# Patient Record
Sex: FEMALE | Race: WHITE | NOT HISPANIC OR LATINO | Employment: OTHER | URBAN - METROPOLITAN AREA
[De-identification: names, ages, dates, MRNs, and addresses within clinical notes are randomized per-mention and may not be internally consistent; named-entity substitution may affect disease eponyms.]

---

## 2017-01-09 NOTE — TELEPHONE ENCOUNTER
----- Message from Maeve Franks sent at 1/9/2017  9:22 AM CST -----  Patient is requesting a medication refill.     RX name: rosuvastatin (CRESTOR) 10 MG tablet  Strength:   Quantity: 30 day supply with refills  Directions:Take 1 tablet (10 mg total) by mouth once daily. - Oral     RX name: citalopram (CELEXA) 10 MG tablet  Strength:   Quantity: 30 day supply with refills  Directions: Take 1 tablet (10 mg total) by mouth every evening. - Oral       Pharmacy name: [facility]

## 2017-01-10 RX ORDER — CITALOPRAM 10 MG/1
10 TABLET ORAL NIGHTLY
Qty: 90 TABLET | Refills: 1 | Status: SHIPPED | OUTPATIENT
Start: 2017-01-10 | End: 2017-08-02 | Stop reason: SDUPTHER

## 2017-01-10 RX ORDER — ROSUVASTATIN CALCIUM 10 MG/1
10 TABLET, COATED ORAL DAILY
Qty: 90 TABLET | Refills: 1 | Status: SHIPPED | OUTPATIENT
Start: 2017-01-10 | End: 2017-08-02 | Stop reason: SDUPTHER

## 2017-01-23 ENCOUNTER — HOSPITAL ENCOUNTER (EMERGENCY)
Facility: HOSPITAL | Age: 47
Discharge: HOME | End: 2017-01-23
Attending: EMERGENCY MEDICINE
Payer: COMMERCIAL

## 2017-01-23 VITALS
HEIGHT: 62 IN | HEART RATE: 91 BPM | OXYGEN SATURATION: 100 % | WEIGHT: 160 LBS | DIASTOLIC BLOOD PRESSURE: 84 MMHG | SYSTOLIC BLOOD PRESSURE: 132 MMHG | TEMPERATURE: 98 F | BODY MASS INDEX: 29.44 KG/M2 | RESPIRATION RATE: 18 BRPM

## 2017-01-23 DIAGNOSIS — K76.0 HEPATIC STEATOSIS: ICD-10-CM

## 2017-01-23 DIAGNOSIS — R10.11 RUQ PAIN: Primary | ICD-10-CM

## 2017-01-23 DIAGNOSIS — R16.0 HEPATOMEGALY: ICD-10-CM

## 2017-01-23 LAB
ALBUMIN SERPL BCP-MCNC: 4 G/DL
ALP SERPL-CCNC: 114 U/L
ALT SERPL W/O P-5'-P-CCNC: 29 U/L
ANION GAP SERPL CALC-SCNC: 10 MMOL/L
AST SERPL-CCNC: 32 U/L
B-HCG UR QL: NEGATIVE
BASOPHILS # BLD AUTO: 0.05 K/UL
BASOPHILS NFR BLD: 0.4 %
BILIRUB SERPL-MCNC: 0.3 MG/DL
BILIRUB UR QL STRIP: NEGATIVE
BUN SERPL-MCNC: 17 MG/DL
CALCIUM SERPL-MCNC: 9.3 MG/DL
CHLORIDE SERPL-SCNC: 101 MMOL/L
CLARITY UR REFRACT.AUTO: CLEAR
CO2 SERPL-SCNC: 26 MMOL/L
COLOR UR AUTO: YELLOW
CREAT SERPL-MCNC: 0.8 MG/DL
CTP QC/QA: YES
DIFFERENTIAL METHOD: ABNORMAL
EOSINOPHIL # BLD AUTO: 0.2 K/UL
EOSINOPHIL NFR BLD: 1.6 %
ERYTHROCYTE [DISTWIDTH] IN BLOOD BY AUTOMATED COUNT: 15.2 %
EST. GFR  (AFRICAN AMERICAN): >60 ML/MIN/1.73 M^2
EST. GFR  (NON AFRICAN AMERICAN): >60 ML/MIN/1.73 M^2
GLUCOSE SERPL-MCNC: 107 MG/DL
GLUCOSE UR QL STRIP: NEGATIVE
HCT VFR BLD AUTO: 35.6 %
HGB BLD-MCNC: 11.5 G/DL
HGB UR QL STRIP: NEGATIVE
KETONES UR QL STRIP: NEGATIVE
LEUKOCYTE ESTERASE UR QL STRIP: NEGATIVE
LIPASE SERPL-CCNC: 23 U/L
LYMPHOCYTES # BLD AUTO: 3.1 K/UL
LYMPHOCYTES NFR BLD: 27.2 %
MCH RBC QN AUTO: 25.8 PG
MCHC RBC AUTO-ENTMCNC: 32.3 %
MCV RBC AUTO: 80 FL
MONOCYTES # BLD AUTO: 0.7 K/UL
MONOCYTES NFR BLD: 5.9 %
NEUTROPHILS # BLD AUTO: 7.4 K/UL
NEUTROPHILS NFR BLD: 64.6 %
NITRITE UR QL STRIP: NEGATIVE
PH UR STRIP: 6 [PH] (ref 5–8)
PLATELET # BLD AUTO: 397 K/UL
PMV BLD AUTO: 10.1 FL
POTASSIUM SERPL-SCNC: 4.5 MMOL/L
PROT SERPL-MCNC: 8.1 G/DL
PROT UR QL STRIP: NEGATIVE
RBC # BLD AUTO: 4.45 M/UL
SODIUM SERPL-SCNC: 137 MMOL/L
SP GR UR STRIP: 1.01 (ref 1–1.03)
URN SPEC COLLECT METH UR: NORMAL
UROBILINOGEN UR STRIP-ACNC: NEGATIVE EU/DL
WBC # BLD AUTO: 11.38 K/UL

## 2017-01-23 PROCEDURE — 81003 URINALYSIS AUTO W/O SCOPE: CPT

## 2017-01-23 PROCEDURE — 25000003 PHARM REV CODE 250: Performed by: PHYSICIAN ASSISTANT

## 2017-01-23 PROCEDURE — 81025 URINE PREGNANCY TEST: CPT | Performed by: PHYSICIAN ASSISTANT

## 2017-01-23 PROCEDURE — 80053 COMPREHEN METABOLIC PANEL: CPT

## 2017-01-23 PROCEDURE — 99285 EMERGENCY DEPT VISIT HI MDM: CPT | Mod: ,,, | Performed by: PHYSICIAN ASSISTANT

## 2017-01-23 PROCEDURE — 63600175 PHARM REV CODE 636 W HCPCS: Performed by: PHYSICIAN ASSISTANT

## 2017-01-23 PROCEDURE — 85025 COMPLETE CBC W/AUTO DIFF WBC: CPT

## 2017-01-23 PROCEDURE — 96361 HYDRATE IV INFUSION ADD-ON: CPT

## 2017-01-23 PROCEDURE — 96374 THER/PROPH/DIAG INJ IV PUSH: CPT

## 2017-01-23 PROCEDURE — 83690 ASSAY OF LIPASE: CPT

## 2017-01-23 PROCEDURE — 99284 EMERGENCY DEPT VISIT MOD MDM: CPT | Mod: 25

## 2017-01-23 RX ORDER — HYOSCYAMINE SULFATE 0.12 MG/1
0.12 TABLET SUBLINGUAL
Status: COMPLETED | OUTPATIENT
Start: 2017-01-23 | End: 2017-01-23

## 2017-01-23 RX ORDER — ONDANSETRON 2 MG/ML
4 INJECTION INTRAMUSCULAR; INTRAVENOUS
Status: COMPLETED | OUTPATIENT
Start: 2017-01-23 | End: 2017-01-23

## 2017-01-23 RX ADMIN — HYOSCYAMINE SULFATE 0.12 MG: 0.12 TABLET ORAL at 10:01

## 2017-01-23 RX ADMIN — SODIUM CHLORIDE 1000 ML: 0.9 INJECTION, SOLUTION INTRAVENOUS at 10:01

## 2017-01-23 RX ADMIN — ONDANSETRON 4 MG: 2 INJECTION INTRAMUSCULAR; INTRAVENOUS at 10:01

## 2017-01-23 RX ADMIN — ALUMINUM HYDROXIDE, MAGNESIUM HYDROXIDE, AND SIMETHICONE 50 ML: 200; 200; 20 SUSPENSION ORAL at 12:01

## 2017-01-23 NOTE — ED AVS SNAPSHOT
[facility]  [address] [facility] [address]               Isela Grant   2017  9:02 AM   ED    Description:  Female : 1970   Department:  [facility]           Your Care was Coordinated By:     Provider Role From To    Darby Mcgee MD Attending Provider 17 0926 --    Alcira Gallagher PA-C Physician Assistant 17 0907 --      Reason for Visit     Abdominal Pain           Diagnoses this Visit        Comments    RUQ pain    -  Primary     Hepatomegaly         Hepatic steatosis           ED Disposition     None           To Do List           Follow-up Information     Follow up with Carlos Santiago MD In 1 week.    Specialty:  Family Medicine    Contact information:    [address] [facility] [address]  [phone]          Follow up with [facility] - Gastroenterology In 3 days.    Specialty:  Gastroenterology    Contact information:    [address] [facility] [id number] [phone]    Additional information:    [facility] - [address]th Floor        [facility]    Specialty:  Emergency Medicine    Why:  If symptoms worsen    Contact information:    [address] [facility] [id number] [phone]      [facility]     [facility] -  Assistance  Registered nurses in the [facility] provide clinical advisement, health education, appointment booking, and other advisory services.  Call for this free service at [phone].             Medications           Message regarding Medications     Verify the changes and/or additions to your medication regime listed below are the same as discussed with your clinician today.  If any of these changes or additions are incorrect, please notify your healthcare provider.        These medications were administered today        Dose Freq    sodium chloride 0.9% bolus 1,000 mL 1,000 mL ED 1 Time     Sig: Inject 1,000 mLs into the vein ED 1 Time.    Class: Normal    Route: Intravenous    Cosign for Ordering: Accepted by Darby Mcgee MD on 1/23/2017  9:27 AM    ondansetron injection 4 mg 4 mg ED 1 Time    Sig: Inject 4 mg into the vein ED 1 Time.    Class: Normal    Route: Intravenous    Cosign for Ordering: Accepted by Darby Mcgee MD on 1/23/2017  9:27 AM    hyoscyamine SL tablet 0.125 mg 0.125 mg ED 1 Time    Sig: Take 1 tablet (0.125 mg total) by mouth ED 1 Time.    Class: Normal    Route: Oral    Cosign for Ordering: Accepted by Darby Mcgee MD on 1/23/2017  9:27 AM    GI cocktail (mylanta 30 mL, lidocaine 2 % viscous 10 mL, dicyclomine 10 mL) 50 mL  ED 1 Time    Sig: Take by mouth ED 1 Time.    Class: Normal    Route: Oral    Cosign for Ordering: Accepted by Darby Mcgee MD on 1/23/2017 12:27 PM           Verify that the below list of medications is an accurate representation of the medications you are currently taking.  If none reported, the list may be blank. If incorrect, please contact your healthcare provider. Carry this list with you in case of emergency.           Current Medications     aspirin (ECOTRIN) 81 MG EC tablet Take 81 mg by mouth once daily.    citalopram (CELEXA) 10 MG tablet Take 1 tablet (10 mg total) by mouth every evening.    esomeprazole (NEXIUM) 20 MG capsule Take 1 capsule (20 mg total) by mouth before breakfast.    fexofenadine (ALLEGRA) 180 MG tablet Take 180 mg by mouth once daily.    fish oil-omega-3 fatty acids 300-1,000 mg capsule Take 2 g by mouth after lunch.     fluticasone (FLONASE) 50 mcg/actuation nasal spray 2 sprays by Each Nare route daily as needed.    gabapentin (NEURONTIN) 300 MG capsule Take 1 capsule (300 mg total) by mouth 2 (two) times daily. For pain    hydrocodone-acetaminophen 5-325mg (NORCO) 5-325 mg per tablet Take 1 tablet by mouth every 6 (six) hours as needed for Pain.    MULTIVIT-IRON-MIN-FOLIC ACID 3,500-18-0.4  "UNIT-MG-MG ORAL CHEW Take 1 tablet by mouth after lunch.     psyllium (METAMUCIL) packet Take 1 packet by mouth once daily.    rosuvastatin (CRESTOR) 10 MG tablet Take 1 tablet (10 mg total) by mouth once daily.    alprazolam (XANAX) 0.5 MG tablet Take 1 tablet (0.5 mg total) by mouth 2 (two) times daily as needed for Anxiety.    FERROUS FUMARATE (IRON ORAL) Take 1 tablet by mouth after lunch.     GI cocktail (mylanta 30 mL, lidocaine 2 % viscous 10 mL, dicyclomine 10 mL) 50 mL Take by mouth ED 1 Time.    ibuprofen (ADVIL,MOTRIN) 100 MG tablet Take 100 mg by mouth every 6 (six) hours as needed for Pain or Temperature greater than (takes 2-3).           Clinical Reference Information           Your Vitals Were     BP Pulse Temp Resp Height Weight    132/84 91 97.9 °F (36.6 °C) (Oral) 18 5' 2" (1.575 m) 72.6 kg (160 lb)    SpO2 BMI             100% 29.26 kg/m2         Allergies as of 1/23/2017        Reactions    Sulfa (Sulfonamide Antibiotics) Other (See Comments)    Stomach pain      Immunizations Administered on Date of Encounter - 1/23/2017     None      ED Micro, Lab, POCT     Start Ordered       Status Ordering Provider    01/23/17 0925 01/23/17 0924  CBC auto differential  STAT      Final result     01/23/17 0925 01/23/17 0924  Comprehensive metabolic panel  STAT      Final result     01/23/17 0925 01/23/17 0924  POCT urine pregnancy  Once      Final result     01/23/17 0924 01/23/17 0924  Lipase  STAT      Final result     01/23/17 0924 01/23/17 0924  Urinalysis  STAT      Final result       ED Imaging Orders     Start Ordered       Status Ordering Provider    01/23/17 0926 01/23/17 0925  US Abdomen Limited (Gallbladder)  1 time imaging      Final result         Discharge Instructions       Closely follow up with gastroenterology. Consider scope and other procedures for further evaluation and management. Take OTC Prolisec daily 1 hour before your first meal for the next 4 weeks. Follow-up with primary care " physician as scheduled or earlier if needed.  Please consider alcohol cessation.  Return to ED for any worsening symptoms.    Future Appointments  Date Time Provider Department Center   2/10/2017 2:30 PM Milagros Loza MD [facility] CARDIO [facility]   3/2/2017 8:00 AM Carlos Santiago MD [facility] FAM MED [facility]       Tips to Control Acid Reflux  To control acid reflux, youll need to make some basic diet and lifestyle changes. The simple steps outlined below may be all youll need to ease discomfort.  Watch what you eat    · Avoid fatty foods and spicy foods.  · Eat fewer acidic foods, such as citrus and tomato-based foods. These can increase symptoms.  · Limit drinking alcohol, caffeine, and fizzy beverages. All increase acid reflux.  · Try limiting chocolate, peppermint, and spearmint. These can worsen acid reflux in some people.  Watch when you eat  · Avoid lying down for 3 hours after eating.  · Do not snack before going to bed.  Raise your head    Raising your head and upper body by 4 to 6 inches helps limit reflux when youre lying down. Put blocks under the head of your bed frame to raise it.  Other changes  · Lose weight, if you need to  · Dont exercise near bedtime  · Avoid tight-fitting clothes  · Limit aspirin and ibuprofen  · Stop smoking   © 5788-9380 [employer]. [address] [place] [address], [place] [address]. All rights reserved. This information is not intended as a substitute for professional medical care. Always follow your healthcare professional's instructions.      Medications for Acid Reflux  Your health care provider has told you that you have acid reflux. This is a condition that causes stomach acid to wash up into your throat. For most people, acid reflux is troubling but not dangerous. However, left untreated, acid reflux sometimes damages the esophagus. Medications can help control acid reflux and limit your risk of future problems.  Medications for Acid Reflux  Your health care  provider may prescribe medication to help treat your acid reflux. Medication will be based on your symptoms and the results of any tests. Your health care provider will explain how to take your medication. You will also be told about possible side effects.  Reducing Stomach Acid  Your doctor may suggest antacids that you can buy over the counter. Antacids can give fast relief. Or you may be told to take a type of medication called H2 blockers. These are available over the counter and by prescription (for higher doses).  Blocking Stomach Acid  In more severe cases, your doctor may suggest stronger medications such as proton pump inhibitors (PPIs). These keep the stomach from making acid. They are often prescribed for long-term use.  Other Medications  If medications to reduce or block stomach acid dont work, you may be switched to another type of medication that helps the stomach empty better.  © 5154-3981 [employer]. [address] [place] [address]. All rights reserved. This information is not intended as a substitute for professional medical care. Always follow your healthcare professional's instructions.      Tests for Liver Disease     A simple blood test can show how your liver is working.   This sheet describes tests that may be done for liver problems. Your healthcare provider will tell you which tests you need.  Blood tests to monitor the liver  A small amount of blood may be taken and tested for one or more of the following:  · AFP (alpha fetoprotein). This is a protein made by the liver. A high level in the blood can be a sign of liver cancer or liver injury and regeneration in adults.  · Albumin. This is a liver function test. It measures a protein made by the liver. When a person has liver disease, the level of albumin in the blood (serum albumin) is often low.  · Alk phos (alkaline phosphatase). This is an enzyme that is mostly made in the liver and bones. Its measured with a  blood test. A high level suggests a problem with the bile ducts in the liver.  · ALT (alanine aminotransferase, formerly called SGPT). ALT is an enzyme made by the liver. When the liver is damaged, ALT leaks into the blood. If a blood test finds a high level of ALT, this can be a sign of liver problems such as inflammation, scarring, or a tumor.  · Ammonia. This is a liver function test that shows when a harmful substance is left behind in the blood after digestion. Normally the liver removes ammonia from the blood and turns it into urea. This leaves the body with urine. If a blood test shows that the ammonia level is too high, this process isnt happening as it should. This test is very inaccurate for liver function and should rarely be used.  · AST (aspartate aminotransferase, formerly called SGOT). AST is another enzyme made by the liver. It too is measured with a blood test. High levels of AST may be a sign of liver injury, especially if the ALT level is also high.  · Bilirubin. This is a liver function test. It measures the yellow substance made when the body breaks down red blood cells. Bilirubin is collected by the liver to be sent out of the body with stool. When something is wrong with the liver or bile ducts, bilirubin may build up in the body. This causes yellowing of the skin and the whites of the eyes (jaundice). Two measurements may be taken from this test: total bilirubin and direct bilirubin. A high bilirubin level may be the result of liver disease or a blockage in the bile ducts. A high indirect bilirubin can mean a condition called Gilbert syndrome. Only a small portion of people have Gilbert syndrome. Gilbert syndrome is not a sign of disease. A high indirect bilirubin can also be a sign of rapid red blood cell breakdown. This is why a bilirubin test is not a good way to test liver function.  · CBC (complete blood count). This is a test that measures all the parts of the blood. These are red  blood cells, white blood cells, and platelets. Problems with these counts can mean infection or illness. They can also be a sign of a problem with the spleen. The spleen is an organ close to the liver that can be affected by liver disease. A low platelet count is common with advanced fibrosis of the liver. It also happens when the spleen becomes enlarged and begins to absorb platelets.  · GGT (gamma-glutamyl transpeptidase). This is a liver enzyme thats often measured along with other enzymes to gauge liver problems. GGT is measured with a blood test. If alk phos and GGT are both higher than normal, it may be a sign that the bile ducts in the liver may be diseased or blocked. It also can be a sign of fatty liver or alcohol damage.  · Glucose. This is a sugar in the blood and the body's most important source of energy. A healthy liver helps the body maintain a normal glucose level. If a blood test shows that glucose is low, this may mean the liver is not working properly.  · Infectious hepatitis. This is a disease can be found with antibody and antigen tests for Hepatitis A, B, C, and E.  · INR (international normalized ratio). Prothrombin time (PT) tests the ability of the blood to form clots. The liver makes a protein that helps with clotting. Problems with clotting can be a sign of liver disease and show low levels of vitamin K.  · 5'-Nucleotidase (5NT). This is an enzyme that is made is several organs, but only released into the blood by the liver. A high or low level may be a sign of liver disease.  · Serum bile acid (SBA). This test measures the amount of bile acid in the blood. A high level may mean that bile ducts are blocked or that the liver is unable to excrete bile acid. This test is rarely done.  · Vitamins A, D, E, and K. These vitamins are stored in the liver and fat and released over time (fat-soluble). They are absorbed by the liver, with help from bile. If a blood test shows that levels of these  vitamins are low, this could mean the liver is not absorbing them properly.  · Zinc. This is a nutrient that is absorbed by the liver. If a blood test shows a low zinc level, this could mean the liver isnt absorbing zinc properly. This can worsen conditions brought on by high levels of ammonia.  Several other lab tests may be done to check for specific liver problems once liver damage is found. These include autoimmune antibodies, ceruloplasmin (Geovanni disease), an iron panel (hemochromatosis), and alpha-1-antitrypsin (alpha-1-antitrypsin deficiency).  Procedures to monitor the liver  Below are procedures that may be done to monitor the condition or function of the liver or related organs, such as the gallbladder or bile ducts.  · Liver biopsy. This is a test to look for damage in liver tissue. A needle is used to take a small amount of tissue from the liver. The tissue is sent to a lab, where it is checked for signs of inflammation, scarring, or other problems.  · CT scan. A CT scan is a series of X-rays that make a 3-D picture of the liver and gallbladder. This can show gallstones, abscesses, abnormal blood vessels, or tumors.  · ERCP (endoscopic retrograde cholangiopnacreatography). This is a test that can show if the bile ducts are blocked or narrowed. It can also take pictures of the gallbladder. During this test, a small flexible tube (endoscope) is put into the mouth. The tube is moved down the esophagus and stomach to the top of the small intestine. This is where the bile ducts are. Dye is released through the scope to make the bile ducts show up on an X-ray. The doctor may also use small tools to take tiny samples of tissue or fluid. These are sent to a lab to be studied.  · HIDA scan. This test checks gallbladder and liver function. A small amount of radioactive fluid is put into the body. This fluid will be seen on a scan as it travels through the liver to the gallbladder and into the intestine. It can  show if bile ducts are missing or blocked. It can show if the gallbladder is working properly. It can also show other problems in the bile ducts.  · MRI. This test uses magnets, radio waves, and a computer to create an image of the organs and tissues in your body. MRCP (magnetic resonance cholangiopnacreatography) is a more detailed test. It can show abnormal or narrow bile ducts, tumors, gallstones, or all three.  · Sonogram (ultrasound). This test uses sound waves and a computer to create a picture of the liver, gallbladder, and bile ducts. It can show gallstones, tumors, or fat in the liver. It is also used to check the condition of the blood vessels and look for bile collections where bile may leak out of the liver.   © 5089-6381 [employer]. [address] [place] [address]. All rights reserved. This information is not intended as a substitute for professional medical care. Always follow your healthcare professional's instructions.    Your Scheduled Appointments     Feb 10, 2017  2:30 PM CST   Established Patient Visit with Milagros Loaz MD   [facility] - Cardiology ([facility] )    [address] [place] [id number]   [phone]            Mar 02, 2017  8:00 AM CST   Established Patient Visit with Carlos Santiago MD   [place] - Family Medicine ([facility])    [address] [place] [id number] [phone]               [facility] complies with applicable Federal civil rights laws and does not discriminate on the basis of race, color, national origin, age, disability, or sex.        Language Assistance Services     ATTENTION: Language assistance services are available, free of charge. Please call [phone].      ATENCIÓN: Si habla español, tiene a yancey disposición servicios gratuitos de asistencia lingüística. Llame al [phone].     CHÚ Ý: N?u b?n nói Ti?ng Vi?t, có các d?ch v? h? tr? ngôn ng? mi?n phí dành cho b?n. G?i s?  [phone].

## 2017-01-23 NOTE — ED NOTES
Pt identifiers Isela Grant was checked and is correct  LOC: The patient is awake, alert, aware of environment with an appropriate affect. Oriented x3, speaking appropriately  APPEARANCE: Pt complaints of abd pain in right upper quadrant that has been persistent for several weeks, worse today with nausea.  Denies vomiting.  Pt is in no acute distress, pt is clean and well groomed, clothing properly fastened  SKIN: Skin warm, dry and intact, normal skin turgor, moist mucus membranes  RESPIRATORY: Airway is open and patent, respirations are spontaneous, even and unlabored, normal effort and rate  CARDIAC: Normal rate and rhythm, no peripheral edema noted,   ABDOMEN: Soft, tenderness in right upper quadrant on palpation, nondistended.   NEUROLOGIC: , patient moving all extremities spontaneously, Follows all commands appropriately  MUSCULOSKELETAL: No obvious deformities.

## 2017-01-23 NOTE — DISCHARGE INSTRUCTIONS
Closely follow up with gastroenterology. Consider scope and other procedures for further evaluation and management. Take OTC Prolisec daily 1 hour before your first meal for the next 4 weeks. Follow-up with primary care physician as scheduled or earlier if needed.  Please consider alcohol cessation.  Return to ED for any worsening symptoms.    Future Appointments  Date Time Provider Department Center   2/10/2017 2:30 PM Milagros Loza MD [facility] CARDIO [place] [facility]   3/2/2017 8:00 AM Carlos Santiago MD [facility] FAM MED [facility]       Tips to Control Acid Reflux  To control acid reflux, youll need to make some basic diet and lifestyle changes. The simple steps outlined below may be all youll need to ease discomfort.  Watch what you eat    · Avoid fatty foods and spicy foods.  · Eat fewer acidic foods, such as citrus and tomato-based foods. These can increase symptoms.  · Limit drinking alcohol, caffeine, and fizzy beverages. All increase acid reflux.  · Try limiting chocolate, peppermint, and spearmint. These can worsen acid reflux in some people.  Watch when you eat  · Avoid lying down for 3 hours after eating.  · Do not snack before going to bed.  Raise your head    Raising your head and upper body by 4 to 6 inches helps limit reflux when youre lying down. Put blocks under the head of your bed frame to raise it.  Other changes  · Lose weight, if you need to  · Dont exercise near bedtime  · Avoid tight-fitting clothes  · Limit aspirin and ibuprofen  · Stop smoking   © 2891-2966 [employer] [address] [place] [address], [place] [address]. All rights reserved. This information is not intended as a substitute for professional medical care. Always follow your healthcare professional's instructions.      Medications for Acid Reflux  Your health care provider has told you that you have acid reflux. This is a condition that causes stomach acid to wash up into your throat. For most people, acid reflux is troubling  but not dangerous. However, left untreated, acid reflux sometimes damages the esophagus. Medications can help control acid reflux and limit your risk of future problems.  Medications for Acid Reflux  Your health care provider may prescribe medication to help treat your acid reflux. Medication will be based on your symptoms and the results of any tests. Your health care provider will explain how to take your medication. You will also be told about possible side effects.  Reducing Stomach Acid  Your doctor may suggest antacids that you can buy over the counter. Antacids can give fast relief. Or you may be told to take a type of medication called H2 blockers. These are available over the counter and by prescription (for higher doses).  Blocking Stomach Acid  In more severe cases, your doctor may suggest stronger medications such as proton pump inhibitors (PPIs). These keep the stomach from making acid. They are often prescribed for long-term use.  Other Medications  If medications to reduce or block stomach acid dont work, you may be switched to another type of medication that helps the stomach empty better.  © 2589-1577 [employer]. [address] [place] [address]. All rights reserved. This information is not intended as a substitute for professional medical care. Always follow your healthcare professional's instructions.      Tests for Liver Disease     A simple blood test can show how your liver is working.   This sheet describes tests that may be done for liver problems. Your healthcare provider will tell you which tests you need.  Blood tests to monitor the liver  A small amount of blood may be taken and tested for one or more of the following:  · AFP (alpha fetoprotein). This is a protein made by the liver. A high level in the blood can be a sign of liver cancer or liver injury and regeneration in adults.  · Albumin. This is a liver function test. It measures a protein made by the  liver. When a person has liver disease, the level of albumin in the blood (serum albumin) is often low.  · Alk phos (alkaline phosphatase). This is an enzyme that is mostly made in the liver and bones. Its measured with a blood test. A high level suggests a problem with the bile ducts in the liver.  · ALT (alanine aminotransferase, formerly called SGPT). ALT is an enzyme made by the liver. When the liver is damaged, ALT leaks into the blood. If a blood test finds a high level of ALT, this can be a sign of liver problems such as inflammation, scarring, or a tumor.  · Ammonia. This is a liver function test that shows when a harmful substance is left behind in the blood after digestion. Normally the liver removes ammonia from the blood and turns it into urea. This leaves the body with urine. If a blood test shows that the ammonia level is too high, this process isnt happening as it should. This test is very inaccurate for liver function and should rarely be used.  · AST (aspartate aminotransferase, formerly called SGOT). AST is another enzyme made by the liver. It too is measured with a blood test. High levels of AST may be a sign of liver injury, especially if the ALT level is also high.  · Bilirubin. This is a liver function test. It measures the yellow substance made when the body breaks down red blood cells. Bilirubin is collected by the liver to be sent out of the body with stool. When something is wrong with the liver or bile ducts, bilirubin may build up in the body. This causes yellowing of the skin and the whites of the eyes (jaundice). Two measurements may be taken from this test: total bilirubin and direct bilirubin. A high bilirubin level may be the result of liver disease or a blockage in the bile ducts. A high indirect bilirubin can mean a condition called Gilbert syndrome. Only a small portion of people have Gilbert syndrome. Gilbert syndrome is not a sign of disease. A high indirect bilirubin can  also be a sign of rapid red blood cell breakdown. This is why a bilirubin test is not a good way to test liver function.  · CBC (complete blood count). This is a test that measures all the parts of the blood. These are red blood cells, white blood cells, and platelets. Problems with these counts can mean infection or illness. They can also be a sign of a problem with the spleen. The spleen is an organ close to the liver that can be affected by liver disease. A low platelet count is common with advanced fibrosis of the liver. It also happens when the spleen becomes enlarged and begins to absorb platelets.  · GGT (gamma-glutamyl transpeptidase). This is a liver enzyme thats often measured along with other enzymes to gauge liver problems. GGT is measured with a blood test. If alk phos and GGT are both higher than normal, it may be a sign that the bile ducts in the liver may be diseased or blocked. It also can be a sign of fatty liver or alcohol damage.  · Glucose. This is a sugar in the blood and the body's most important source of energy. A healthy liver helps the body maintain a normal glucose level. If a blood test shows that glucose is low, this may mean the liver is not working properly.  · Infectious hepatitis. This is a disease can be found with antibody and antigen tests for Hepatitis A, B, C, and E.  · INR (international normalized ratio). Prothrombin time (PT) tests the ability of the blood to form clots. The liver makes a protein that helps with clotting. Problems with clotting can be a sign of liver disease and show low levels of vitamin K.  · 5'-Nucleotidase (5NT). This is an enzyme that is made is several organs, but only released into the blood by the liver. A high or low level may be a sign of liver disease.  · Serum bile acid (SBA). This test measures the amount of bile acid in the blood. A high level may mean that bile ducts are blocked or that the liver is unable to excrete bile acid. This test is  rarely done.  · Vitamins A, D, E, and K. These vitamins are stored in the liver and fat and released over time (fat-soluble). They are absorbed by the liver, with help from bile. If a blood test shows that levels of these vitamins are low, this could mean the liver is not absorbing them properly.  · Zinc. This is a nutrient that is absorbed by the liver. If a blood test shows a low zinc level, this could mean the liver isnt absorbing zinc properly. This can worsen conditions brought on by high levels of ammonia.  Several other lab tests may be done to check for specific liver problems once liver damage is found. These include autoimmune antibodies, ceruloplasmin (Geovanni disease), an iron panel (hemochromatosis), and alpha-1-antitrypsin (alpha-1-antitrypsin deficiency).  Procedures to monitor the liver  Below are procedures that may be done to monitor the condition or function of the liver or related organs, such as the gallbladder or bile ducts.  · Liver biopsy. This is a test to look for damage in liver tissue. A needle is used to take a small amount of tissue from the liver. The tissue is sent to a lab, where it is checked for signs of inflammation, scarring, or other problems.  · CT scan. A CT scan is a series of X-rays that make a 3-D picture of the liver and gallbladder. This can show gallstones, abscesses, abnormal blood vessels, or tumors.  · ERCP (endoscopic retrograde cholangiopnacreatography). This is a test that can show if the bile ducts are blocked or narrowed. It can also take pictures of the gallbladder. During this test, a small flexible tube (endoscope) is put into the mouth. The tube is moved down the esophagus and stomach to the top of the small intestine. This is where the bile ducts are. Dye is released through the scope to make the bile ducts show up on an X-ray. The doctor may also use small tools to take tiny samples of tissue or fluid. These are sent to a lab to be studied.  · HIDA  scan. This test checks gallbladder and liver function. A small amount of radioactive fluid is put into the body. This fluid will be seen on a scan as it travels through the liver to the gallbladder and into the intestine. It can show if bile ducts are missing or blocked. It can show if the gallbladder is working properly. It can also show other problems in the bile ducts.  · MRI. This test uses magnets, radio waves, and a computer to create an image of the organs and tissues in your body. MRCP (magnetic resonance cholangiopnacreatography) is a more detailed test. It can show abnormal or narrow bile ducts, tumors, gallstones, or all three.  · Sonogram (ultrasound). This test uses sound waves and a computer to create a picture of the liver, gallbladder, and bile ducts. It can show gallstones, tumors, or fat in the liver. It is also used to check the condition of the blood vessels and look for bile collections where bile may leak out of the liver.   © 8991-3148 [employer] "[employer] [address] [place] [address], [place] [address]. All rights reserved. This information is not intended as a substitute for professional medical care. Always follow your healthcare professional's instructions.

## 2017-01-23 NOTE — ED TRIAGE NOTES
"Pt states pain in upper right upper quadrant x several weeks.  Today pain 8/10 with nausea.  Denies vomiting.  Pt states she has had several test done for "gallbladder" and all have been negative so far.  Here for eval of abd pain.   "

## 2017-02-02 ENCOUNTER — OFFICE VISIT (OUTPATIENT)
Dept: GASTROENTEROLOGY | Facility: CLINIC | Age: 47
End: 2017-02-02
Payer: COMMERCIAL

## 2017-02-02 ENCOUNTER — TELEPHONE (OUTPATIENT)
Dept: GASTROENTEROLOGY | Facility: CLINIC | Age: 47
End: 2017-02-02

## 2017-02-02 VITALS
WEIGHT: 180 LBS | DIASTOLIC BLOOD PRESSURE: 80 MMHG | HEIGHT: 62 IN | HEART RATE: 78 BPM | SYSTOLIC BLOOD PRESSURE: 130 MMHG | BODY MASS INDEX: 33.13 KG/M2

## 2017-02-02 DIAGNOSIS — R10.11 RUQ PAIN: Primary | ICD-10-CM

## 2017-02-02 DIAGNOSIS — N28.9 KIDNEY LESION: ICD-10-CM

## 2017-02-02 DIAGNOSIS — C43.4 MELANOMA OF SCALP: ICD-10-CM

## 2017-02-02 DIAGNOSIS — R12 HEARTBURN SYMPTOM: ICD-10-CM

## 2017-02-02 PROCEDURE — 99999 PR PBB SHADOW E&M-EST. PATIENT-LVL III: CPT | Mod: PBBFAC,,, | Performed by: INTERNAL MEDICINE

## 2017-02-02 PROCEDURE — 99204 OFFICE O/P NEW MOD 45 MIN: CPT | Mod: S$GLB,,, | Performed by: INTERNAL MEDICINE

## 2017-02-02 NOTE — MR AVS SNAPSHOT
[facility]  [facility] [place] [address]  Phone: [phone]  Fax: [phone]                  Isela Grant   2017 1:40 PM   Office Visit    Description:  Female : 1970   Provider:  Chuck Rubio Jr., MD   Department:  [facility]           Reason for Visit     Gastroesophageal Reflux     Alcohol Problem     Bloated           Diagnoses this Visit        Comments    RUQ pain    -  Primary     Heartburn symptom         Melanoma of scalp         Kidney lesion                To Do List           Future Appointments        Provider Department Dept Phone    2/10/2017 2:30 PM Milagros Loza MD [facility] - Cardiology [phone]    3/2/2017 8:00 AM Carlos Santiago MD [facility] Family Medicine [phone]      Goals (5 Years of Data)     None      [facility]     [facility] -  Assistance  Registered nurses in the [facility] provide clinical advisement, health education, appointment booking, and other advisory services.  Call for this free service at [phone].             Medications           Message regarding Medications     Verify the changes and/or additions to your medication regime listed below are the same as discussed with your clinician today.  If any of these changes or additions are incorrect, please notify your healthcare provider.             Verify that the below list of medications is an accurate representation of the medications you are currently taking.  If none reported, the list may be blank. If incorrect, please contact your healthcare provider. Carry this list with you in case of emergency.           Current Medications     aspirin (ECOTRIN) 81 MG EC tablet Take 81 mg by mouth once daily.    citalopram (CELEXA) 10 MG tablet Take 1 tablet (10 mg total) by mouth every evening.    esomeprazole (NEXIUM) 20 MG capsule Take 1 capsule (20 mg total) by mouth before breakfast.     "FERROUS FUMARATE (IRON ORAL) Take 1 tablet by mouth after lunch.     fexofenadine (ALLEGRA) 180 MG tablet Take 180 mg by mouth once daily.    fish oil-omega-3 fatty acids 300-1,000 mg capsule Take 2 g by mouth after lunch.     fluticasone (FLONASE) 50 mcg/actuation nasal spray 2 sprays by Each Nare route daily as needed.    gabapentin (NEURONTIN) 300 MG capsule Take 1 capsule (300 mg total) by mouth 2 (two) times daily. For pain    hydrocodone-acetaminophen 5-325mg (NORCO) 5-325 mg per tablet Take 1 tablet by mouth every 6 (six) hours as needed for Pain.    ibuprofen (ADVIL,MOTRIN) 100 MG tablet Take 100 mg by mouth every 6 (six) hours as needed for Pain or Temperature greater than (takes 2-3).    MULTIVIT-IRON-MIN-FOLIC ACID 3,500-18-0.4 UNIT-MG-MG ORAL CHEW Take 1 tablet by mouth after lunch.     psyllium (METAMUCIL) packet Take 1 packet by mouth once daily.    rosuvastatin (CRESTOR) 10 MG tablet Take 1 tablet (10 mg total) by mouth once daily.    alprazolam (XANAX) 0.5 MG tablet Take 1 tablet (0.5 mg total) by mouth 2 (two) times daily as needed for Anxiety.           Clinical Reference Information           Your Vitals Were     BP Pulse Height Weight BMI    130/80 (BP Location: Right arm, Patient Position: Sitting) 78 5' 2" (1.575 m) 81.6 kg (180 lb) 32.92 kg/m2      Blood Pressure          Most Recent Value    BP  130/80      Allergies as of 2/2/2017     Sulfa (Sulfonamide Antibiotics)      Immunizations Administered on Date of Encounter - 2/2/2017     None      Orders Placed During Today's Visit     Future Labs/Procedures Expected by Expires    CT Abdomen Pelvis W Wo Contrast  2/2/2017 2/2/2018      Instructions      Computed Tomography (CT)  Computed tomography (CT) is a test that combines X-rays and computer scans. The result is a detailed picture that can show problems with soft tissues, such as the lining of your sinuses, organs, such as your kidneys or lungs, blood vessels, and bones.     During the " "test, relax and remain as still as you can.   Before your test  · Be sure to tell your health care provider if you have ever had a reaction to contrast material ("X-ray dye"). If you have had a reaction, you may need to take medicine 12 hours before your scan, so be sure to tell your doctor ahead of time.   · Be sure to mention the medicines you take. Ask if it's OK to take them before the test.   · Follow any directions youre given for not eating or drinking before the procedure. Your provider will give you instructions if required. You may be required to drink contrast by mouth before arriving for the study depending on the type of exam you are having. Your provider or the imaging site will provide this for you.  · The length of the procedure may vary, depending on your condition and your provider's practices.  · Arrive on time to check in.  · When you arrive, you may be asked to change into a hospital gown. Remove all metal near the part of your body that will be scanned, including jewelry, eyeglasses, and dentures. Women may need to remove any bra that has metal underwire.   Tell the technologist   Be sure to tell the technologist if:  · You have allergies or kidney problems  · You take diabetes medicine  · You are pregnant or think you may be  · You ate or drank anything before the test   During your test  · You may be given contrast through an intravenous (IV) line or by mouth.  · You will lie on a table. The table slides into the CT scanner.  · The technologist will ask you to hold your breath for a few seconds during your scan.  After your test  · You can go back to your normal diet and activities right away. Any contrast will pass naturally through your body within a day.  · Before leaving, you may need to wait briefly while your images are being reviewed. Your doctor will discuss the test results with you during a follow-up appointment or over the phone.  · Your next appointment is:__________________  © " 3927-3634 [employer]. [address] [place] [address], [place] [address]. All rights reserved. This information is not intended as a substitute for professional medical care. Always follow your healthcare professional's instructions.             Language Assistance Services     ATTENTION: Language assistance services are available, free of charge. Please call [phone].      ATENCIÓN: Si habla español, tiene a yancey disposición servicios gratuitos de asistencia lingüística. Llame al [phone].     CHÚ Ý: N?u b?n nói Ti?ng Vi?t, có các d?ch v? h? tr? ngôn ng? mi?n phí dành cho b?n. G?i s? [phone].         [facility] complies with applicable Federal civil rights laws and does not discriminate on the basis of race, color, national origin, age, disability, or sex.

## 2017-02-02 NOTE — TELEPHONE ENCOUNTER
Received patients records from [facility] on 02/02/2017. Records was scan in under media.

## 2017-02-02 NOTE — PATIENT INSTRUCTIONS
"  Computed Tomography (CT)  Computed tomography (CT) is a test that combines X-rays and computer scans. The result is a detailed picture that can show problems with soft tissues, such as the lining of your sinuses, organs, such as your kidneys or lungs, blood vessels, and bones.     During the test, relax and remain as still as you can.   Before your test  · Be sure to tell your health care provider if you have ever had a reaction to contrast material ("X-ray dye"). If you have had a reaction, you may need to take medicine 12 hours before your scan, so be sure to tell your doctor ahead of time.   · Be sure to mention the medicines you take. Ask if it's OK to take them before the test.   · Follow any directions youre given for not eating or drinking before the procedure. Your provider will give you instructions if required. You may be required to drink contrast by mouth before arriving for the study depending on the type of exam you are having. Your provider or the imaging site will provide this for you.  · The length of the procedure may vary, depending on your condition and your provider's practices.  · Arrive on time to check in.  · When you arrive, you may be asked to change into a hospital gown. Remove all metal near the part of your body that will be scanned, including jewelry, eyeglasses, and dentures. Women may need to remove any bra that has metal underwire.   Tell the technologist   Be sure to tell the technologist if:  · You have allergies or kidney problems  · You take diabetes medicine  · You are pregnant or think you may be  · You ate or drank anything before the test   During your test  · You may be given contrast through an intravenous (IV) line or by mouth.  · You will lie on a table. The table slides into the CT scanner.  · The technologist will ask you to hold your breath for a few seconds during your scan.  After your test  · You can go back to your normal diet and activities right away. Any " contrast will pass naturally through your body within a day.  · Before leaving, you may need to wait briefly while your images are being reviewed. Your doctor will discuss the test results with you during a follow-up appointment or over the phone.  · Your next appointment is:__________________  © 7911-5828 [employer]. [address] [place] [address]. All rights reserved. This information is not intended as a substitute for professional medical care. Always follow your healthcare professional's instructions.

## 2017-02-02 NOTE — PROGRESS NOTES
Subjective:      Patient ID: sIela Grant is a 46 y.o. female.    Chief Complaint: Gastroesophageal Reflux; Alcohol Problem; and Bloated    HPI:   Patient 46-year-old female presenting for GI follow-up.  She gives a history of chronic sporadic right upper quadrant pain for several years.  Prior evaluations by her history included upper endoscopy, colonoscopy, HIDA scan, ultrasound.  Ultrasound demonstrated fatty liver and hepatomegaly.  Prior ultrasound demonstrated a 7 mm renal lesion.  Other studies are not available at present.  We'll request the studies from [facility].  Patient is been seen by cardiology and cleared with noninvasive studies.  Patient indicates the pain is more persistent now and has periodic exacerbations severe enough to prompt an emergency room visit January 23.  GI systems review includes some heartburn and dyspepsia for which she takes a PPI.  Some abdominal bloating.  Past medical history includes melanoma of her scalp in 2015.  Prior appendectomy.  She has been treated with iron for mild anemia.  Ferritin level in the past was normal.  Denies NSAID use.  Family history negative for GI neoplasm.  Review of patient's allergies indicates:   Allergen Reactions    Sulfa (sulfonamide antibiotics) Other (See Comments)     Stomach pain     Past Medical History   Diagnosis Date    Allergy     Anxiety     Depression     Hyperlipidemia     Melanoma 1/26/2015     scalp excised by Dr Aldridge    Melanoma of scalp 1/12/2015    Open wound of scalp, complicated 2/6/2015     Past Surgical History   Procedure Laterality Date    Skin surgery       removal of cancer    Appendectomy      Tonsillectomy      Eye surgery      Wle scalp melanoma  1/26/2015    Scalp reconstruction  2/9/2015     yin-hull flaps     Family History   Problem Relation Age of Onset    Hyperlipidemia Mother     Heart attack Mother     Hyperlipidemia Father     Cancer Father     Heart disease Father     Cancer  "Maternal Grandmother      BCC    Breast cancer Paternal Grandmother 50    Heart attack Paternal Grandmother     Heart disease Paternal Grandmother     Melanoma Neg Hx     Colon cancer Neg Hx     Ovarian cancer Neg Hx      Social History     Social History    Marital status:      Spouse name: N/A    Number of children: N/A    Years of education: N/A     Occupational History    Not on file.     Social History Main Topics    Smoking status: Never Smoker    Smokeless tobacco: Not on file    Alcohol use Yes      Comment: occasionally    Drug use: No    Sexual activity: Yes     Partners: Male     Birth control/ protection: None     Other Topics Concern    Are You Pregnant Or Think You May Be? No     Social History Narrative    Works at a day care       Review of Systems:  Constitutional: Negative for appetite change.   HENT: Negative for trouble swallowing.   Eyes: Negative for photophobia.   Respiratory: Negative for cough and shortness of breath.   Cardiovascular: Negative for palpitations.   Gastrointestinal: See HPI for details.  Genitourinary: Negative for frequency and hematuria.   Skin: Negative for rash.   Neurological: Negative for weakness and headaches.   Hematological: Negative.   Psychiatric/Behavioral: Negative for suicidal ideas and behavioral problems.  Some anxiety    Objective:     Visit Vitals    /80 (BP Location: Right arm, Patient Position: Sitting)    Pulse 78    Ht 5' 2" (1.575 m)    Wt 81.6 kg (180 lb)    BMI 32.92 kg/m2       Physical Exam:  Eyes: Pupils are equal, round, and reactive to light.   Neck: Supple. No mass  Cardiovascular: Regular rhythm . No murmur   Pulmonary/Chest: Lungs clear   Abdominal: Soft. No mass palpated. Nontender, no guarding. Positive bowel sounds   Musculoskeletal: No deformity. No edema.   Psychiatric: Alert and oriented    Assessment:     1. RUQ pain    2. Heartburn symptom    3. [id number] melanoma of scalp    4. Kidney lesion  "     Plan:     Isela was seen today for gastroesophageal reflux, alcohol problem and bloated.    Diagnoses and all orders for this visit:    RUQ pain  -     CT Abdomen Pelvis W Wo Contrast; Future    Heartburn symptom  -     CT Abdomen Pelvis W Wo Contrast; Future    [id number] melanoma of scalp  -     CT Abdomen Pelvis W Wo Contrast; Future    Kidney lesion  -     CT Abdomen Pelvis W Wo Contrast; Future      Plan:  CT abdomen and pelvis with and without IV contrast  Request records from [facility] including endoscopies, HIDA scan, ultrasound, any  abdominal imaging

## 2017-02-08 ENCOUNTER — HOSPITAL ENCOUNTER (OUTPATIENT)
Dept: RADIOLOGY | Facility: HOSPITAL | Age: 47
Discharge: HOME | End: 2017-02-08
Attending: INTERNAL MEDICINE
Payer: COMMERCIAL

## 2017-02-08 DIAGNOSIS — C43.4 MELANOMA OF SCALP: ICD-10-CM

## 2017-02-08 DIAGNOSIS — R10.11 RUQ PAIN: ICD-10-CM

## 2017-02-08 DIAGNOSIS — R12 HEARTBURN SYMPTOM: ICD-10-CM

## 2017-02-08 DIAGNOSIS — N28.9 KIDNEY LESION: ICD-10-CM

## 2017-02-08 PROCEDURE — 25500020 PHARM REV CODE 255: Mod: PO | Performed by: INTERNAL MEDICINE

## 2017-02-08 PROCEDURE — 74178 CT ABD&PLV WO CNTR FLWD CNTR: CPT | Mod: TC,PO

## 2017-02-08 RX ADMIN — IOHEXOL 30 ML: 300 INJECTION, SOLUTION INTRAVENOUS at 08:02

## 2017-02-08 RX ADMIN — IOHEXOL 90 ML: 350 INJECTION, SOLUTION INTRAVENOUS at 09:02

## 2017-02-10 ENCOUNTER — OFFICE VISIT (OUTPATIENT)
Dept: CARDIOLOGY | Facility: CLINIC | Age: 47
End: 2017-02-10
Payer: COMMERCIAL

## 2017-02-10 VITALS
BODY MASS INDEX: 33.07 KG/M2 | SYSTOLIC BLOOD PRESSURE: 116 MMHG | DIASTOLIC BLOOD PRESSURE: 67 MMHG | HEIGHT: 62 IN | WEIGHT: 179.69 LBS | HEART RATE: 85 BPM

## 2017-02-10 DIAGNOSIS — R10.11 RUQ PAIN: ICD-10-CM

## 2017-02-10 DIAGNOSIS — Z82.79 FAMILY HISTORY OF CONGENITAL HEART DISEASE: Primary | ICD-10-CM

## 2017-02-10 DIAGNOSIS — F41.9 ANXIETY: ICD-10-CM

## 2017-02-10 DIAGNOSIS — R07.9 CHEST PAIN, UNSPECIFIED TYPE: ICD-10-CM

## 2017-02-10 PROCEDURE — 99999 PR PBB SHADOW E&M-EST. PATIENT-LVL III: CPT | Mod: PBBFAC,,, | Performed by: INTERNAL MEDICINE

## 2017-02-10 PROCEDURE — 99215 OFFICE O/P EST HI 40 MIN: CPT | Mod: S$GLB,,, | Performed by: INTERNAL MEDICINE

## 2017-02-10 NOTE — MR AVS SNAPSHOT
[facility] - [facility]  [address]  [facility] [address]  Phone: [phone]                  Isela Grant   2/10/2017 2:30 PM   Office Visit    Description:  Female : 1970   Provider:  Milagros Loza MD   Department:  [facility]           Reason for Visit     Family history of congenital heart disease                To Do List           Future Appointments        Provider Department Dept Phone    3/2/2017 8:00 AM Carlos Santiago MD [facility] [phone]      Goals (5 Years of Data)     None      [facility] On [facility]     [facility] On Call Nurse Care Line -  Assistance  Registered nurses in the [facility] On Call Center provide clinical advisement, health education, appointment booking, and other advisory services.  Call for this free service at [phone].             Medications           Message regarding Medications     Verify the changes and/or additions to your medication regime listed below are the same as discussed with your clinician today.  If any of these changes or additions are incorrect, please notify your healthcare provider.        STOP taking these medications     psyllium (METAMUCIL) packet Take 1 packet by mouth once daily.           Verify that the below list of medications is an accurate representation of the medications you are currently taking.  If none reported, the list may be blank. If incorrect, please contact your healthcare provider. Carry this list with you in case of emergency.           Current Medications     alprazolam (XANAX) 0.5 MG tablet Take 1 tablet (0.5 mg total) by mouth 2 (two) times daily as needed for Anxiety.    aspirin (ECOTRIN) 81 MG EC tablet Take 81 mg by mouth once daily.    citalopram (CELEXA) 10 MG tablet Take 1 tablet (10 mg total) by mouth every evening.    esomeprazole (NEXIUM) 20 MG capsule Take 1 capsule (20 mg total) by mouth before breakfast.    FERROUS FUMARATE (IRON ORAL) Take 1 tablet by mouth after  "lunch.     fexofenadine (ALLEGRA) 180 MG tablet Take 180 mg by mouth as needed.     fish oil-omega-3 fatty acids 300-1,000 mg capsule Take 2 g by mouth after lunch.     fluticasone (FLONASE) 50 mcg/actuation nasal spray 2 sprays by Each Nare route daily as needed.    gabapentin (NEURONTIN) 300 MG capsule Take 1 capsule (300 mg total) by mouth 2 (two) times daily. For pain    hydrocodone-acetaminophen 5-325mg (NORCO) 5-325 mg per tablet Take 1 tablet by mouth every 6 (six) hours as needed for Pain.    ibuprofen (ADVIL,MOTRIN) 100 MG tablet Take 100 mg by mouth every 6 (six) hours as needed for Pain or Temperature greater than (takes 2-3).    MULTIVIT-IRON-MIN-FOLIC ACID 3,500-18-0.4 UNIT-MG-MG ORAL CHEW Take 1 tablet by mouth after lunch.     rosuvastatin (CRESTOR) 10 MG tablet Take 1 tablet (10 mg total) by mouth once daily.           Clinical Reference Information           Your Vitals Were     BP Pulse Height Weight BMI    116/67 (BP Location: Left arm, Patient Position: Sitting, BP Method: Automatic) 85 5' 1.5" (1.562 m) 81.5 kg (179 lb 10.8 oz) 33.4 kg/m2      Blood Pressure          Most Recent Value    Right Arm BP - Sitting  116/66    Left Arm BP - Sitting  116/67    BP  116/67      Allergies as of 2/10/2017     Sulfa (Sulfonamide Antibiotics)      Immunizations Administered on Date of Encounter - 2/10/2017     None      Language Assistance Services     ATTENTION: Language assistance services are available, free of charge. Please call [phone].      ATENCIÓN: Si velvet littlejohn, tiene a yancey disposición servicios gratuitos de asistencia lingüística. Llame al [phone].     DAMION Ý: N?u b?n nói Ti?ng Vi?t, có các d?ch v? h? tr? ngôn ng? mi?n phí dành cho b?n. G?i s? [phone].         [facility] complies with applicable Federal civil rights laws and does not discriminate on the basis of race, color, national origin, age, disability, or sex.        "

## 2017-02-13 NOTE — PROGRESS NOTES
"Subjective:   Patient ID:  Isela Grant is a 46 y.o. female who presents for follow-up of Family history of congenital heart disease (3 month f/u )    HPI:   Echo 2017.   1 - Normal left ventricular systolic function (EF 60-65%).     2 - Normal left ventricular diastolic function.     3 - Normal right ventricular systolic function .     4 - The estimated PA systolic pressure is 31 mmHg.     5 - Trivial mitral regurgitation.     6 - Mild tricuspid regurgitation.   Bubble study negative for ischemia.   I reviewed the study as well. There is no congenital abnormality.    HPI:  Patient continues to c/o RUQ pain that is somewhat constant. Her chest pain has improved and she bikes and is very active.     Patient Active Problem List   Diagnosis    [id number] melanoma of scalp    RUQ pain     Visit Vitals    /67 (BP Location: Left arm, Patient Position: Sitting, BP Method: Automatic)    Pulse 85    Ht 5' 1.5" (1.562 m)    Wt 81.5 kg (179 lb 10.8 oz)    BMI 33.4 kg/m2     Body mass index is 33.4 kg/(m^2).  CrCl cannot be calculated (Patient has no serum creatinine result on file.).    Lab Results   Component Value Date     01/23/2017    K 4.5 01/23/2017     01/23/2017    CO2 26 01/23/2017    BUN 17 01/23/2017    CREATININE 0.8 01/23/2017     01/23/2017    HGBA1C 5.5 06/26/2012    AST 32 01/23/2017    ALT 29 01/23/2017    ALBUMIN 4.0 01/23/2017    PROT 8.1 01/23/2017    BILITOT 0.3 01/23/2017    WBC 11.38 01/23/2017    HGB 11.5 (L) 01/23/2017    HCT 35.6 (L) 01/23/2017    MCV 80 (L) 01/23/2017     (H) 01/23/2017    TSH 1.290 01/18/2016    CHOL 174 01/18/2016    HDL 66 01/18/2016    LDLCALC 75.2 01/18/2016    TRIG 164 (H) 01/18/2016       Current Outpatient Prescriptions   Medication Sig    alprazolam (XANAX) 0.5 MG tablet Take 1 tablet (0.5 mg total) by mouth 2 (two) times daily as needed for Anxiety.    aspirin (ECOTRIN) 81 MG EC tablet Take 81 mg by mouth once daily.    citalopram " (CELEXA) 10 MG tablet Take 1 tablet (10 mg total) by mouth every evening.    esomeprazole (NEXIUM) 20 MG capsule Take 1 capsule (20 mg total) by mouth before breakfast.    FERROUS FUMARATE (IRON ORAL) Take 1 tablet by mouth after lunch.     fexofenadine (ALLEGRA) 180 MG tablet Take 180 mg by mouth as needed.     fish oil-omega-3 fatty acids 300-1,000 mg capsule Take 2 g by mouth after lunch.     fluticasone (FLONASE) 50 mcg/actuation nasal spray 2 sprays by Each Nare route daily as needed.    gabapentin (NEURONTIN) 300 MG capsule Take 1 capsule (300 mg total) by mouth 2 (two) times daily. For pain    hydrocodone-acetaminophen 5-325mg (NORCO) 5-325 mg per tablet Take 1 tablet by mouth every 6 (six) hours as needed for Pain.    ibuprofen (ADVIL,MOTRIN) 100 MG tablet Take 100 mg by mouth every 6 (six) hours as needed for Pain or Temperature greater than (takes 2-3).    MULTIVIT-IRON-MIN-FOLIC ACID 3,500-18-0.4 UNIT-MG-MG ORAL CHEW Take 1 tablet by mouth after lunch.     rosuvastatin (CRESTOR) 10 MG tablet Take 1 tablet (10 mg total) by mouth once daily.     No current facility-administered medications for this visit.        Review of Systems   Constitution: Negative. Negative for chills, decreased appetite, weakness, malaise/fatigue, night sweats, weight gain and weight loss.   HENT: Negative.    Eyes: Negative.  Negative for blurred vision, double vision, visual disturbance and visual halos.   Cardiovascular: Positive for chest pain. Negative for claudication, cyanosis, dyspnea on exertion, irregular heartbeat, leg swelling, near-syncope, orthopnea, palpitations, paroxysmal nocturnal dyspnea and syncope.   Respiratory: Negative.  Negative for cough, hemoptysis, snoring, sputum production and wheezing.    Endocrine: Negative.  Negative for cold intolerance, heat intolerance, polydipsia and polyphagia.   Hematologic/Lymphatic: Negative for adenopathy and bleeding problem. Does not bruise/bleed easily.   Skin:  Negative.  Negative for flushing, itching, poor wound healing and rash.   Musculoskeletal: Negative.  Negative for arthritis, back pain, falls, gout, joint pain, joint swelling, muscle cramps, muscle weakness, myalgias, neck pain and stiffness.   Gastrointestinal: Negative for bloating, abdominal pain, anorexia, diarrhea, dysphagia, excessive appetite, flatus, hematemesis, jaundice, melena and nausea.   Genitourinary: Negative for hesitancy and incomplete emptying.   Neurological: Negative for aphonia, brief paralysis, difficulty with concentration, disturbances in coordination, excessive daytime sleepiness, dizziness, focal weakness, light-headedness and loss of balance.   Psychiatric/Behavioral: Negative for altered mental status, depression, hallucinations, hypervigilance, memory loss, substance abuse and suicidal ideas. The patient does not have insomnia and is not nervous/anxious.         Anxiety         Objective:   Physical Exam   Constitutional: She is oriented to person, place, and time. She appears well-developed and well-nourished. No distress.   Patient appears anxious   HENT:   Head: Normocephalic and atraumatic.   Nose: Nose normal.   Mouth/Throat: Oropharynx is clear and moist. No oropharyngeal exudate.   Eyes: Conjunctivae and EOM are normal. Pupils are equal, round, and reactive to light. Right eye exhibits no discharge. Left eye exhibits no discharge. No scleral icterus.   Neck: Normal range of motion. Neck supple. No JVD present. No tracheal deviation present. No thyromegaly present.   Cardiovascular: Normal rate, regular rhythm, normal heart sounds and intact distal pulses.  Exam reveals no gallop and no friction rub.    No murmur heard.  Pulmonary/Chest: Effort normal and breath sounds normal. No stridor. No respiratory distress. She has no wheezes. She has no rales. She exhibits no tenderness.   Abdominal: Soft. Bowel sounds are normal. She exhibits no distension and no mass. There is no  tenderness. There is no rebound and no guarding.   Musculoskeletal: Normal range of motion. She exhibits no edema or tenderness.   Lymphadenopathy:     She has no cervical adenopathy.   Neurological: She is alert and oriented to person, place, and time. She has normal reflexes. No cranial nerve deficit. She exhibits normal muscle tone. Coordination normal.   Skin: Skin is warm. No rash noted. She is not diaphoretic. No erythema. No pallor.   Psychiatric: She has a normal mood and affect. Her behavior is normal. Judgment and thought content normal.       Assessment:     1. Family history of congenital heart disease    2. Chest pain, unspecified type    3. Anxiety    4. RUQ pain        Plan:   Isela was seen today for family history of congenital heart disease.    Diagnoses and all orders for this visit:    Family history of congenital heart disease    Chest pain, unspecified type    Anxiety    RUQ pain      Normal LV function and structure. Do  Not suspect CAD based on symptoms.   RUQ pain to be evaluated by PCP/  Counseled on importance of heart healthy diet low in saturated and trans fat and salt as well gradually starting a regular aerobic exercise regimen with goal of 30min 5x/week. Recommend BP diary. Call if systolic BP > 140 mmHg on checking repeatedly      RTC PRN.

## 2017-02-14 ENCOUNTER — TELEPHONE (OUTPATIENT)
Dept: GASTROENTEROLOGY | Facility: CLINIC | Age: 47
End: 2017-02-14

## 2017-02-14 NOTE — TELEPHONE ENCOUNTER
----- Message from Nickie Parada sent at 2/14/2017  3:02 PM CST -----  Contact: self/[phone]  She is calling for the results of the ctscan.

## 2017-02-16 ENCOUNTER — TELEPHONE (OUTPATIENT)
Dept: GASTROENTEROLOGY | Facility: CLINIC | Age: 47
End: 2017-02-16

## 2017-02-16 NOTE — TELEPHONE ENCOUNTER
----- Message from Chuck Rubio Jr., MD sent at 2/16/2017  2:27 PM CST -----  Contact: self/[phone]  Patient has continued abdominal pain and some heartburn despite Nexium  Please schedule EGD.    She is requesting [facility] the Thursday after Kevin Susanne      I discussed her CT findings with her.      ----- Message -----     From: Sharon Barrios MA     Sent: 2/14/2017   3:39 PM       To: Chuck Rubio Jr., MD    Have you seen the CT results for this pt ?  ----- Message -----     From: Nickie Parada     Sent: 2/14/2017   3:02 PM       To: [facility] Staff ([place])    She is calling for the results of the ctscan.

## 2017-02-22 DIAGNOSIS — C43.4 MALIGNANT MELANOMA OF SCALP: Primary | ICD-10-CM

## 2017-02-22 DIAGNOSIS — R59.9 ENLARGED LYMPH NODE: ICD-10-CM

## 2017-02-23 ENCOUNTER — TELEPHONE (OUTPATIENT)
Dept: GASTROENTEROLOGY | Facility: CLINIC | Age: 47
End: 2017-02-23

## 2017-02-23 NOTE — TELEPHONE ENCOUNTER
Pt was scheduled for PET scan on Friday 3/3/17 at [facility] for 12 noon. Pt is to fast 6 hrs prior to study. Pt may have non flavored water. Pt was notified.

## 2017-02-23 NOTE — TELEPHONE ENCOUNTER
----- Message from Chuck Rubio Jr., MD sent at 2/22/2017  4:52 PM CST -----  I have ordered a PET scan on this patient  The reason is a prior history of melanoma of the scalp   and an incidental prominent lymph node in the abdomen by CT.    I notified patient to expect a call.  I believe PET scans are scheduled at [facility]

## 2017-03-03 ENCOUNTER — HOSPITAL ENCOUNTER (OUTPATIENT)
Dept: RADIOLOGY | Facility: HOSPITAL | Age: 47
Discharge: HOME | End: 2017-03-03
Attending: INTERNAL MEDICINE
Payer: COMMERCIAL

## 2017-03-03 DIAGNOSIS — C43.4 MALIGNANT MELANOMA OF SCALP: ICD-10-CM

## 2017-03-03 DIAGNOSIS — R59.9 ENLARGED LYMPH NODE: ICD-10-CM

## 2017-03-03 PROCEDURE — 78816 PET IMAGE W/CT FULL BODY: CPT | Mod: TC

## 2017-03-03 PROCEDURE — 78816 PET IMAGE W/CT FULL BODY: CPT | Mod: 26,PI,, | Performed by: RADIOLOGY

## 2017-03-09 ENCOUNTER — TELEPHONE (OUTPATIENT)
Dept: GASTROENTEROLOGY | Facility: CLINIC | Age: 47
End: 2017-03-09

## 2017-03-20 ENCOUNTER — OFFICE VISIT (OUTPATIENT)
Dept: FAMILY MEDICINE | Facility: CLINIC | Age: 47
End: 2017-03-20
Payer: COMMERCIAL

## 2017-03-20 VITALS
HEART RATE: 85 BPM | WEIGHT: 182.13 LBS | OXYGEN SATURATION: 97 % | DIASTOLIC BLOOD PRESSURE: 84 MMHG | TEMPERATURE: 98 F | SYSTOLIC BLOOD PRESSURE: 126 MMHG | BODY MASS INDEX: 33.51 KG/M2 | HEIGHT: 62 IN

## 2017-03-20 DIAGNOSIS — R05.9 COUGH: ICD-10-CM

## 2017-03-20 DIAGNOSIS — J30.89 NON-SEASONAL ALLERGIC RHINITIS DUE TO OTHER ALLERGIC TRIGGER: Primary | ICD-10-CM

## 2017-03-20 PROCEDURE — 1160F RVW MEDS BY RX/DR IN RCRD: CPT | Mod: S$GLB,,, | Performed by: NURSE PRACTITIONER

## 2017-03-20 PROCEDURE — 96372 THER/PROPH/DIAG INJ SC/IM: CPT | Mod: S$GLB,,, | Performed by: NURSE PRACTITIONER

## 2017-03-20 PROCEDURE — 99213 OFFICE O/P EST LOW 20 MIN: CPT | Mod: 25,S$GLB,, | Performed by: NURSE PRACTITIONER

## 2017-03-20 RX ORDER — CODEINE PHOSPHATE AND GUAIFENESIN 10; 100 MG/5ML; MG/5ML
5 SOLUTION ORAL NIGHTLY PRN
Qty: 180 ML | Refills: 0 | Status: SHIPPED | OUTPATIENT
Start: 2017-03-20 | End: 2017-03-30

## 2017-03-20 RX ORDER — TRIAMCINOLONE ACETONIDE 40 MG/ML
80 INJECTION, SUSPENSION INTRA-ARTICULAR; INTRAMUSCULAR
Status: COMPLETED | OUTPATIENT
Start: 2017-03-20 | End: 2017-03-20

## 2017-03-20 RX ADMIN — TRIAMCINOLONE ACETONIDE 80 MG: 40 INJECTION, SUSPENSION INTRA-ARTICULAR; INTRAMUSCULAR at 12:03

## 2017-03-20 NOTE — MR AVS SNAPSHOT
[facility]  [address] [address]  Phone: [phone]  Fax: [phone]                  Isela Grant   3/20/2017 11:40 AM   Office Visit    Description:  Female : 1970   Provider:  Violeta Pulido NP   Department:  [facility]           Reason for Visit     Sore Throat     Cough           Diagnoses this Visit        Comments    Non-seasonal allergic rhinitis due to other allergic trigger    -  Primary     Cough                To Do List           Goals (5 Years of Data)     None       These Medications        Disp Refills Start End    guaifenesin-codeine 100-10 mg/5 ml (CHERATUSSIN AC)  mg/5 mL syrup 180 mL 0 3/20/2017 3/30/2017    Take 5 mLs by mouth nightly as needed for Cough. - Oral    Pharmacy: [id number] [address] #: [phone]         [facility] On Call     [facility] On Call Nurse Care Line -  Assistance  Registered nurses in the [facility] On Call Center provide clinical advisement, health education, appointment booking, and other advisory services.  Call for this free service at [phone].             Medications           Message regarding Medications     Verify the changes and/or additions to your medication regime listed below are the same as discussed with your clinician today.  If any of these changes or additions are incorrect, please notify your healthcare provider.        START taking these NEW medications        Refills    guaifenesin-codeine 100-10 mg/5 ml (CHERATUSSIN AC)  mg/5 mL syrup 0    Sig: Take 5 mLs by mouth nightly as needed for Cough.    Class: Normal    Route: Oral      These medications were administered today        Dose Freq    triamcinolone acetonide injection 80 mg 80 mg Clinic/HOD 1 time    Sig: Inject 2 mLs (80 mg total) into the muscle one time.    Class: Normal    Route: Intramuscular      STOP taking these medications     FERROUS FUMARATE (IRON  "ORAL) Take 1 tablet by mouth after lunch.            Verify that the below list of medications is an accurate representation of the medications you are currently taking.  If none reported, the list may be blank. If incorrect, please contact your healthcare provider. Carry this list with you in case of emergency.           Current Medications     alprazolam (XANAX) 0.5 MG tablet Take 1 tablet (0.5 mg total) by mouth 2 (two) times daily as needed for Anxiety.    aspirin (ECOTRIN) 81 MG EC tablet Take 81 mg by mouth once daily.    citalopram (CELEXA) 10 MG tablet Take 1 tablet (10 mg total) by mouth every evening.    esomeprazole (NEXIUM) 20 MG capsule Take 1 capsule (20 mg total) by mouth before breakfast.    fexofenadine (ALLEGRA) 180 MG tablet Take 180 mg by mouth as needed.     fish oil-omega-3 fatty acids 300-1,000 mg capsule Take 2 g by mouth after lunch.     fluticasone (FLONASE) 50 mcg/actuation nasal spray 2 sprays by Each Nare route daily as needed.    gabapentin (NEURONTIN) 300 MG capsule Take 1 capsule (300 mg total) by mouth 2 (two) times daily. For pain    hydrocodone-acetaminophen 5-325mg (NORCO) 5-325 mg per tablet Take 1 tablet by mouth every 6 (six) hours as needed for Pain.    ibuprofen (ADVIL,MOTRIN) 100 MG tablet Take 100 mg by mouth every 6 (six) hours as needed for Pain or Temperature greater than (takes 2-3).    MULTIVIT-IRON-MIN-FOLIC ACID 3,500-18-0.4 UNIT-MG-MG ORAL CHEW Take 1 tablet by mouth after lunch.     rosuvastatin (CRESTOR) 10 MG tablet Take 1 tablet (10 mg total) by mouth once daily.    guaifenesin-codeine 100-10 mg/5 ml (CHERATUSSIN AC)  mg/5 mL syrup Take 5 mLs by mouth nightly as needed for Cough.           Clinical Reference Information           Your Vitals Were     BP Pulse Temp Height Weight SpO2    126/84 85 97.7 °F (36.5 °C) (Oral) 5' 1.5" (1.562 m) 82.6 kg (182 lb 1.6 oz) 97%    BMI                33.85 kg/m2          Blood Pressure          Most Recent Value    BP  " 126/84      Allergies as of 3/20/2017     Sulfa (Sulfonamide Antibiotics)      Immunizations Administered on Date of Encounter - 3/20/2017     None      Instructions    mucinex (not d or dm) over the counter, 600mg by mouth twice a day with lots of water           Language Assistance Services     ATTENTION: Language assistance services are available, free of charge. Please call [phone].      ATENCIÓN: Si habla eron, tiene a yancey disposición servicios gratuitos de asistencia lingüística. Llame al [phone].     CHÚ Ý: N?u b?n nói Ti?ng Vi?t, có các d?ch v? h? tr? ngôn ng? mi?n phí dành cho b?n. G?i s? [phone].         [facility] complies with applicable Federal civil rights laws and does not discriminate on the basis of race, color, national origin, age, disability, or sex.

## 2017-03-20 NOTE — PROGRESS NOTES
Subjective:       Patient ID: Isela Grant is a 46 y.o. female.    Chief Complaint: Sore Throat and Cough    Sore Throat    This is a new problem. The current episode started in the past 7 days. The problem has been unchanged. Neither side of throat is experiencing more pain than the other. There has been no fever. The pain is at a severity of 6/10. The pain is moderate. Associated symptoms include congestion and coughing. Pertinent negatives include no abdominal pain, diarrhea, drooling, ear discharge, ear pain, headaches, hoarse voice, plugged ear sensation, neck pain, shortness of breath, stridor, swollen glands, trouble swallowing or vomiting. She has had no exposure to strep or mono. Treatments tried: OTC medicine. The treatment provided no relief.   Cough   This is a new problem. The current episode started in the past 7 days. The problem has been unchanged. The problem occurs every few minutes. The cough is non-productive. Associated symptoms include nasal congestion, postnasal drip and a sore throat. Pertinent negatives include no chest pain, chills, ear congestion, ear pain, fever, headaches, heartburn, hemoptysis, myalgias, rhinorrhea, shortness of breath, sweats, weight loss or wheezing. The symptoms are aggravated by lying down. Treatments tried: OTC medicine. The treatment provided no relief. There is no history of asthma, bronchiectasis, bronchitis, COPD, emphysema, environmental allergies or pneumonia.     Review of Systems   Constitutional: Negative for chills, diaphoresis, fatigue, fever and weight loss.   HENT: Positive for congestion, postnasal drip and sore throat. Negative for drooling, ear discharge, ear pain, hoarse voice, rhinorrhea and trouble swallowing.    Respiratory: Positive for cough. Negative for hemoptysis, shortness of breath, wheezing and stridor.    Cardiovascular: Negative for chest pain.   Gastrointestinal: Negative for abdominal pain, diarrhea, heartburn and vomiting.    Musculoskeletal: Negative for myalgias and neck pain.   Allergic/Immunologic: Negative for environmental allergies.   Neurological: Negative for headaches.       Objective:      Physical Exam   Constitutional: She is oriented to person, place, and time. She appears well-developed and well-nourished. No distress.   HENT:   Right Ear: Tympanic membrane and external ear normal.   Left Ear: Tympanic membrane and external ear normal.   Nose: No mucosal edema or rhinorrhea. Right sinus exhibits no maxillary sinus tenderness and no frontal sinus tenderness. Left sinus exhibits no maxillary sinus tenderness and no frontal sinus tenderness.   Mouth/Throat: No oropharyngeal exudate, posterior oropharyngeal edema or posterior oropharyngeal erythema.   Cardiovascular: Normal rate, regular rhythm and normal heart sounds.    Pulmonary/Chest: Effort normal and breath sounds normal.   Neurological: She is alert and oriented to person, place, and time.   Skin: Skin is warm and dry. She is not diaphoretic.   Psychiatric: She has a normal mood and affect. Her behavior is normal. Judgment and thought content normal.   Vitals reviewed.      Assessment:       1. Non-seasonal allergic rhinitis due to other allergic trigger    2. Cough        Plan:       Non-seasonal allergic rhinitis due to other allergic trigger    Cough  -     guaifenesin-codeine 100-10 mg/5 ml (CHERATUSSIN AC)  mg/5 mL syrup; Take 5 mLs by mouth nightly as needed for Cough.  Dispense: 180 mL; Refill: 0    Other orders  -     triamcinolone acetonide injection 80 mg; Inject 2 mLs (80 mg total) into the muscle one time.      Continue Flonase  Continue Allegra  mucinex during the day  Hydrate well

## 2017-04-10 ENCOUNTER — TELEPHONE (OUTPATIENT)
Dept: FAMILY MEDICINE | Facility: CLINIC | Age: 47
End: 2017-04-10

## 2017-04-10 RX ORDER — ALPRAZOLAM 0.5 MG/1
0.5 TABLET ORAL 2 TIMES DAILY PRN
Qty: 30 TABLET | Refills: 0 | Status: SHIPPED | OUTPATIENT
Start: 2017-04-10 | End: 2018-07-31 | Stop reason: SDUPTHER

## 2017-04-10 NOTE — TELEPHONE ENCOUNTER
----- Message from Maeve Franks sent at 4/10/2017  2:37 PM CDT -----  Patient's grandmother passed away this weekend. Patient is requesting a new Rx for Alprazalam 0.5mg for anxiety. Says its been a very long time since you last prescribed.     [facility]

## 2017-04-16 RX ORDER — HYDROGEN PEROXIDE 3 %
SOLUTION, NON-ORAL MISCELLANEOUS
Qty: 90 CAPSULE | Refills: 3 | Status: SHIPPED | OUTPATIENT
Start: 2017-04-16 | End: 2017-08-02 | Stop reason: SDUPTHER

## 2017-06-19 RX ORDER — GABAPENTIN 300 MG/1
300 CAPSULE ORAL 2 TIMES DAILY
Qty: 180 CAPSULE | Refills: 3 | Status: SHIPPED | OUTPATIENT
Start: 2017-06-19 | End: 2017-07-24 | Stop reason: SDUPTHER

## 2017-06-19 NOTE — TELEPHONE ENCOUNTER
Please send to [facility]     gabapentin (NEURONTIN) 300 MG capsule  Take 1 capsule (300 mg total) by mouth 2 (two) times daily. For pain   Normal, Disp-180 capsule, R-3

## 2017-07-10 ENCOUNTER — TELEPHONE (OUTPATIENT)
Dept: DERMATOLOGY | Facility: CLINIC | Age: 47
End: 2017-07-10

## 2017-07-10 NOTE — TELEPHONE ENCOUNTER
----- Message from Gabriela Sol MD sent at 7/7/2017  5:20 PM CDT -----  Please remind pt that she is due for a skin check next month.    Thanks,  Gabriela    ----- Message -----  From: William Ortez MD  Sent: 2/21/2017   8:40 AM  To: MD Gabriela Bauer, this would likely need to be an open surgical biopsy rather than a laparoscopic approach given L3 para-aortic location.    I would consider getting a PET CT CT scan to see if that LN has abnormal physiologic activity prior to biopsy.    Hope this helps, William    Would be glad to offer her an open surgical biopsy if that LN is abnormal on PET / CT.  Should be at threshold for resolution if positive for an disease.  ----- Message -----     From: Gabriela Sol MD     Sent: 2/17/2017  12:51 PM       To: William Ortez MD    Hi Dr. Ortez,    This patient had a melanoma excised from her scalp in 2015 with a max depth of 0.67mm but regression was seen down to 1.5mm. She recently had a CT scan that showed a 7mm para-aortic lymph node. Could you look at the scan to see if this is something you'd be able to biopsy?     Thanks,  Gabriela Sol    ----- Message -----     From: Chuck Rubio Jr., MD     Sent: 2/16/2017   2:32 PM       To: Gabriela Sol MD, MD Dr. Sweta Noel Jr.,    Please note the CT abdomen pelvis.  The radiologist seems concerned about a 7 mm para-aortic lymph node.  He referenced  her history of melanoma.  From your perspective, how might this be addressed?    Thanks  ALLISON Rubio, gastroenterology

## 2017-07-12 ENCOUNTER — TELEPHONE (OUTPATIENT)
Dept: DERMATOLOGY | Facility: CLINIC | Age: 47
End: 2017-07-12

## 2017-07-24 ENCOUNTER — PATIENT MESSAGE (OUTPATIENT)
Dept: FAMILY MEDICINE | Facility: CLINIC | Age: 47
End: 2017-07-24

## 2017-07-25 RX ORDER — GABAPENTIN 300 MG/1
300 CAPSULE ORAL NIGHTLY
Qty: 90 CAPSULE | Refills: 3 | Status: SHIPPED | OUTPATIENT
Start: 2017-07-25 | End: 2018-09-26

## 2017-08-02 RX ORDER — ROSUVASTATIN CALCIUM 10 MG/1
10 TABLET, COATED ORAL DAILY
Qty: 90 TABLET | Refills: 3 | Status: SHIPPED | OUTPATIENT
Start: 2017-08-02 | End: 2018-06-27 | Stop reason: SDUPTHER

## 2017-08-02 RX ORDER — CITALOPRAM 10 MG/1
10 TABLET ORAL NIGHTLY
Qty: 90 TABLET | Refills: 3 | Status: SHIPPED | OUTPATIENT
Start: 2017-08-02 | End: 2018-06-27 | Stop reason: SDUPTHER

## 2017-08-02 RX ORDER — HYDROGEN PEROXIDE 3 %
20 SOLUTION, NON-ORAL MISCELLANEOUS
Qty: 90 CAPSULE | Refills: 3 | Status: SHIPPED | OUTPATIENT
Start: 2017-08-02 | End: 2018-06-27 | Stop reason: SDUPTHER

## 2017-10-20 ENCOUNTER — OFFICE VISIT (OUTPATIENT)
Dept: DERMATOLOGY | Facility: CLINIC | Age: 47
End: 2017-10-20
Payer: COMMERCIAL

## 2017-10-20 DIAGNOSIS — L73.8 SEBACEOUS HYPERPLASIA: ICD-10-CM

## 2017-10-20 DIAGNOSIS — Z12.83 SKIN CANCER SCREENING: ICD-10-CM

## 2017-10-20 DIAGNOSIS — D22.9 MULTIPLE BENIGN NEVI: Primary | ICD-10-CM

## 2017-10-20 DIAGNOSIS — Z85.820 HISTORY OF MELANOMA: ICD-10-CM

## 2017-10-20 PROCEDURE — 99999 PR PBB SHADOW E&M-EST. PATIENT-LVL II: CPT | Mod: PBBFAC,,, | Performed by: DERMATOLOGY

## 2017-10-20 PROCEDURE — 99214 OFFICE O/P EST MOD 30 MIN: CPT | Mod: S$GLB,,, | Performed by: DERMATOLOGY

## 2017-10-20 NOTE — PROGRESS NOTES
Subjective:       Patient ID:  Isela Graf is a 47 y.o. female who presents for   Chief Complaint   Patient presents with    Skin Check     TBSE     HPI  Pt is here for total body skin check today.  Has a history of melanoma excised from her frontal scalp in Jan 2015 and BCC excised from her R cheek yrs ago.     Pt initially had a biopsy of the scalp lesion by Dr. Wolf which revealed invasive melanoma, 0.42mm thick (with regression to 1.51 mm), but no mitoses or ulceration.   The excision specimen showed residual invasive melanoma extending to a max depth of 0.67 mm with negative margins.  SLN bx was attempted, but there was unsuccessful localization of the sentinel node.      Has not noticed any new, symptomatic, or changing growths on skin.  Had a SC bump on scalp biopsied in the past and it showed calcinosis cutis.    Review of Systems   Constitutional: Negative for fever, chills and fatigue.   Skin: Negative for daily sunscreen use.   Hematologic/Lymphatic: Does not bruise/bleed easily.        Objective:    Physical Exam   Constitutional: She appears well-developed and well-nourished. No distress.   Lymphadenopathy: No supraclavicular adenopathy is present.     She has no cervical adenopathy.     She has no axillary adenopathy.     She has no inguinal adenopathy.   Neurological: She is alert and oriented to person, place, and time. She is not disoriented.   Psychiatric: She has a normal mood and affect.   Skin:   Areas Examined (abnormalities noted in diagram):   Scalp / Hair Palpated and Inspected  Head / Face Inspection Performed  Neck Inspection Performed  Chest / Axilla Inspection Performed  Abdomen Inspection Performed  Genitals / Buttocks / Groin Inspection Performed  Back Inspection Performed  RUE Inspected  LUE Inspection Performed  RLE Inspected  LLE Inspection Performed  Nails and Digits Inspection Performed                   Diagram Legend     Erythematous scaling macule/papule c/w actinic  keratosis       Vascular papule c/w angioma      Pigmented verrucoid papule/plaque c/w seborrheic keratosis      Yellow umbilicated papule c/w sebaceous hyperplasia      Irregularly shaped tan macule c/w lentigo     1-2 mm smooth white papules consistent with Milia      Movable subcutaneous cyst with punctum c/w epidermal inclusion cyst      Subcutaneous movable cyst c/w pilar cyst      Firm pink to brown papule c/w dermatofibroma      Pedunculated fleshy papule(s) c/w skin tag(s)      Evenly pigmented macule c/w junctional nevus     Mildly variegated pigmented, slightly irregular-bordered macule c/w mildly atypical nevus      Flesh colored to evenly pigmented papule c/w intradermal nevus       Pink pearly papule/plaque c/w basal cell carcinoma      Erythematous hyperkeratotic cursted plaque c/w SCC      Surgical scar with no sign of skin cancer recurrence      Open and closed comedones      Inflammatory papules and pustules      Verrucoid papule consistent consistent with wart     Erythematous eczematous patches and plaques     Dystrophic onycholytic nail with subungual debris c/w onychomycosis     Umbilicated papule    Erythematous-base heme-crusted tan verrucoid plaque consistent with inflamed seborrheic keratosis     Erythematous Silvery Scaling Plaque c/w Psoriasis     See annotation      Assessment / Plan:        Multiple benign nevi  Benign-appearing on exam today. Counseled pt to monitor mole(s) and return to clinic if any changes noted or symptoms (bleeding, itching, pain, etc) noted. Brochure provided.    Sebaceous hyperplasia  This is a benign overgrowth of oil glands. Reassurance given. No treatment required.  Treatment of benign, asymptomatic lesions may be considered cosmetic.    History of melanoma and Skin cancer screening  Area of previous melanoma examined. Site well healed with no signs of recurrence.  No lymphadenopathy palpated on exam today.  Total body skin examination performed today  including at least 12 points as noted in physical examination. Recommended continuing routine skin exams as well as routine checks with ophthalmology, dentist, and OB/GYN (if female) for any concerning lesions.  Patient instructed in importance of daily broad spectrum sunscreen use with spf at least 30. Sun avoidance and topical protection/protective clothing discussed.      Return in about 6 months (around 4/20/2018).

## 2017-10-20 NOTE — PATIENT INSTRUCTIONS

## 2017-11-29 ENCOUNTER — OFFICE VISIT (OUTPATIENT)
Dept: OPTOMETRY | Facility: CLINIC | Age: 47
End: 2017-11-29
Payer: COMMERCIAL

## 2017-11-29 DIAGNOSIS — H52.223 MYOPIA OF BOTH EYES WITH REGULAR ASTIGMATISM AND PRESBYOPIA: ICD-10-CM

## 2017-11-29 DIAGNOSIS — Z01.00 EYE EXAM, ROUTINE: Primary | ICD-10-CM

## 2017-11-29 DIAGNOSIS — H52.13 MYOPIA OF BOTH EYES WITH REGULAR ASTIGMATISM AND PRESBYOPIA: ICD-10-CM

## 2017-11-29 DIAGNOSIS — H52.4 MYOPIA OF BOTH EYES WITH REGULAR ASTIGMATISM AND PRESBYOPIA: ICD-10-CM

## 2017-11-29 DIAGNOSIS — H53.031 AMBLYOPIA, STRABISMIC, RIGHT: ICD-10-CM

## 2017-11-29 PROCEDURE — 92015 DETERMINE REFRACTIVE STATE: CPT | Mod: S$GLB,,, | Performed by: OPTOMETRIST

## 2017-11-29 PROCEDURE — 99999 PR PBB SHADOW E&M-EST. PATIENT-LVL II: CPT | Mod: PBBFAC,,, | Performed by: OPTOMETRIST

## 2017-11-29 PROCEDURE — 92004 COMPRE OPH EXAM NEW PT 1/>: CPT | Mod: S$GLB,,, | Performed by: OPTOMETRIST

## 2017-11-29 NOTE — PROGRESS NOTES
HPI      is here for annual eye exam. Pt has history of [place]   surgery OD as child.  Pt request Refraction  Grandmother HX of glaucoma  (-)Flashes (-)Floaters  (-)Itch, (-)tear, (-)burn, (-)Dryness. (-) OTC Drops   (-)Photophobia  (-)Glare (-)diplopia (-) headaches        Last edited by AJ Contreras on 11/29/2017 10:48 AM. (History)            Assessment /Plan     For exam results, see Encounter Report.    Eye exam, routine  -annual DFE    Amblyopia, strabismic, right  -Long standing  -Mild Exo posture  -Denies Diplopia  -BVA 20/30    Myopia of both eyes with regular astigmatism and presbyopia  Eyeglass Final Rx     Eyeglass Final Rx       Sphere Cylinder Axis Dist VA Add    Right -2.00 +3.50 090 20/30 +1.75    Left -5.25 +4.50 085 20/25 +1.75    Type:  PAL    Expiration Date:  11/30/2018                  RTC 1 yr

## 2017-12-01 ENCOUNTER — OFFICE VISIT (OUTPATIENT)
Dept: OBSTETRICS AND GYNECOLOGY | Facility: CLINIC | Age: 47
End: 2017-12-01
Attending: OBSTETRICS & GYNECOLOGY
Payer: COMMERCIAL

## 2017-12-01 VITALS
HEIGHT: 62 IN | DIASTOLIC BLOOD PRESSURE: 80 MMHG | BODY MASS INDEX: 32.25 KG/M2 | WEIGHT: 175.25 LBS | SYSTOLIC BLOOD PRESSURE: 102 MMHG

## 2017-12-01 DIAGNOSIS — Z12.4 PAP SMEAR FOR CERVICAL CANCER SCREENING: ICD-10-CM

## 2017-12-01 DIAGNOSIS — N95.1 PERIMENOPAUSAL: ICD-10-CM

## 2017-12-01 DIAGNOSIS — Z01.419 WELL WOMAN EXAM WITH ROUTINE GYNECOLOGICAL EXAM: Primary | ICD-10-CM

## 2017-12-01 DIAGNOSIS — Z12.31 VISIT FOR SCREENING MAMMOGRAM: ICD-10-CM

## 2017-12-01 PROCEDURE — 99999 PR PBB SHADOW E&M-EST. PATIENT-LVL III: CPT | Mod: PBBFAC,,, | Performed by: OBSTETRICS & GYNECOLOGY

## 2017-12-01 PROCEDURE — 99396 PREV VISIT EST AGE 40-64: CPT | Mod: S$GLB,,, | Performed by: OBSTETRICS & GYNECOLOGY

## 2017-12-01 PROCEDURE — 88175 CYTOPATH C/V AUTO FLUID REDO: CPT

## 2017-12-01 NOTE — PROGRESS NOTES
Isela Graf is a 47 y.o. female  who presents for annual exam.  Patient's last menstrual period was 11/10/2017 (approximate).  Menses occur every 4-6 weeks, lasting 5 days in duration.  Over the past year, her flow has become lighter.  No intermenstrual bleeding.  Denies hot flashes / sweats.  She reports having some upper abdominal discomfort that she feels may be related to her gallbladder.  However, evaluation did not reveal any gallbladder disease.  No GYN complaints.    Past Medical History:   Diagnosis Date    Allergy     Anxiety     Depression     Hyperlipidemia     Melanoma 2015    scalp excised by Dr Aldridge    Melanoma of scalp 2015    Open wound of scalp, complicated 2015       Past Surgical History:   Procedure Laterality Date    APPENDECTOMY      EYE SURGERY      Scalp Reconstruction  2015    yin-yang flaps    SKIN SURGERY      removal of cancer    TONSILLECTOMY      WLE scalp melanoma  2015       OB History      Para Term  AB Living    6       4 2    SAB TAB Ectopic Multiple Live Births    3                  ROS:  GENERAL: Feeling well overall.   SKIN: Denies rash or lesions.   HEAD: Denies head injury or headache.   NODES: Denies enlarged lymph nodes.   CHEST: Denies chest pain or shortness of breath.   CARDIOVASCULAR: Denies palpitations or left sided chest pain.   ABDOMEN: Reports episodes of RUQ discomfort.  URINARY: No dysuria or hematuria.  REPRODUCTIVE: See HPI.   BREASTS: Denies pain, lumps, or nipple discharge.   HEMATOLOGIC: No easy bruisability or excessive bleeding.   MUSCULOSKELETAL: Reports right shoulder discomfort.  NEUROLOGIC: Denies syncope or weakness.   PSYCHIATRIC: Denies depression.    PE:   (chaperone present during entire exam)  APPEARANCE: Well nourished, well developed, in no acute distress.  BREASTS: Symmetrical, no skin changes or visible lesions. No palpable masses, nipple discharge or adenopathy  bilaterally.  ABDOMEN: Soft. No tenderness or masses. No hernias. No CVA tenderness.  VULVA: No lesions. Normal female genitalia.  URETHRAL MEATUS: Normal size and location, no lesions, no prolapse.  URETHRA: No masses, tenderness, prolapse or scarring.  VAGINA: Moist and well rugated, no discharge, no significant cystocele or rectocele.  CERVIX: No lesions and discharge. PAP done.  UTERUS: Normal size, regular shape, mobile, non-tender, bladder base nontender.  ADNEXA: No masses, tenderness or CDS nodularity.  ANUS PERINEUM: Normal.      Diagnosis:  1. Well woman exam with routine gynecological exam    2. Perimenopausal    3. Pap smear for cervical cancer screening    4. Visit for screening mammogram          PLAN:    Orders Placed This Encounter    Mammo Digital Screening Bilat with Tomosynthesis CAD    Liquid-based pap smear, screening       Patient was counseled today on perimenopausal issues and expected bleeding patterns.  She was encouraged to follow-up with her PCP for continued evaluation of her upper abdominal discomfort.    Follow-up in 1 year.

## 2017-12-06 ENCOUNTER — PATIENT MESSAGE (OUTPATIENT)
Dept: OBSTETRICS AND GYNECOLOGY | Facility: CLINIC | Age: 47
End: 2017-12-06

## 2017-12-06 ENCOUNTER — HOSPITAL ENCOUNTER (OUTPATIENT)
Dept: RADIOLOGY | Facility: OTHER | Age: 47
Discharge: HOME | End: 2017-12-06
Attending: OBSTETRICS & GYNECOLOGY
Payer: COMMERCIAL

## 2017-12-06 DIAGNOSIS — Z12.31 VISIT FOR SCREENING MAMMOGRAM: ICD-10-CM

## 2017-12-06 PROCEDURE — 77063 BREAST TOMOSYNTHESIS BI: CPT | Mod: 26,,, | Performed by: INTERNAL MEDICINE

## 2017-12-06 PROCEDURE — 77067 SCR MAMMO BI INCL CAD: CPT | Mod: 26,,, | Performed by: INTERNAL MEDICINE

## 2017-12-06 PROCEDURE — 77067 SCR MAMMO BI INCL CAD: CPT | Mod: TC

## 2017-12-07 ENCOUNTER — PATIENT MESSAGE (OUTPATIENT)
Dept: OBSTETRICS AND GYNECOLOGY | Facility: CLINIC | Age: 47
End: 2017-12-07

## 2017-12-19 ENCOUNTER — PATIENT MESSAGE (OUTPATIENT)
Dept: FAMILY MEDICINE | Facility: CLINIC | Age: 47
End: 2017-12-19

## 2017-12-19 ENCOUNTER — HOSPITAL ENCOUNTER (EMERGENCY)
Facility: HOSPITAL | Age: 47
Discharge: HOME | End: 2017-12-20
Attending: FAMILY MEDICINE
Payer: COMMERCIAL

## 2017-12-19 ENCOUNTER — TELEPHONE (OUTPATIENT)
Dept: FAMILY MEDICINE | Facility: CLINIC | Age: 47
End: 2017-12-19

## 2017-12-19 DIAGNOSIS — R10.11 RIGHT UPPER QUADRANT PAIN: Primary | ICD-10-CM

## 2017-12-19 DIAGNOSIS — R10.11 RUQ PAIN: Primary | ICD-10-CM

## 2017-12-19 PROCEDURE — 93010 ELECTROCARDIOGRAM REPORT: CPT | Mod: ,,, | Performed by: INTERNAL MEDICINE

## 2017-12-19 PROCEDURE — 93005 ELECTROCARDIOGRAM TRACING: CPT

## 2017-12-19 PROCEDURE — 96374 THER/PROPH/DIAG INJ IV PUSH: CPT

## 2017-12-19 PROCEDURE — 96361 HYDRATE IV INFUSION ADD-ON: CPT

## 2017-12-19 PROCEDURE — 99284 EMERGENCY DEPT VISIT MOD MDM: CPT | Mod: 25

## 2017-12-19 PROCEDURE — 63600175 PHARM REV CODE 636 W HCPCS: Performed by: FAMILY MEDICINE

## 2017-12-19 PROCEDURE — 96375 TX/PRO/DX INJ NEW DRUG ADDON: CPT

## 2017-12-19 RX ORDER — ONDANSETRON 2 MG/ML
4 INJECTION INTRAMUSCULAR; INTRAVENOUS
Status: COMPLETED | OUTPATIENT
Start: 2017-12-19 | End: 2017-12-20

## 2017-12-19 RX ORDER — MORPHINE SULFATE 2 MG/ML
4 INJECTION, SOLUTION INTRAMUSCULAR; INTRAVENOUS
Status: COMPLETED | OUTPATIENT
Start: 2017-12-19 | End: 2017-12-19

## 2017-12-19 RX ADMIN — MORPHINE SULFATE 4 MG: 2 INJECTION, SOLUTION INTRAMUSCULAR; INTRAVENOUS at 11:12

## 2017-12-19 NOTE — TELEPHONE ENCOUNTER
----- Message from Maeve Franks sent at 12/19/2017  3:09 PM CST -----  Contact:  Mohan # [phone]   would like orders to have ultrasound of abdomen w/concentration on gallbladder. Would like to have done today while patient is experiencing pain. Please put in orders to [facility]

## 2017-12-20 ENCOUNTER — PATIENT MESSAGE (OUTPATIENT)
Dept: FAMILY MEDICINE | Facility: CLINIC | Age: 47
End: 2017-12-20

## 2017-12-20 ENCOUNTER — HOSPITAL ENCOUNTER (OUTPATIENT)
Dept: RADIOLOGY | Facility: HOSPITAL | Age: 47
Discharge: HOME | End: 2017-12-20
Attending: FAMILY MEDICINE
Payer: COMMERCIAL

## 2017-12-20 VITALS
OXYGEN SATURATION: 100 % | TEMPERATURE: 99 F | BODY MASS INDEX: 31.6 KG/M2 | WEIGHT: 170 LBS | DIASTOLIC BLOOD PRESSURE: 54 MMHG | RESPIRATION RATE: 20 BRPM | HEART RATE: 72 BPM | SYSTOLIC BLOOD PRESSURE: 120 MMHG

## 2017-12-20 DIAGNOSIS — R10.11 RUQ PAIN: ICD-10-CM

## 2017-12-20 LAB
ALBUMIN SERPL BCP-MCNC: 4.3 G/DL
ALP SERPL-CCNC: 128 U/L
ALT SERPL W/O P-5'-P-CCNC: 41 U/L
AMYLASE SERPL-CCNC: 50 U/L
ANION GAP SERPL CALC-SCNC: 10 MMOL/L
APTT BLDCRRT: 28.2 SEC
AST SERPL-CCNC: 39 U/L
BASOPHILS # BLD AUTO: 0.06 K/UL
BASOPHILS NFR BLD: 0.5 %
BILIRUB SERPL-MCNC: 0.4 MG/DL
BILIRUB UR QL STRIP: NEGATIVE
BUN SERPL-MCNC: 7 MG/DL
CALCIUM SERPL-MCNC: 8.8 MG/DL
CHLORIDE SERPL-SCNC: 97 MMOL/L
CLARITY UR REFRACT.AUTO: CLEAR
CO2 SERPL-SCNC: 26 MMOL/L
COLOR UR AUTO: NORMAL
CREAT SERPL-MCNC: 0.61 MG/DL
DIFFERENTIAL METHOD: ABNORMAL
EOSINOPHIL # BLD AUTO: 0.3 K/UL
EOSINOPHIL NFR BLD: 2.4 %
ERYTHROCYTE [DISTWIDTH] IN BLOOD BY AUTOMATED COUNT: 19 %
EST. GFR  (AFRICAN AMERICAN): >60 ML/MIN/1.73 M^2
EST. GFR  (NON AFRICAN AMERICAN): >60 ML/MIN/1.73 M^2
GLUCOSE SERPL-MCNC: 94 MG/DL
GLUCOSE UR QL STRIP: NEGATIVE
HCT VFR BLD AUTO: 32.5 %
HGB BLD-MCNC: 10.3 G/DL
HGB UR QL STRIP: NEGATIVE
INR PPP: 1.1
KETONES UR QL STRIP: NEGATIVE
LEUKOCYTE ESTERASE UR QL STRIP: NEGATIVE
LIPASE SERPL-CCNC: 56 U/L
LYMPHOCYTES # BLD AUTO: 3.1 K/UL
LYMPHOCYTES NFR BLD: 27.6 %
MCH RBC QN AUTO: 24.2 PG
MCHC RBC AUTO-ENTMCNC: 31.7 G/DL
MCV RBC AUTO: 76 FL
MONOCYTES # BLD AUTO: 0.9 K/UL
MONOCYTES NFR BLD: 8.3 %
NEUTROPHILS # BLD AUTO: 6.8 K/UL
NEUTROPHILS NFR BLD: 60.9 %
NITRITE UR QL STRIP: NEGATIVE
PH UR STRIP: 7 [PH] (ref 5–8)
PLATELET # BLD AUTO: 359 K/UL
PMV BLD AUTO: 9.7 FL
POTASSIUM SERPL-SCNC: 3.9 MMOL/L
PROT SERPL-MCNC: 7.7 G/DL
PROT UR QL STRIP: NEGATIVE
PROTHROMBIN TIME: 12.2 SEC
RBC # BLD AUTO: 4.26 M/UL
SODIUM SERPL-SCNC: 133 MMOL/L
SP GR UR STRIP: 1 (ref 1–1.03)
TROPONIN I SERPL DL<=0.01 NG/ML-MCNC: <0.012 NG/ML
URN SPEC COLLECT METH UR: NORMAL
UROBILINOGEN UR STRIP-ACNC: NEGATIVE EU/DL
WBC # BLD AUTO: 11.23 K/UL

## 2017-12-20 PROCEDURE — 83690 ASSAY OF LIPASE: CPT

## 2017-12-20 PROCEDURE — 76705 ECHO EXAM OF ABDOMEN: CPT | Mod: TC

## 2017-12-20 PROCEDURE — 85025 COMPLETE CBC W/AUTO DIFF WBC: CPT

## 2017-12-20 PROCEDURE — 85610 PROTHROMBIN TIME: CPT

## 2017-12-20 PROCEDURE — 80053 COMPREHEN METABOLIC PANEL: CPT

## 2017-12-20 PROCEDURE — 63600175 PHARM REV CODE 636 W HCPCS: Performed by: FAMILY MEDICINE

## 2017-12-20 PROCEDURE — 25000003 PHARM REV CODE 250: Performed by: FAMILY MEDICINE

## 2017-12-20 PROCEDURE — 85730 THROMBOPLASTIN TIME PARTIAL: CPT

## 2017-12-20 PROCEDURE — 84484 ASSAY OF TROPONIN QUANT: CPT

## 2017-12-20 PROCEDURE — 25500020 PHARM REV CODE 255: Performed by: FAMILY MEDICINE

## 2017-12-20 PROCEDURE — 81003 URINALYSIS AUTO W/O SCOPE: CPT

## 2017-12-20 PROCEDURE — 82150 ASSAY OF AMYLASE: CPT

## 2017-12-20 RX ORDER — HYDROMORPHONE HYDROCHLORIDE 1 MG/ML
1 INJECTION, SOLUTION INTRAMUSCULAR; INTRAVENOUS; SUBCUTANEOUS
Status: COMPLETED | OUTPATIENT
Start: 2017-12-20 | End: 2017-12-20

## 2017-12-20 RX ORDER — KETOROLAC TROMETHAMINE 30 MG/ML
30 INJECTION, SOLUTION INTRAMUSCULAR; INTRAVENOUS
Status: COMPLETED | OUTPATIENT
Start: 2017-12-20 | End: 2017-12-20

## 2017-12-20 RX ORDER — MORPHINE SULFATE 10 MG/ML
INJECTION INTRAMUSCULAR; INTRAVENOUS; SUBCUTANEOUS
Status: COMPLETED
Start: 2017-12-20 | End: 2017-12-20

## 2017-12-20 RX ADMIN — KETOROLAC TROMETHAMINE 30 MG: 30 INJECTION, SOLUTION INTRAMUSCULAR at 03:12

## 2017-12-20 RX ADMIN — HYDROMORPHONE HYDROCHLORIDE 1 MG: 1 INJECTION, SOLUTION INTRAMUSCULAR; INTRAVENOUS; SUBCUTANEOUS at 03:12

## 2017-12-20 RX ADMIN — ONDANSETRON 4 MG: 2 INJECTION INTRAMUSCULAR; INTRAVENOUS at 12:12

## 2017-12-20 RX ADMIN — SODIUM CHLORIDE 1000 ML: 0.9 INJECTION, SOLUTION INTRAVENOUS at 12:12

## 2017-12-20 RX ADMIN — IOHEXOL 100 ML: 350 INJECTION, SOLUTION INTRAVENOUS at 01:12

## 2017-12-20 NOTE — TELEPHONE ENCOUNTER
----- Message from Haley Meyer sent at 12/20/2017 11:35 AM CST -----  Contact: self/ [phone]  Patient states she's in severe pain she went to ER last night; patient has an appointment with Violeta Pulido NP at 1:pm she does not want to see her she wants to see Dr. Santiago.

## 2017-12-20 NOTE — TELEPHONE ENCOUNTER
Spoke to pt - she will have the ultrasound done today and then go from there.  She will not see Violeta today.      She is requesting a referral to [facility] general surgeon for her gallbladder.

## 2017-12-20 NOTE — ED PROVIDER NOTES
Encounter Date: 12/19/2017       History     Chief Complaint   Patient presents with    Abdominal Pain     abd pain/epigastric pain that radiates to the back started today     Patient has chronic right upper quadrant pain since almost a year and she had several ultrasounds and consults by gastroenterology.  She had also hydroscan.  But could not find the reason for her pain.  Patient says her baseline pain is 3/10 but today it increased to 8/10.  Denies any nausea or vomiting.  Pain radiates to the back from right upper quadrant.  Decreased appetite but no fever.  No diarrhea.  No cough.  No chest pain.  Patient took 2 Norco but pain persisted so she came to the ER for evaluation.      The history is provided by the patient.     Review of patient's allergies indicates:   Allergen Reactions    Sulfa (sulfonamide antibiotics) Other (See Comments)     Stomach pain     Past Medical History:   Diagnosis Date    Allergy     Anxiety     Depression     Hyperlipidemia     Melanoma 1/26/2015    scalp excised by Dr Aldridge    Melanoma of scalp 1/12/2015    Open wound of scalp, complicated 2/6/2015     Past Surgical History:   Procedure Laterality Date    APPENDECTOMY      EYE SURGERY      Scalp Reconstruction  2/9/2015    yin-yang flaps    SKIN SURGERY      removal of cancer    TONSILLECTOMY      WLE scalp melanoma  1/26/2015     Family History   Problem Relation Age of Onset    Hyperlipidemia Mother     Heart attack Mother     Hyperlipidemia Father     Cancer Father     Heart disease Father     Cancer Maternal Grandmother      BCC    Breast cancer Paternal Grandmother 50    Heart attack Paternal Grandmother     Heart disease Paternal Grandmother     Melanoma Neg Hx     Colon cancer Neg Hx     Ovarian cancer Neg Hx      Social History   Substance Use Topics    Smoking status: Never Smoker    Smokeless tobacco: Never Used    Alcohol use Yes      Comment: occasionally     Review of Systems    Constitutional: Negative for activity change, appetite change, chills, fatigue and fever.   HENT: Negative for congestion, ear discharge, ear pain, rhinorrhea, sinus pain and sore throat.    Eyes: Negative for pain, discharge, redness and itching.   Respiratory: Negative for cough, shortness of breath and wheezing.    Cardiovascular: Negative for chest pain and palpitations.   Gastrointestinal: Positive for abdominal pain and nausea. Negative for abdominal distention, diarrhea and vomiting.   Genitourinary: Negative for dysuria, flank pain and frequency.   Musculoskeletal: Negative for back pain, gait problem, neck pain and neck stiffness.   Skin: Negative for rash.   Neurological: Negative for dizziness, tremors, syncope, speech difficulty, weakness, light-headedness, numbness and headaches.   Psychiatric/Behavioral: Negative for confusion and hallucinations. The patient is not nervous/anxious.    All other systems reviewed and are negative.      Physical Exam     Initial Vitals [12/19/17 2312]   BP Pulse Resp Temp SpO2   (!) 152/72 (!) 20 20 97.4 °F (36.3 °C) 98 %      MAP       98.67         Physical Exam    Nursing note and vitals reviewed.  Constitutional: She appears well-developed and well-nourished.   HENT:   Head: Normocephalic.   Right Ear: External ear normal.   Left Ear: External ear normal.   Nose: Nose normal.   Mouth/Throat: Oropharynx is clear and moist.   Eyes: Conjunctivae and EOM are normal. Pupils are equal, round, and reactive to light.   Neck: Normal range of motion. Neck supple.   Cardiovascular: Normal rate, regular rhythm and intact distal pulses.   No murmur heard.  Pulmonary/Chest: Breath sounds normal. No respiratory distress. She has no wheezes. She has no rhonchi. She has no rales. She exhibits no tenderness.   Abdominal: Soft. She exhibits no distension. There is no hepatosplenomegaly. There is tenderness in the right upper quadrant and epigastric area. There is guarding. There is no  rigidity and no rebound.       Musculoskeletal: Normal range of motion.   Neurological: She is alert and oriented to person, place, and time. She has normal strength and normal reflexes. She displays normal reflexes. No cranial nerve deficit or sensory deficit.   Skin: Skin is warm. Capillary refill takes less than 2 seconds. No rash noted. No erythema.         ED Course   Procedures  Labs Reviewed   APTT   AMYLASE   CBC W/ AUTO DIFFERENTIAL   COMPREHENSIVE METABOLIC PANEL   PROTIME-INR   TROPONIN I   URINALYSIS   LIPASE             Medical Decision Making:   Initial Assessment:   Patient presents to ER with acute exacerbation of chronic right upper quadrant pain.  Patient had several CAT scan, ultrasound and HIDA scan.  All the tests so far have been normal for her upper right quadrant pain.  Patient denies any nausea, vomiting or diarrhea.  Differential Diagnosis:   Gastritis, GERD, peptic ulcer, hepatitis, cholelithiasis, biliary colic.  Clinical Tests:   Lab Tests: Ordered and Reviewed  Radiological Study: Ordered and Reviewed  ED Management:  Today again patient had normal labs except alkaline phosphatase and normal pancreatic tests.  Patient pain did not get better so a CAT scan has been ordered.  Which is also negative again today.  Patient is notified about normal CAT scan results.  Patient requested for more pain medication and Toradol, Dilaudid were given.  Patient pain finally improved to 4 which is her baseline.  Patient is advised to follow-up with primary care physician and surgery if pain persist.  Advised to follow-up ER if pain worsens.                   ED Course      Clinical Impression:   The encounter diagnosis was Right upper quadrant pain.    Disposition:   Disposition: Discharged  Condition: Carrillo Sherwood MD  12/20/17 0617

## 2017-12-21 ENCOUNTER — TELEPHONE (OUTPATIENT)
Dept: FAMILY MEDICINE | Facility: CLINIC | Age: 47
End: 2017-12-21

## 2017-12-21 RX ORDER — KETOROLAC TROMETHAMINE 10 MG/1
10 TABLET, FILM COATED ORAL 3 TIMES DAILY PRN
Qty: 30 TABLET | Refills: 1 | Status: SHIPPED | OUTPATIENT
Start: 2017-12-21 | End: 2018-03-23

## 2017-12-21 NOTE — TELEPHONE ENCOUNTER
General Surgery Referral placed  But pt wants you to review U/S results -   She is in a lot of pain  Please advise    Pt wants me to schedule her to see   Dr hPilip Romero at [facility] - I scheduled appt for Jan 11 - at 845. This is the first available.  I have not notified the pt of this appt yet

## 2017-12-21 NOTE — TELEPHONE ENCOUNTER
----- Message from Maeve Franks sent at 12/21/2017  4:12 PM CST -----  Contact: Mohan [phone]    Mohan stopped by office. Gave him copy of Ultrasound/CT scan reports. I advised that these reports have not been reviewed by Dr Santiago.   Also,  is requesting a different pain pill for patient. Says Hydrocodone 10/325 ('s med) is not helping relieve pain.  is requesting Rx for Toradol to help with pain until patient sees Dr Romero on the 11th of [place]    [facility]

## 2017-12-21 NOTE — TELEPHONE ENCOUNTER
Maeve Gonzalez Staff   Caller: Unspecified (Today, 10:24 AM)             Please call with gallbladder results. General surgeon called her to schedule an appt already

## 2017-12-27 ENCOUNTER — TELEPHONE (OUTPATIENT)
Dept: SURGERY | Facility: CLINIC | Age: 47
End: 2017-12-27

## 2017-12-27 NOTE — TELEPHONE ENCOUNTER
----- Message from Eliazar Khan sent at 12/27/2017 10:00 AM CST -----  Patient states that (s)he needs to speak with nurse in ref to possibly being seen sooner due her gall bladder issues worsening//please call back at [phone]//thank you

## 2018-01-09 ENCOUNTER — OFFICE VISIT (OUTPATIENT)
Dept: SURGERY | Facility: CLINIC | Age: 48
End: 2018-01-09
Payer: COMMERCIAL

## 2018-01-09 VITALS
BODY MASS INDEX: 33.99 KG/M2 | HEIGHT: 61 IN | DIASTOLIC BLOOD PRESSURE: 83 MMHG | WEIGHT: 180 LBS | TEMPERATURE: 98 F | SYSTOLIC BLOOD PRESSURE: 136 MMHG | HEART RATE: 88 BPM

## 2018-01-09 DIAGNOSIS — R10.11 RUQ PAIN: Primary | ICD-10-CM

## 2018-01-09 PROCEDURE — 99213 OFFICE O/P EST LOW 20 MIN: CPT | Mod: S$GLB,,, | Performed by: SURGERY

## 2018-01-09 PROCEDURE — 99999 PR PBB SHADOW E&M-EST. PATIENT-LVL IV: CPT | Mod: PBBFAC,,, | Performed by: SURGERY

## 2018-01-09 NOTE — Clinical Note
January 9, 2018      Carlos Santiago MD  [address] [place] [id number]           [facility] - General Surgery  [address]  [place] [address]  Phone: [phone]          Patient: Isela Graf   MR Number: [id number]   YOB: 1970   Date of Visit: 1/9/2018       Dear Dr. Carlos Santiago:    Thank you for referring Isela Graf to me for evaluation. Attached you will find relevant portions of my assessment and plan of care.    If you have questions, please do not hesitate to call me. I look forward to following Isela Graf along with you.    Sincerely,    Philip Romero MD    Enclosure  CC:  No Recipients    If you would like to receive this communication electronically, please contact [email or web address] or [phone] to request more information on [facility] access.    For providers and/or their staff who would like to refer a patient to [facility], please contact us through our one-stop-shop provider referral line, [facility], at [phone].    If you feel you have received this communication in error or would no longer like to receive these types of communications, please e-mail [email or web address]

## 2018-01-09 NOTE — LETTER
[facility] - General Surgery  [address]  [facility] [address]  Phone: [phone] January 9, 2018      Carlos Santiago MD  [address] [facility] [address]    Patient: Isela Graf   MR Number: [id number]   YOB: 1970   Date of Visit: 1/9/2018     Dear Dr. Santiago:    Thank you for referring Isela Graf to me for evaluation. Below are the relevant portions of my assessment and plan of care.    ASSESSMENT:  Patient is a 47-year-old female who was seen two years prior with similar complaints of RUQ pain and was referred to GI for evaluation after gallbladder imaging returned normal.  Recent labs, US, and CT without abnormality.  Symptoms to no correlate with typical biliary colic or dyskinesia symptoms.      PLAN;  Recommend repeating HIDA scan for review here.    If you have questions, please do not hesitate to call me. I look forward to following Isela along with you.    Sincerely,      Philip Romero MD   Section Head - General, Laparoscopic, Bariatric  Acute Care and Oncologic Surgery   - Surgical Weight Loss Program  [facility]    [email or web address]/lex

## 2018-01-09 NOTE — PROGRESS NOTES
[facility]  General Surgery  History & Physical    Patient Name: Isela Graf  MRN: [id number]  Primary Care Provider: Carlos Santiago MD      Subjective:     Chief Complaint: RUQ abdominal pain    History of Present Illness:  Patient is a 47 y.o. female presents with RUQ pain that has been present for years.  Over the past few months it has become a constant pain.  She was seen two years ago with similar complaints and referred to GI after a normal HIDA, CT, US of the RUQ.  Pain is worse a day or two after eating pork.  She has some mild associated nausea.  She has been taking percocet and recently Toradol for the pain.  Prior EGD was negative for pathological causes of pain.    Pt is convinced it is her gallbladder.  No relieving factors    Current Outpatient Prescriptions on File Prior to Visit   Medication Sig    aspirin (ECOTRIN) 81 MG EC tablet Take 81 mg by mouth once daily.    citalopram (CELEXA) 10 MG tablet Take 1 tablet (10 mg total) by mouth every evening.    esomeprazole (NEXIUM) 20 MG capsule Take 1 capsule (20 mg total) by mouth before breakfast.    fexofenadine (ALLEGRA) 180 MG tablet Take 180 mg by mouth as needed.     fish oil-omega-3 fatty acids 300-1,000 mg capsule Take 2 g by mouth after lunch.     fluticasone (FLONASE) 50 mcg/actuation nasal spray 2 sprays by Each Nare route daily as needed.    gabapentin (NEURONTIN) 300 MG capsule Take 1 capsule (300 mg total) by mouth every evening. For pain    ketorolac (TORADOL) 10 mg tablet Take 1 tablet (10 mg total) by mouth 3 (three) times daily as needed for Pain.    MULTIVIT-IRON-MIN-FOLIC ACID 3,500-18-0.4 UNIT-MG-MG ORAL CHEW Take 1 tablet by mouth after lunch.     rosuvastatin (CRESTOR) 10 MG tablet Take 1 tablet (10 mg total) by mouth once daily.    alprazolam (XANAX) 0.5 MG tablet Take 1 tablet (0.5 mg total) by mouth 2 (two) times daily as needed for Anxiety.     No current facility-administered medications on file  prior to visit.        Review of patient's allergies indicates:   Allergen Reactions    Sulfa (sulfonamide antibiotics) Other (See Comments)     Stomach pain       Past Medical History:   Diagnosis Date    Allergy     Anxiety     Depression     Hyperlipidemia     Melanoma 1/26/2015    scalp excised by Dr Aldridge    Melanoma of scalp 1/12/2015    Open wound of scalp, complicated 2/6/2015     Past Surgical History:   Procedure Laterality Date    APPENDECTOMY      EYE SURGERY      Scalp Reconstruction  2/9/2015    yin-yang flaps    SKIN SURGERY      removal of cancer    TONSILLECTOMY      WLE scalp melanoma  1/26/2015     Family History     Problem Relation (Age of Onset)    Breast cancer Paternal Grandmother (50)    Cancer Father, Maternal Grandmother    Heart attack Mother, Paternal Grandmother    Heart disease Father, Paternal Grandmother    Hyperlipidemia Mother, Father        Social History Main Topics    Smoking status: Never Smoker    Smokeless tobacco: Never Used    Alcohol use Yes      Comment: occasionally    Drug use: No    Sexual activity: Yes     Partners: Male     Birth control/ protection: None     Review of Systems   10 point ROS negative except as above.  Notable positives, RUQ pain constant.  Mild nasuea.  Denies fevers or chills.  No CP, SOB.  Remaining negative  Objective:     Vital Signs (Most Recent):  Temp: 97.9 °F (36.6 °C) (01/09/18 0836)  Pulse: 88 (01/09/18 0836)  BP: 136/83 (01/09/18 0836) Vital Signs (24h Range):  [unfilled]     Weight: 81.7 kg (180 lb 0.1 oz)  Body mass index is 34.01 kg/m².    Physical Exam  Gen: NAD  CV: RRR  Pulm: Unlabored  GI: Soft, NT, ND.  Negative bryant.  Subjective TTP RUQ, RLQ sacr well healed  Ext: No edema  Neuro: Non-focal  Skin: No rashes    Significant Labs:  CBC, CMP, Amylase, Lipase reviewed: Normal    Significant Diagnostics:  CT and US reviewed and normal    Prior office visit  Notes reviewed: Had OSH HIDA 2 years prior-normal.  Noraml  EGD    Assessment/Plan:     48 y/o F who was seen 2 years prior with similar complaints of RUQ pain and was reffered to GI for evaluation after gallbladder imaging returned normal.  Recent labs, US, and CT without abnormality.  Symptoms to no correlate with typical biliary colic or dyskinesia symptoms.      Recommend repeating HIDA scan for review here.      Chucky Lino MD  General Surgery  [facility]

## 2018-01-09 NOTE — PROGRESS NOTES
I have seen the patient, reviewed the Resident's history and physical, assessment and plan. I have personally interviewed and examined the patient at bedside and: agree with the findings.     Seen 2 years ago and now with worse ruq pain and more nausea.  Will repeat cckhida and obtain results of egd.  Consider ges.

## 2018-01-18 ENCOUNTER — HOSPITAL ENCOUNTER (OUTPATIENT)
Dept: RADIOLOGY | Facility: HOSPITAL | Age: 48
Discharge: HOME | End: 2018-01-18
Attending: SURGERY
Payer: COMMERCIAL

## 2018-01-18 DIAGNOSIS — R10.11 RUQ PAIN: ICD-10-CM

## 2018-01-18 PROCEDURE — 78227 HEPATOBIL SYST IMAGE W/DRUG: CPT | Mod: 26,,, | Performed by: RADIOLOGY

## 2018-01-18 PROCEDURE — A9537 TC99M MEBROFENIN: HCPCS

## 2018-01-19 ENCOUNTER — TELEPHONE (OUTPATIENT)
Dept: SURGERY | Facility: CLINIC | Age: 48
End: 2018-01-19

## 2018-01-19 NOTE — TELEPHONE ENCOUNTER
----- Message from Eliazar Khan sent at 1/19/2018  2:50 PM CST -----  Patient states that (s)he needs to speak with nurse in ref to getting test results from yesterday//please call pt back at [phone]//thank you

## 2018-01-19 NOTE — TELEPHONE ENCOUNTER
Patient was calling for hida scan results which weren't ready yet. She will call on Monday and hopefully schedule her lap melyssa for next week.

## 2018-01-22 ENCOUNTER — TELEPHONE (OUTPATIENT)
Dept: SURGERY | Facility: CLINIC | Age: 48
End: 2018-01-22

## 2018-01-22 NOTE — TELEPHONE ENCOUNTER
Spoke to patient about receiving the results on 1/19 @ 330 pm and the doctor was not able to review them yet. I will forward the results and have him let her know what the plan will be the.  patient v/u

## 2018-01-23 ENCOUNTER — TELEPHONE (OUTPATIENT)
Dept: SURGERY | Facility: CLINIC | Age: 48
End: 2018-01-23

## 2018-01-23 NOTE — TELEPHONE ENCOUNTER
----- Message from Philip Romero MD sent at 1/22/2018  4:55 PM CST -----  Please let her know.  Further workup could consider egd and ges.  We may not find a surgical reason for her pain.

## 2018-01-23 NOTE — TELEPHONE ENCOUNTER
Notified pt of results per Dr. Romero.  We will get o/s EGD and cscope results and then have Dr. Romero to review to see how we will move forward.  Pt ok with plan.

## 2018-01-24 ENCOUNTER — OFFICE VISIT (OUTPATIENT)
Dept: FAMILY MEDICINE | Facility: CLINIC | Age: 48
End: 2018-01-24
Payer: COMMERCIAL

## 2018-01-24 VITALS
WEIGHT: 181.19 LBS | DIASTOLIC BLOOD PRESSURE: 82 MMHG | BODY MASS INDEX: 34.24 KG/M2 | TEMPERATURE: 98 F | OXYGEN SATURATION: 98 % | RESPIRATION RATE: 15 BRPM | SYSTOLIC BLOOD PRESSURE: 131 MMHG | HEART RATE: 97 BPM

## 2018-01-24 DIAGNOSIS — Z23 NEED FOR TDAP VACCINATION: ICD-10-CM

## 2018-01-24 DIAGNOSIS — R30.0 DYSURIA: ICD-10-CM

## 2018-01-24 DIAGNOSIS — N39.0 URINARY TRACT INFECTION WITHOUT HEMATURIA, SITE UNSPECIFIED: ICD-10-CM

## 2018-01-24 DIAGNOSIS — J30.89 ACUTE NONSEASONAL ALLERGIC RHINITIS DUE TO OTHER ALLERGEN: Primary | ICD-10-CM

## 2018-01-24 LAB
BILIRUB SERPL-MCNC: ABNORMAL MG/DL
BLOOD URINE, POC: ABNORMAL
COLOR, POC UA: YELLOW
GLUCOSE UR QL STRIP: ABNORMAL
KETONES UR QL STRIP: ABNORMAL
LEUKOCYTE ESTERASE URINE, POC: ABNORMAL
NITRITE, POC UA: ABNORMAL
PH, POC UA: 5
PROTEIN, POC: ABNORMAL
SPECIFIC GRAVITY, POC UA: 1.01
UROBILINOGEN, POC UA: ABNORMAL

## 2018-01-24 PROCEDURE — 90715 TDAP VACCINE 7 YRS/> IM: CPT | Mod: S$GLB,,, | Performed by: FAMILY MEDICINE

## 2018-01-24 PROCEDURE — 81002 URINALYSIS NONAUTO W/O SCOPE: CPT | Mod: S$GLB,,, | Performed by: NURSE PRACTITIONER

## 2018-01-24 PROCEDURE — 90471 IMMUNIZATION ADMIN: CPT | Mod: S$GLB,,, | Performed by: FAMILY MEDICINE

## 2018-01-24 PROCEDURE — 99214 OFFICE O/P EST MOD 30 MIN: CPT | Mod: 25,S$GLB,, | Performed by: NURSE PRACTITIONER

## 2018-01-24 RX ORDER — FLUTICASONE PROPIONATE 50 MCG
2 SPRAY, SUSPENSION (ML) NASAL DAILY PRN
Qty: 1 BOTTLE | Refills: 2 | Status: SHIPPED | OUTPATIENT
Start: 2018-01-24 | End: 2018-03-23 | Stop reason: SDUPTHER

## 2018-01-24 RX ORDER — NITROFURANTOIN (MACROCRYSTALS) 100 MG/1
100 CAPSULE ORAL EVERY 12 HOURS
Qty: 6 CAPSULE | Refills: 0 | Status: SHIPPED | OUTPATIENT
Start: 2018-01-24 | End: 2018-03-23

## 2018-01-24 NOTE — PROGRESS NOTES
Subjective:       Patient ID: Isela Graf is a 47 y.o. female.    Chief Complaint: Dysuria    Dysuria    This is a new problem. The current episode started in the past 7 days. The problem occurs every urination. The problem has been unchanged. The quality of the pain is described as burning. The patient is experiencing no pain. There has been no fever. Associated symptoms include frequency and urgency. Pertinent negatives include no behavior changes, chills, discharge, flank pain, hematuria, hesitancy, nausea, possible pregnancy, sweats, vomiting, weight loss, bubble bath use, constipation, rash or withholding. She has tried nothing for the symptoms. There is no history of catheterization, diabetes insipidus, diabetes mellitus, genitourinary reflux, hypertension, kidney stones, recurrent UTIs, a single kidney, STD, urinary stasis or a urological procedure.     Review of Systems   Constitutional: Negative for chills, diaphoresis, fatigue, fever and weight loss.   Gastrointestinal: Negative for constipation, nausea and vomiting.   Genitourinary: Positive for dysuria, frequency and urgency. Negative for flank pain, hematuria and hesitancy.   Skin: Negative for rash.       Objective:      Physical Exam   Constitutional: She is oriented to person, place, and time. She appears well-developed and well-nourished. No distress.   HENT:   Right Ear: Tympanic membrane and external ear normal.   Left Ear: Tympanic membrane and external ear normal.   Nose: Mucosal edema and rhinorrhea present. Right sinus exhibits no maxillary sinus tenderness and no frontal sinus tenderness. Left sinus exhibits no maxillary sinus tenderness and no frontal sinus tenderness.   Mouth/Throat: No oropharyngeal exudate, posterior oropharyngeal edema or posterior oropharyngeal erythema.   Cardiovascular: Normal rate, regular rhythm and normal heart sounds.    No murmur heard.  Pulmonary/Chest: Effort normal and breath sounds normal. No respiratory  distress. She has no wheezes.   Neurological: She is alert and oriented to person, place, and time.   Skin: Skin is warm and dry. No rash noted. She is not diaphoretic. No erythema. No pallor.   Psychiatric: She has a normal mood and affect. Her behavior is normal. Judgment and thought content normal.   Vitals reviewed.      Assessment:       1. Acute nonseasonal allergic rhinitis due to other allergen    2. Dysuria    3. Urinary tract infection without hematuria, site unspecified        Plan:       Acute nonseasonal allergic rhinitis due to other allergen  -     fluticasone (FLONASE) 50 mcg/actuation nasal spray; 2 sprays (100 mcg total) by Each Nare route daily as needed.  Dispense: 1 Bottle; Refill: 2    Dysuria  -     POCT URINE DIPSTICK WITHOUT MICROSCOPE    Urinary tract infection without hematuria, site unspecified  -     nitrofurantoin (MACRODANTIN) 100 MG capsule; Take 1 capsule (100 mg total) by mouth every 12 (twelve) hours.  Dispense: 6 capsule; Refill: 0

## 2018-01-25 ENCOUNTER — TELEPHONE (OUTPATIENT)
Dept: FAMILY MEDICINE | Facility: CLINIC | Age: 48
End: 2018-01-25

## 2018-01-25 ENCOUNTER — PATIENT MESSAGE (OUTPATIENT)
Dept: FAMILY MEDICINE | Facility: CLINIC | Age: 48
End: 2018-01-25

## 2018-01-25 DIAGNOSIS — K80.50 BILIARY COLIC: Primary | ICD-10-CM

## 2018-03-22 ENCOUNTER — TELEPHONE (OUTPATIENT)
Dept: FAMILY MEDICINE | Facility: CLINIC | Age: 48
End: 2018-03-22

## 2018-03-22 NOTE — TELEPHONE ENCOUNTER
----- Message from Charleen Wang sent at 3/22/2018  9:16 AM CDT -----  Contact: the pt  Patient would like to be seen today.  Please call her at [phone]    Symptoms: Wheezing and back pain    How long has patient had these symptoms: Couple of days for the wheezing    If unable to be seen , can something be called into patient pharmacy? Really want to see the doctor    Pharmacy name: [facility]    Any drug allergies:

## 2018-03-23 ENCOUNTER — OFFICE VISIT (OUTPATIENT)
Dept: FAMILY MEDICINE | Facility: CLINIC | Age: 48
End: 2018-03-23
Payer: COMMERCIAL

## 2018-03-23 VITALS
DIASTOLIC BLOOD PRESSURE: 80 MMHG | BODY MASS INDEX: 35.05 KG/M2 | HEART RATE: 98 BPM | WEIGHT: 185.63 LBS | OXYGEN SATURATION: 100 % | SYSTOLIC BLOOD PRESSURE: 124 MMHG | TEMPERATURE: 98 F | HEIGHT: 61 IN

## 2018-03-23 DIAGNOSIS — J30.89 ACUTE NONSEASONAL ALLERGIC RHINITIS DUE TO OTHER ALLERGEN: ICD-10-CM

## 2018-03-23 DIAGNOSIS — J45.20 MILD INTERMITTENT EXTRINSIC ASTHMA WITHOUT COMPLICATION: ICD-10-CM

## 2018-03-23 PROBLEM — J45.909 EXTRINSIC ASTHMA WITHOUT COMPLICATION: Status: ACTIVE | Noted: 2018-03-23

## 2018-03-23 PROCEDURE — 99213 OFFICE O/P EST LOW 20 MIN: CPT | Mod: 25,S$GLB,, | Performed by: FAMILY MEDICINE

## 2018-03-23 PROCEDURE — 90471 IMMUNIZATION ADMIN: CPT | Mod: S$GLB,,, | Performed by: FAMILY MEDICINE

## 2018-03-23 PROCEDURE — 90670 PCV13 VACCINE IM: CPT | Mod: S$GLB,,, | Performed by: FAMILY MEDICINE

## 2018-03-23 RX ORDER — FLUTICASONE PROPIONATE 50 MCG
2 SPRAY, SUSPENSION (ML) NASAL DAILY PRN
Qty: 1 BOTTLE | Refills: 2 | Status: SHIPPED | OUTPATIENT
Start: 2018-03-23 | End: 2020-10-24 | Stop reason: SDUPTHER

## 2018-03-23 RX ORDER — HYDROCODONE BITARTRATE AND ACETAMINOPHEN 7.5; 325 MG/1; MG/1
TABLET ORAL
COMMUNITY
Start: 2018-01-31 | End: 2019-04-05

## 2018-03-23 RX ORDER — ALBUTEROL SULFATE 90 UG/1
2 AEROSOL, METERED RESPIRATORY (INHALATION) EVERY 6 HOURS PRN
Qty: 18 G | Refills: 0 | Status: SHIPPED | OUTPATIENT
Start: 2018-03-23 | End: 2018-12-31 | Stop reason: SDUPTHER

## 2018-03-23 RX ORDER — CODEINE PHOSPHATE AND GUAIFENESIN 10; 100 MG/5ML; MG/5ML
10 SOLUTION ORAL EVERY 4 HOURS PRN
Qty: 240 ML | Refills: 0 | Status: SHIPPED | OUTPATIENT
Start: 2018-03-23 | End: 2018-04-02

## 2018-03-23 NOTE — PROGRESS NOTES
Subjective:      Patient ID: Isela Graf is a 47 y.o. female.    Chief Complaint: Cough (wheezing )    Chest wheezing for few days and coughing; worked at day care for 2 qweeks, all sick babies; no fever, productive; chest hurts; took mucinex; ; has happened in past; not a smoker; not asthmatic      Cough   Associated symptoms include rhinorrhea and wheezing.     Review of Systems   HENT: Positive for rhinorrhea and sneezing.    Respiratory: Positive for cough and wheezing.    All other systems reviewed and are negative.    Objective:     Physical Exam   Constitutional: She is oriented to person, place, and time. She appears well-developed and well-nourished.   HENT:   Head: Normocephalic.   Eyes: Conjunctivae and EOM are normal. Pupils are equal, round, and reactive to light.   Neck: Normal range of motion. Neck supple.   Cardiovascular: Normal rate, regular rhythm and normal heart sounds.    Pulmonary/Chest: Effort normal. She has wheezes.   Musculoskeletal: Normal range of motion.   Neurological: She is alert and oriented to person, place, and time. She has normal reflexes.   Skin: Skin is warm and dry.   Psychiatric: She has a normal mood and affect. Her behavior is normal. Judgment and thought content normal.   Nursing note and vitals reviewed.    Assessment:     1. Mild intermittent extrinsic asthma without complication    2. Acute nonseasonal allergic rhinitis due to other allergen      Plan:     New Prescriptions    ALBUTEROL 90 MCG/ACTUATION INHALER    Inhale 2 puffs into the lungs every 6 (six) hours as needed for Wheezing.     Discontinued Medications    KETOROLAC (TORADOL) 10 MG TABLET    Take 1 tablet (10 mg total) by mouth 3 (three) times daily as needed for Pain.    NITROFURANTOIN (MACRODANTIN) 100 MG CAPSULE    Take 1 capsule (100 mg total) by mouth every 12 (twelve) hours.     Modified Medications    Modified Medication Previous Medication    FLUTICASONE (FLONASE) 50 MCG/ACTUATION NASAL SPRAY  fluticasone (FLONASE) 50 mcg/actuation nasal spray       2 sprays (100 mcg total) by Each Nare route daily as needed.    2 sprays (100 mcg total) by Each Nare route daily as needed.       Mild intermittent extrinsic asthma without complication    Acute nonseasonal allergic rhinitis due to other allergen  -     fluticasone (FLONASE) 50 mcg/actuation nasal spray; 2 sprays (100 mcg total) by Each Nare route daily as needed.  Dispense: 1 Bottle; Refill: 2    Other orders  -     albuterol 90 mcg/actuation inhaler; Inhale 2 puffs into the lungs every 6 (six) hours as needed for Wheezing.  Dispense: 18 g; Refill: 0  -     guaifenesin-codeine 100-10 mg/5 ml (TUSSI-ORGANIDIN NR)  mg/5 mL syrup; Take 10 mLs by mouth every 4 (four) hours as needed for Cough.  Dispense: 240 mL; Refill: 0  -     Pneumococcal Conjugate Vaccine (13 Valent) (IM)

## 2018-05-09 ENCOUNTER — LAB VISIT (OUTPATIENT)
Dept: LAB | Facility: HOSPITAL | Age: 48
End: 2018-05-09
Attending: FAMILY MEDICINE
Payer: COMMERCIAL

## 2018-05-09 ENCOUNTER — TELEPHONE (OUTPATIENT)
Dept: FAMILY MEDICINE | Facility: CLINIC | Age: 48
End: 2018-05-09

## 2018-05-09 DIAGNOSIS — Z00.00 WELLNESS EXAMINATION: Primary | ICD-10-CM

## 2018-05-09 DIAGNOSIS — Z00.00 WELLNESS EXAMINATION: ICD-10-CM

## 2018-05-09 LAB
ALBUMIN SERPL BCP-MCNC: 4.5 G/DL
ALP SERPL-CCNC: 129 U/L
ALT SERPL W/O P-5'-P-CCNC: 46 U/L
ANION GAP SERPL CALC-SCNC: 12 MMOL/L
AST SERPL-CCNC: 50 U/L
BASOPHILS # BLD AUTO: 0.05 K/UL
BASOPHILS NFR BLD: 0.6 %
BILIRUB SERPL-MCNC: 0.3 MG/DL
BUN SERPL-MCNC: 10 MG/DL
CALCIUM SERPL-MCNC: 9.5 MG/DL
CHLORIDE SERPL-SCNC: 102 MMOL/L
CHOLEST SERPL-MCNC: 202 MG/DL
CHOLEST/HDLC SERPL: 2.8 {RATIO}
CO2 SERPL-SCNC: 29 MMOL/L
CREAT SERPL-MCNC: 0.63 MG/DL
DIFFERENTIAL METHOD: ABNORMAL
EOSINOPHIL # BLD AUTO: 0.2 K/UL
EOSINOPHIL NFR BLD: 2.3 %
ERYTHROCYTE [DISTWIDTH] IN BLOOD BY AUTOMATED COUNT: 19.4 %
EST. GFR  (AFRICAN AMERICAN): >60 ML/MIN/1.73 M^2
EST. GFR  (NON AFRICAN AMERICAN): >60 ML/MIN/1.73 M^2
GLUCOSE SERPL-MCNC: 100 MG/DL
HCT VFR BLD AUTO: 37.1 %
HDLC SERPL-MCNC: 72 MG/DL
HDLC SERPL: 35.6 %
HGB BLD-MCNC: 11.2 G/DL
LDLC SERPL CALC-MCNC: 91 MG/DL
LYMPHOCYTES # BLD AUTO: 1.8 K/UL
LYMPHOCYTES NFR BLD: 20.5 %
MCH RBC QN AUTO: 23.4 PG
MCHC RBC AUTO-ENTMCNC: 30.2 G/DL
MCV RBC AUTO: 78 FL
MONOCYTES # BLD AUTO: 0.8 K/UL
MONOCYTES NFR BLD: 9 %
NEUTROPHILS # BLD AUTO: 5.8 K/UL
NEUTROPHILS NFR BLD: 67.5 %
NONHDLC SERPL-MCNC: 130 MG/DL
PLATELET # BLD AUTO: 368 K/UL
PMV BLD AUTO: 11 FL
POTASSIUM SERPL-SCNC: 4.7 MMOL/L
PROT SERPL-MCNC: 7.9 G/DL
RBC # BLD AUTO: 4.78 M/UL
SODIUM SERPL-SCNC: 143 MMOL/L
TRIGL SERPL-MCNC: 195 MG/DL
WBC # BLD AUTO: 8.58 K/UL

## 2018-05-09 PROCEDURE — 36415 COLL VENOUS BLD VENIPUNCTURE: CPT | Mod: PO

## 2018-05-09 PROCEDURE — 80053 COMPREHEN METABOLIC PANEL: CPT | Mod: PO

## 2018-05-09 PROCEDURE — 80061 LIPID PANEL: CPT

## 2018-05-09 PROCEDURE — 85025 COMPLETE CBC W/AUTO DIFF WBC: CPT | Mod: PO

## 2018-05-11 ENCOUNTER — TELEPHONE (OUTPATIENT)
Dept: FAMILY MEDICINE | Facility: CLINIC | Age: 48
End: 2018-05-11

## 2018-05-11 DIAGNOSIS — R79.89 ELEVATED LFTS: ICD-10-CM

## 2018-05-11 DIAGNOSIS — K76.0 FATTY LIVER: Primary | ICD-10-CM

## 2018-05-11 NOTE — TELEPHONE ENCOUNTER
----- Message from Carlos Santiago MD sent at 5/11/2018  7:12 AM CDT -----  Elevated liver function tests; need to go back to Dr Rubio; had fatty liver on ultrasound

## 2018-05-11 NOTE — TELEPHONE ENCOUNTER
Left detailed message advising patient of elevated liver functions;appointment has been scheduled with Dr. Rubio on 06/07/2018

## 2018-06-07 ENCOUNTER — LAB VISIT (OUTPATIENT)
Dept: LAB | Facility: HOSPITAL | Age: 48
End: 2018-06-07
Attending: INTERNAL MEDICINE
Payer: COMMERCIAL

## 2018-06-07 ENCOUNTER — OFFICE VISIT (OUTPATIENT)
Dept: GASTROENTEROLOGY | Facility: CLINIC | Age: 48
End: 2018-06-07
Payer: COMMERCIAL

## 2018-06-07 VITALS
BODY MASS INDEX: 36.02 KG/M2 | HEIGHT: 61 IN | DIASTOLIC BLOOD PRESSURE: 86 MMHG | HEART RATE: 81 BPM | WEIGHT: 190.81 LBS | SYSTOLIC BLOOD PRESSURE: 125 MMHG

## 2018-06-07 DIAGNOSIS — K76.0 FATTY LIVER: ICD-10-CM

## 2018-06-07 DIAGNOSIS — R79.89 LFTS ABNORMAL: ICD-10-CM

## 2018-06-07 DIAGNOSIS — R79.89 LFTS ABNORMAL: Primary | ICD-10-CM

## 2018-06-07 LAB
FERRITIN SERPL-MCNC: 14 NG/ML
IRON SERPL-MCNC: 44 UG/DL
SATURATED IRON: 8 %
TOTAL IRON BINDING CAPACITY: 531 UG/DL
TRANSFERRIN SERPL-MCNC: 359 MG/DL

## 2018-06-07 PROCEDURE — 3008F BODY MASS INDEX DOCD: CPT | Mod: CPTII,S$GLB,, | Performed by: INTERNAL MEDICINE

## 2018-06-07 PROCEDURE — 99214 OFFICE O/P EST MOD 30 MIN: CPT | Mod: S$GLB,,, | Performed by: INTERNAL MEDICINE

## 2018-06-07 PROCEDURE — 36415 COLL VENOUS BLD VENIPUNCTURE: CPT | Mod: PO

## 2018-06-07 PROCEDURE — 80074 ACUTE HEPATITIS PANEL: CPT | Mod: PO

## 2018-06-07 PROCEDURE — 83540 ASSAY OF IRON: CPT | Mod: PO

## 2018-06-07 PROCEDURE — 99999 PR PBB SHADOW E&M-EST. PATIENT-LVL III: CPT | Mod: PBBFAC,,, | Performed by: INTERNAL MEDICINE

## 2018-06-07 PROCEDURE — 82728 ASSAY OF FERRITIN: CPT

## 2018-06-07 NOTE — LETTER
June 7, 2018      Carlos Satniago MD  [address] [facility] [address]           [facility]  [address], [address],  [facility] [address]  Phone: [phone]  Fax: [phone]          Patient: Isela Graf   MR Number: [id number]   YOB: 1970   Date of Visit: 6/7/2018       Dear Dr. Carlos Santiago:    Thank you for referring Isela Graf to me for evaluation. Attached you will find relevant portions of my assessment and plan of care.    If you have questions, please do not hesitate to call me. I look forward to following Isela Graf along with you.    Sincerely,    Chuck Rubio Jr., MD    Enclosure  CC:  No Recipients    If you would like to receive this communication electronically, please contact [email or web address] or [phone] to request more information on [employer] access.    For providers and/or their staff who would like to refer a patient to [facility], please contact us through our one-stop-shop provider referral line, [facility], at [phone].    If you feel you have received this communication in error or would no longer like to receive these types of communications, please e-mail [email or web address]

## 2018-06-07 NOTE — PATIENT INSTRUCTIONS
Fatty liver fatty liver fatty liver  Nonalcoholic Fatty Liver Disease (NAFLD)  Nonalcoholic fatty liver disease (NAFLD) is a common disease of the liver. It occurs when you have too much fat in the liver. If NAFLD is severe, it can cause liver damage that seems like the damage caused by drinking too much alcohol. But NAFLD is not caused by drinking alcohol. This sheet tells you more about NAFLD and how it can be managed.    How the liver works   The liver is an organ in the upper right side of the belly (abdomen). It has many important jobs. These include:  · Breaking down (metabolizing) proteins, carbohydrates, and fats  · Making a substance called bile that helps break down fats  · Storing and releasing sugar (glucose) into the blood to give the body energy  · Removing toxins from the blood  · Helping with blood clotting  Understanding NAFLD  A healthy liver may contain some fat. But if too much fat builds up in the liver, this causes NAFLD. NAFLD can be mild, causing fatty liver. Or it can be more severe and show inflammation, as well as the fat. This can cause non-alcoholic steatohepatitis (ESPINAL).  · Fatty liver. With fatty liver, the liver simply has more fat than normal. This extra fat usually does not harm the liver.  · ESPINAL. With ESPINAL, the fatty liver becomes inflamed over time. ESPINAL is serious because it can lead to scarring of the liver (fibrosis). Over time, the scarring may lead to cirrhosis of the liver. This can eventually cause liver failure or liver cancer.  Causes and risk factors of NAFLD  Doctors don't know what causes NAFLD. But certain things make the problem more likely to happen. These include:  · Obesity  · Prediabetes or diabetes  · High levels of fat found in the blood (cholesterol and triglycerides)  · Being exposed to certain medicines   Symptoms of NAFLD  Most people with NAFLD have no symptoms. If symptoms do occur, they can include:  · Tiredness  · Weakness  · Weight loss  · Loss of  appetite  · Nausea and vomiting  · Belly pain and cramping  · Yellowing of the skin and eyes (jaundice), as well as dark urine, or light-colored stools  · Swelling in the belly or legs  Diagnosing NAFLD  Your healthcare provider may think you have NAFLD if routine blood tests show high levels of liver enzymes. This may mean you have a liver problem. You may need one or more imaging tests, such as an ultrasound, CT, or MRI. You may need more blood tests to look for other causes of liver disease. You may also need a liver biopsy. During this test, a hollow needle is used to remove a tiny tissue sample from your liver. This tissue is then checked in a lab. This test can find signs of damage to liver tissue. It can also help figure out the cause of the damage and tell the difference between fatty liver and ESPINAL.  Treating NAFLD  Treatment for NAFLD varies for each person. The best early treatment is to treat any underlying conditions causing metabolic syndrome. This is the name for a group of conditions that includes:  · High blood pressure  · High levels of cholesterol and triglycerides  · Being overweight or obese  · Diabetes  Your healthcare provider will monitor your health and treat any symptoms or underlying health problems you have. Your provider will also work with you to control your risk factors. This will make liver damage less likely. In fact, treating those underlying conditions can often improve liver disease. You may need to take certain medicines, but no medicine will cure NAFLD. This is why treating the underlying conditions is most important. Your plan may include:  · Losing extra weight  · Getting regular exercise  · Controlling diabetes and high cholesterol or triglyceride levels  · Taking medicines and vitamins as prescribed by your provider  · Quitting smoking  · Not drinking alcohol  · Eating a healthy and balanced diet  Living with NAFLD  If NAFLD is caught early, it can be managed with  treatment. Your healthcare provider will discuss further treatment choices with you as needed.  Be sure to ask your provider about recommended vaccines. These include vaccines for viruses that can cause liver disease.  Date Last Reviewed: 12/1/2016  © 2716-8268 [employer]. [address] [place] [address], [place] [address]. All rights reserved. This information is not intended as a substitute for professional medical care. Always follow your healthcare professional's instructions.

## 2018-06-07 NOTE — PROGRESS NOTES
Subjective:      Patient ID: Isela Graf is a 47 y.o. female.    Chief Complaint: Fatty Liver    HPI:   Patient 47-year-old female presenting for GI follow-up.  She gives a history of chronic sporadic right upper quadrant pain for several years.  Prior evaluations by her history included upper endoscopy, colonoscopy, HIDA scan, ultrasound.  Ultrasound demonstrated fatty liver and hepatomegaly.  Prior ultrasound demonstrated a 7 mm renal lesion.  Other studies are not available at present.  Despite negative imaging studies, patient underwent cholecystectomy.  She has had symptomatic improvement.  She indicates that a liver biopsy was done at the time of her surgery at [facility]  We will request that result  Recent chemistries demonstrated mild elevation of ALT and AST as well as alkaline phosphatase.  We discussed fatty liver and alcohol intake.  Patient and her  are regular drinkers of alcohol    Past medical history includes melanoma of her scalp in 2015.  Prior appendectomy.  She has been treated with iron for mild anemia.  Ferritin level in the past was normal.  Denies NSAID use.  Family history negative for GI neoplasm.  Review of patient's allergies indicates:   Allergen Reactions    Sulfa (sulfonamide antibiotics) Other (See Comments)     Stomach pain     Past Medical History:   Diagnosis Date    Allergy     Anxiety     Depression     Hyperlipidemia     Melanoma 1/26/2015    scalp excised by Dr Aldridge    Melanoma of scalp 1/12/2015    Open wound of scalp, complicated 2/6/2015     Past Surgical History:   Procedure Laterality Date    APPENDECTOMY      EYE SURGERY      GALLBLADDER SURGERY  02/2017    Scalp Reconstruction  2/9/2015    yin-yang flaps    SKIN SURGERY      removal of cancer    TONSILLECTOMY      WLE scalp melanoma  1/26/2015     Family History   Problem Relation Age of Onset    Hyperlipidemia Mother     Heart attack Mother     Hyperlipidemia Father     Cancer  "Father     Heart disease Father     Cancer Maternal Grandmother         BCC    Breast cancer Paternal Grandmother 50    Heart attack Paternal Grandmother     Heart disease Paternal Grandmother     Melanoma Neg Hx     Colon cancer Neg Hx     Ovarian cancer Neg Hx      Social History     Social History    Marital status:      Spouse name: N/A    Number of children: N/A    Years of education: N/A     Occupational History    Not on file.     Social History Main Topics    Smoking status: Never Smoker    Smokeless tobacco: Never Used    Alcohol use Yes      Comment: occasionally    Drug use: No    Sexual activity: Yes     Partners: Male     Birth control/ protection: None     Other Topics Concern    Are You Pregnant Or Think You May Be? No     Social History Narrative    Works at a day care       Review of Systems:  Constitutional: Negative for appetite change.   HENT: Negative for trouble swallowing.   Eyes: Negative for photophobia.   Respiratory: Negative for cough and shortness of breath.   Cardiovascular: Negative for palpitations.   Gastrointestinal: See HPI for details.  Genitourinary: Negative for frequency and hematuria.   Skin: Negative for rash.   Neurological: Negative for weakness and headaches.   Hematological: Negative.   Psychiatric/Behavioral: Negative for suicidal ideas and behavioral problems.     Objective:     /86 (BP Location: Right arm, Patient Position: Sitting)   Pulse 81   Ht 5' 1" (1.549 m)   Wt 86.5 kg (190 lb 12.8 oz)   BMI 36.05 kg/m²     Physical Exam:  Alert no distress.  Obese  Eyes: Pupils are equal, round, and reactive to light.   Neck: Supple. No mass  Cardiovascular: Regular rhythm . No murmur   Pulmonary/Chest: Lungs clear   Abdominal: Soft.  Obese. Nontender, no guarding. Positive bowel sounds   Musculoskeletal: No deformity. No edema.   Psychiatric: Alert and oriented    Assessment:     1. LFTs abnormal    2. Fatty liver      Plan:     Isela was " seen today for fatty liver.    Diagnoses and all orders for this visit:    LFTs abnormal  -     Hepatitis panel, acute; Future  -     Iron and TIBC; Future  -     Ferritin; Future    Fatty liver  -     Hepatitis panel, acute; Future  -     Iron and TIBC; Future  -     Ferritin; Future        Plan:  Request liver biopsy result from [facility]  Encouraged weight reduction, and moderation with alcohol  Hepatitis screen, iron studies

## 2018-06-08 LAB
HAV IGM SERPL QL IA: NEGATIVE
HBV CORE IGM SERPL QL IA: NEGATIVE
HBV SURFACE AG SERPL QL IA: NEGATIVE
HCV AB SERPL QL IA: NEGATIVE

## 2018-06-15 ENCOUNTER — TELEPHONE (OUTPATIENT)
Dept: GASTROENTEROLOGY | Facility: CLINIC | Age: 48
End: 2018-06-15

## 2018-06-15 NOTE — TELEPHONE ENCOUNTER
----- Message from Chuck Rubio Jr., MD sent at 6/14/2018 11:22 PM CDT -----  Lab work negative for viral hepatitis. Notify patient.

## 2018-06-15 NOTE — TELEPHONE ENCOUNTER
A message was left on patient's voicemail to give the office a call back in regards to lab results.

## 2018-06-18 ENCOUNTER — TELEPHONE (OUTPATIENT)
Dept: GASTROENTEROLOGY | Facility: CLINIC | Age: 48
End: 2018-06-18

## 2018-06-18 NOTE — TELEPHONE ENCOUNTER
----- Message from Cory Weber sent at 6/15/2018 10:36 AM CDT -----  Contact: [phone]  Patient called in returning your call. Please advise.

## 2018-06-27 ENCOUNTER — TELEPHONE (OUTPATIENT)
Dept: FAMILY MEDICINE | Facility: CLINIC | Age: 48
End: 2018-06-27

## 2018-06-27 DIAGNOSIS — Z12.11 COLON CANCER SCREENING: Primary | ICD-10-CM

## 2018-06-29 ENCOUNTER — TELEPHONE (OUTPATIENT)
Dept: FAMILY MEDICINE | Facility: CLINIC | Age: 48
End: 2018-06-29

## 2018-06-29 RX ORDER — ROSUVASTATIN CALCIUM 10 MG/1
10 TABLET, COATED ORAL DAILY
Qty: 90 TABLET | Refills: 1 | Status: SHIPPED | OUTPATIENT
Start: 2018-06-29 | End: 2018-07-31 | Stop reason: SDUPTHER

## 2018-06-29 RX ORDER — HYDROGEN PEROXIDE 3 %
SOLUTION, NON-ORAL MISCELLANEOUS
Qty: 90 CAPSULE | Status: SHIPPED | OUTPATIENT
Start: 2018-06-29 | End: 2018-06-29 | Stop reason: SDUPTHER

## 2018-06-29 RX ORDER — CITALOPRAM 10 MG/1
TABLET ORAL
Qty: 90 TABLET | Refills: 1 | Status: SHIPPED | OUTPATIENT
Start: 2018-06-29 | End: 2019-01-12 | Stop reason: SDUPTHER

## 2018-06-29 RX ORDER — ROSUVASTATIN CALCIUM 10 MG/1
TABLET, COATED ORAL
Qty: 90 TABLET | Status: SHIPPED | OUTPATIENT
Start: 2018-06-29 | End: 2018-06-29 | Stop reason: SDUPTHER

## 2018-06-29 RX ORDER — HYDROGEN PEROXIDE 3 %
20 SOLUTION, NON-ORAL MISCELLANEOUS
Qty: 90 CAPSULE | Refills: 1 | Status: SHIPPED | OUTPATIENT
Start: 2018-06-29 | End: 2018-07-31 | Stop reason: SDUPTHER

## 2018-07-18 ENCOUNTER — TELEPHONE (OUTPATIENT)
Dept: GASTROENTEROLOGY | Facility: CLINIC | Age: 48
End: 2018-07-18

## 2018-07-18 NOTE — TELEPHONE ENCOUNTER
Referral was placed by 's office in regards to scheduling a Colonoscopy. Spoke with patient an she stated she will call back after her vacation.

## 2018-07-31 DIAGNOSIS — Z12.11 COLON CANCER SCREENING: Primary | ICD-10-CM

## 2018-07-31 NOTE — TELEPHONE ENCOUNTER
Please send to [facility]     ALPRAZolam (XANAX) 0.5 MG tablet  Take 1 tablet (0.5 mg total) by mouth 2 (two) times daily as needed for Anxiety.   Normal, Disp-30 tablet, R-0, DEVON          esomeprazole (NEXIUM) 20 MG capsule  Take 1 capsule (20 mg total) by mouth before breakfast.   Normal, Disp-90 capsule, R-1       rosuvastatin (CRESTOR) 10 MG tablet  Take 1 tablet (10 mg total) by mouth once daily.   Normal, Disp-90 tablet, R-1

## 2018-08-01 RX ORDER — ALPRAZOLAM 0.5 MG/1
0.5 TABLET ORAL 2 TIMES DAILY PRN
Qty: 30 TABLET | Refills: 0 | Status: SHIPPED | OUTPATIENT
Start: 2018-08-01 | End: 2021-01-11 | Stop reason: SDUPTHER

## 2018-08-01 RX ORDER — HYDROGEN PEROXIDE 3 %
20 SOLUTION, NON-ORAL MISCELLANEOUS
Qty: 90 CAPSULE | Refills: 1 | Status: SHIPPED | OUTPATIENT
Start: 2018-08-01 | End: 2019-01-12 | Stop reason: SDUPTHER

## 2018-08-01 RX ORDER — ROSUVASTATIN CALCIUM 10 MG/1
10 TABLET, COATED ORAL DAILY
Qty: 90 TABLET | Refills: 1 | Status: SHIPPED | OUTPATIENT
Start: 2018-08-01 | End: 2019-01-12 | Stop reason: SDUPTHER

## 2018-09-05 ENCOUNTER — TELEPHONE (OUTPATIENT)
Dept: GASTROENTEROLOGY | Facility: CLINIC | Age: 48
End: 2018-09-05

## 2018-09-05 NOTE — TELEPHONE ENCOUNTER
Attempted to contact patient about scheduling a Colonoscopy. Left message for patient to give office a call back.

## 2018-09-12 ENCOUNTER — TELEPHONE (OUTPATIENT)
Dept: GASTROENTEROLOGY | Facility: CLINIC | Age: 48
End: 2018-09-12

## 2018-09-12 NOTE — TELEPHONE ENCOUNTER
Spoke with patient about scheduling a Colonoscopy. Patient stated that she do not want to have her procedure done at [facility] because the Doctor that she wants does not work for [facility] anymore.

## 2018-09-13 ENCOUNTER — TELEPHONE (OUTPATIENT)
Dept: FAMILY MEDICINE | Facility: CLINIC | Age: 48
End: 2018-09-13

## 2018-09-13 ENCOUNTER — PATIENT MESSAGE (OUTPATIENT)
Dept: FAMILY MEDICINE | Facility: CLINIC | Age: 48
End: 2018-09-13

## 2018-09-13 DIAGNOSIS — Z12.11 COLON CANCER SCREENING: Primary | ICD-10-CM

## 2018-09-24 ENCOUNTER — PATIENT MESSAGE (OUTPATIENT)
Dept: FAMILY MEDICINE | Facility: CLINIC | Age: 48
End: 2018-09-24

## 2018-09-25 ENCOUNTER — TELEPHONE (OUTPATIENT)
Dept: FAMILY MEDICINE | Facility: CLINIC | Age: 48
End: 2018-09-25

## 2018-09-25 DIAGNOSIS — M79.671 RIGHT FOOT PAIN: Primary | ICD-10-CM

## 2018-09-26 ENCOUNTER — OFFICE VISIT (OUTPATIENT)
Dept: FAMILY MEDICINE | Facility: CLINIC | Age: 48
End: 2018-09-26
Payer: COMMERCIAL

## 2018-09-26 ENCOUNTER — HOSPITAL ENCOUNTER (OUTPATIENT)
Dept: RADIOLOGY | Facility: HOSPITAL | Age: 48
Discharge: HOME | End: 2018-09-26
Attending: FAMILY MEDICINE
Payer: COMMERCIAL

## 2018-09-26 VITALS
OXYGEN SATURATION: 98 % | HEART RATE: 76 BPM | WEIGHT: 188.81 LBS | TEMPERATURE: 98 F | BODY MASS INDEX: 35.65 KG/M2 | SYSTOLIC BLOOD PRESSURE: 130 MMHG | DIASTOLIC BLOOD PRESSURE: 84 MMHG | HEIGHT: 61 IN

## 2018-09-26 DIAGNOSIS — D50.0 IRON DEFICIENCY ANEMIA DUE TO CHRONIC BLOOD LOSS: ICD-10-CM

## 2018-09-26 DIAGNOSIS — Z12.11 COLON CANCER SCREENING: ICD-10-CM

## 2018-09-26 DIAGNOSIS — M79.671 RIGHT FOOT PAIN: ICD-10-CM

## 2018-09-26 DIAGNOSIS — S86.301A INJURY OF PERONEAL TENDON OF RIGHT FOOT, INITIAL ENCOUNTER: Primary | ICD-10-CM

## 2018-09-26 PROCEDURE — 73630 X-RAY EXAM OF FOOT: CPT | Mod: TC,FY,PO,RT

## 2018-09-26 PROCEDURE — 99213 OFFICE O/P EST LOW 20 MIN: CPT | Mod: S$GLB,,, | Performed by: FAMILY MEDICINE

## 2018-09-26 PROCEDURE — 3008F BODY MASS INDEX DOCD: CPT | Mod: CPTII,S$GLB,, | Performed by: FAMILY MEDICINE

## 2018-09-26 NOTE — PROGRESS NOTES
Subjective:      Patient ID: Isela Graf is a 48 y.o. female.    Chief Complaint: Follow-up (foot xray)      HPI right lateral foot pain for 2 months; was at [place] in July; left foot ok; xray normal      Review of Systems   Musculoskeletal: Positive for arthralgias and gait problem.        Right lateral foot     Objective:     Physical Exam   Constitutional: She appears well-developed and well-nourished.   HENT:   Head: Normocephalic.   Eyes: Pupils are equal, round, and reactive to light.   Neck: Normal range of motion.   Pulmonary/Chest: Effort normal.   Abdominal: She exhibits no distension.   Musculoskeletal: She exhibits tenderness. She exhibits no edema or deformity.   Right 5th metatarsal base tender at tendon insertion side and behind it   Skin: Skin is warm and dry. She is not diaphoretic.   Psychiatric: She has a normal mood and affect. Her behavior is normal. Judgment and thought content normal.   Nursing note and vitals reviewed.    Assessment:     1. Injury of peroneal tendon of right foot, initial encounter    2. Iron deficiency anemia due to chronic blood loss    3. Colon cancer screening      Plan:        Medication List           Accurate as of 9/26/18 11:59 PM. If you have any questions, ask your nurse or doctor.               CONTINUE taking these medications    albuterol 90 mcg/actuation inhaler  Commonly known as:  PROVENTIL/VENTOLIN HFA  Inhale 2 puffs into the lungs every 6 (six) hours as needed for Wheezing.     ALPRAZolam 0.5 MG tablet  Commonly known as:  XANAX  Take 1 tablet (0.5 mg total) by mouth 2 (two) times daily as needed for Anxiety.     aspirin 81 MG EC tablet  Commonly known as:  ECOTRIN     CENTRUM 3,500-18-0.4 unit-mg-mg Chew  Generic drug:  multivit-iron-min-folic acid     citalopram 10 MG tablet  Commonly known as:  CELEXA  TAKE 1 TABLET EVERY EVENING     esomeprazole 20 MG capsule  Commonly known as:  NEXIUM  Take 1 capsule (20 mg total) by mouth before breakfast.      fexofenadine 180 MG tablet  Commonly known as:  ALLEGRA     fish oil-omega-3 fatty acids 300-1,000 mg capsule     fluticasone 50 mcg/actuation nasal spray  Commonly known as:  FLONASE  2 sprays (100 mcg total) by Each Nare route daily as needed.     HYDROcodone-acetaminophen 7.5-325 mg per tablet  Commonly known as:  NORCO     rosuvastatin 10 MG tablet  Commonly known as:  CRESTOR  Take 1 tablet (10 mg total) by mouth once daily.        STOP taking these medications    gabapentin 300 MG capsule  Commonly known as:  NEURONTIN  Stopped by:  Carlos Santiago MD          Injury of peroneal tendon of right foot, initial encounter    Iron deficiency anemia due to chronic blood loss  -     Ambulatory referral to Gastroenterology    Colon cancer screening  -     Ambulatory referral to Gastroenterology      Ice, shoes, aleve, rest; MRI if persists. Xray normal

## 2018-10-25 ENCOUNTER — PATIENT MESSAGE (OUTPATIENT)
Dept: FAMILY MEDICINE | Facility: CLINIC | Age: 48
End: 2018-10-25

## 2018-10-31 ENCOUNTER — CLINICAL SUPPORT (OUTPATIENT)
Dept: FAMILY MEDICINE | Facility: CLINIC | Age: 48
End: 2018-10-31
Payer: COMMERCIAL

## 2018-10-31 PROCEDURE — 90686 IIV4 VACC NO PRSV 0.5 ML IM: CPT | Mod: S$GLB,,, | Performed by: FAMILY MEDICINE

## 2018-10-31 PROCEDURE — 90471 IMMUNIZATION ADMIN: CPT | Mod: S$GLB,,, | Performed by: FAMILY MEDICINE

## 2018-10-31 PROCEDURE — 90471 FLU VACCINE (QUAD) GREATER THAN OR EQUAL TO 3YO PRESERVATIVE FREE IM: ICD-10-PCS | Mod: S$GLB,,, | Performed by: FAMILY MEDICINE

## 2018-10-31 PROCEDURE — 90686 FLU VACCINE (QUAD) GREATER THAN OR EQUAL TO 3YO PRESERVATIVE FREE IM: ICD-10-PCS | Mod: S$GLB,,, | Performed by: FAMILY MEDICINE

## 2018-11-09 ENCOUNTER — TELEPHONE (OUTPATIENT)
Dept: FAMILY MEDICINE | Facility: CLINIC | Age: 48
End: 2018-11-09

## 2018-11-09 ENCOUNTER — CLINICAL SUPPORT (OUTPATIENT)
Dept: FAMILY MEDICINE | Facility: CLINIC | Age: 48
End: 2018-11-09
Payer: COMMERCIAL

## 2018-11-09 DIAGNOSIS — N76.0 ACUTE VAGINITIS: Primary | ICD-10-CM

## 2018-11-09 DIAGNOSIS — M54.50 LOW BACK PAIN WITHOUT SCIATICA, UNSPECIFIED BACK PAIN LATERALITY, UNSPECIFIED CHRONICITY: Primary | ICD-10-CM

## 2018-11-09 LAB
BILIRUB SERPL-MCNC: NORMAL MG/DL
BLOOD URINE, POC: NORMAL
COLOR, POC UA: YELLOW
GLUCOSE UR QL STRIP: NORMAL
KETONES UR QL STRIP: NORMAL
LEUKOCYTE ESTERASE URINE, POC: NORMAL
NITRITE, POC UA: NORMAL
PH, POC UA: 5
PROTEIN, POC: NORMAL
SPECIFIC GRAVITY, POC UA: 1
UROBILINOGEN, POC UA: NORMAL

## 2018-11-09 PROCEDURE — 81002 URINALYSIS NONAUTO W/O SCOPE: CPT | Mod: S$GLB,,, | Performed by: FAMILY MEDICINE

## 2018-11-09 RX ORDER — FLUCONAZOLE 150 MG/1
150 TABLET ORAL DAILY
Qty: 2 TABLET | Refills: 0 | Status: SHIPPED | OUTPATIENT
Start: 2018-11-09 | End: 2018-11-11

## 2018-11-09 NOTE — TELEPHONE ENCOUNTER
Patient's  will drop off urine sample; Patient experiencing pressure during urination. Patient allergic to sulfa. [facility]         ----- Message from Aparna Sims sent at 11/9/2018  1:11 PM CST -----  Contact: [phone]/self  Patient would like to be seen today for a bladder infection. She states her  can bring in a urine sample for her if there aren't any appts available.

## 2018-11-09 NOTE — TELEPHONE ENCOUNTER
Sometimes a vaginits can cause similar symptoms.  I will send in some fluconazole and let's see if that helps.

## 2018-11-09 NOTE — PROGRESS NOTES
Pt  dropped off urine  poct urine completed and message sent to dr villasenor/dr woods to review

## 2018-11-09 NOTE — TELEPHONE ENCOUNTER
I almost forgot!  Fluconazole can interact with her celexa, so please have her skip her celexa for 2 days while taking the fluconazole.

## 2018-11-09 NOTE — TELEPHONE ENCOUNTER
Dr Vazquez,  Dr woods's patient c/o lower abd pain and lower back pain  She thought she had a UTI  Urine was clear in color and no infection nor blood  Do you have any other suggestions?  Danya

## 2018-11-09 NOTE — TELEPHONE ENCOUNTER
Patient advised prescription sent to pharmacy; also advised to stop taking Celexa while taking the new medication

## 2018-12-18 ENCOUNTER — TELEPHONE (OUTPATIENT)
Dept: OBSTETRICS AND GYNECOLOGY | Facility: CLINIC | Age: 48
End: 2018-12-18

## 2018-12-18 DIAGNOSIS — Z12.31 ENCOUNTER FOR SCREENING MAMMOGRAM FOR BREAST CANCER: Primary | ICD-10-CM

## 2018-12-18 NOTE — TELEPHONE ENCOUNTER
----- Message from Elizabeth Ivory sent at 12/18/2018 10:16 AM CST -----  Contact: Pt   Name of Who is Calling: WAI SANCHEZ [[id number]]      What is the request in detail: Patient is requesting a mmg order, would like to schedule on the same day of her appt. Please contact to further discuss and advise      Can the clinic reply by [facility]: No       [facility]: [phone]

## 2018-12-31 ENCOUNTER — TELEPHONE (OUTPATIENT)
Dept: FAMILY MEDICINE | Facility: CLINIC | Age: 48
End: 2018-12-31

## 2018-12-31 ENCOUNTER — OFFICE VISIT (OUTPATIENT)
Dept: FAMILY MEDICINE | Facility: CLINIC | Age: 48
End: 2018-12-31
Payer: COMMERCIAL

## 2018-12-31 VITALS
OXYGEN SATURATION: 95 % | SYSTOLIC BLOOD PRESSURE: 112 MMHG | DIASTOLIC BLOOD PRESSURE: 72 MMHG | BODY MASS INDEX: 36.27 KG/M2 | WEIGHT: 192.13 LBS | HEIGHT: 61 IN | HEART RATE: 89 BPM | TEMPERATURE: 98 F

## 2018-12-31 DIAGNOSIS — C43.4 MELANOMA OF SCALP: ICD-10-CM

## 2018-12-31 DIAGNOSIS — J45.20 MILD INTERMITTENT EXTRINSIC ASTHMA WITHOUT COMPLICATION: Primary | ICD-10-CM

## 2018-12-31 DIAGNOSIS — J30.2 SEASONAL ALLERGIES: ICD-10-CM

## 2018-12-31 PROCEDURE — 99213 OFFICE O/P EST LOW 20 MIN: CPT | Mod: S$GLB,,, | Performed by: FAMILY MEDICINE

## 2018-12-31 PROCEDURE — 3008F BODY MASS INDEX DOCD: CPT | Mod: CPTII,S$GLB,, | Performed by: FAMILY MEDICINE

## 2018-12-31 RX ORDER — ALBUTEROL SULFATE 90 UG/1
2 AEROSOL, METERED RESPIRATORY (INHALATION) EVERY 6 HOURS PRN
Qty: 18 G | Refills: 5 | Status: SHIPPED | OUTPATIENT
Start: 2018-12-31 | End: 2019-08-21 | Stop reason: SDUPTHER

## 2018-12-31 NOTE — TELEPHONE ENCOUNTER
----- Message from Francisca Rivera sent at 12/31/2018 10:23 AM CST -----  Contact: Self [phone]  Patient is requesting to be seen today due to a chest cold and severe cough.

## 2018-12-31 NOTE — PROGRESS NOTES
Subjective:      Patient ID: Isela Graf is a 48 y.o. female.    Chief Complaint: URI      HPI   Wheezing, tightness and upper back pain right by scapular; for 2 days; no fever; coughs, phlegm; took mucines, allegra and flonase; going out of town; non smoker    Review of Systems   Constitutional: Negative.    HENT: Negative.    Respiratory: Positive for chest tightness and wheezing.    Cardiovascular: Negative.    Gastrointestinal: Negative.    Endocrine: Negative.    Genitourinary: Negative.    Musculoskeletal: Negative.    Psychiatric/Behavioral: Negative.    All other systems reviewed and are negative.    Objective:     Physical Exam   Constitutional: She is oriented to person, place, and time. She appears well-developed and well-nourished. No distress.   HENT:   Head: Normocephalic and atraumatic.   Mouth/Throat: Oropharynx is clear and moist.   Eyes: Conjunctivae and EOM are normal. Pupils are equal, round, and reactive to light. Right eye exhibits no discharge. Left eye exhibits no discharge. No scleral icterus.   Neck: Normal range of motion. Neck supple. No JVD present. No thyromegaly present.   Cardiovascular: Normal rate, regular rhythm, normal heart sounds and intact distal pulses.   No murmur heard.  Pulmonary/Chest: Effort normal and breath sounds normal. No stridor. No respiratory distress. She has no wheezes. She has no rales. She exhibits no tenderness.   Abdominal: Soft. She exhibits no distension and no mass. There is no tenderness. There is no rebound and no guarding.   Musculoskeletal: Normal range of motion. She exhibits no edema or tenderness.   Lymphadenopathy:     She has no cervical adenopathy.   Neurological: She is alert and oriented to person, place, and time.   Skin: Skin is warm and dry. No rash noted. She is not diaphoretic. No erythema. No pallor.   Psychiatric: She has a normal mood and affect. Judgment and thought content normal.   Anxious, as always   Nursing note and vitals  reviewed.    Assessment:     1. Mild intermittent extrinsic asthma without complication    2. Seasonal allergies    3. [id number] melanoma of scalp      Plan:        Medication List           Accurate as of 12/31/18 11:54 AM. If you have any questions, ask your nurse or doctor.               CONTINUE taking these medications    albuterol 90 mcg/actuation inhaler  Commonly known as:  PROVENTIL/VENTOLIN HFA  Inhale 2 puffs into the lungs every 6 (six) hours as needed for Wheezing.     ALPRAZolam 0.5 MG tablet  Commonly known as:  XANAX  Take 1 tablet (0.5 mg total) by mouth 2 (two) times daily as needed for Anxiety.     aspirin 81 MG EC tablet  Commonly known as:  ECOTRIN     CENTRUM 3,500-18-0.4 unit-mg-mg Chew  Generic drug:  multivit-iron-min-folic acid     citalopram 10 MG tablet  Commonly known as:  CELEXA  TAKE 1 TABLET EVERY EVENING     esomeprazole 20 MG capsule  Commonly known as:  NEXIUM  Take 1 capsule (20 mg total) by mouth before breakfast.     fexofenadine 180 MG tablet  Commonly known as:  ALLEGRA     fish oil-omega-3 fatty acids 300-1,000 mg capsule     fluticasone 50 mcg/actuation nasal spray  Commonly known as:  FLONASE  2 sprays (100 mcg total) by Each Nare route daily as needed.     HYDROcodone-acetaminophen 7.5-325 mg per tablet  Commonly known as:  NORCO     MUCINEX D ORAL     psyllium powder  Commonly known as:  METAMUCIL     rosuvastatin 10 MG tablet  Commonly known as:  CRESTOR  Take 1 tablet (10 mg total) by mouth once daily.           Where to Get Your Medications      These medications were sent to [id number] [place]  [address] [address]    Phone:  [phone]   · albuterol 90 mcg/actuation inhaler       Mild intermittent extrinsic asthma without complication    Seasonal allergies    [id number] melanoma of scalp    Other orders  -     albuterol (PROVENTIL/VENTOLIN HFA) 90 mcg/actuation inhaler; Inhale 2 puffs into the lungs every 6  (six) hours as needed for Wheezing.  Dispense: 18 g; Refill: 5    albuterol MDI 2 qid prn wheezing, cough, tightness  imm up to date  Pneumonia 23 in March 2019

## 2019-01-11 ENCOUNTER — PATIENT MESSAGE (OUTPATIENT)
Dept: OBSTETRICS AND GYNECOLOGY | Facility: CLINIC | Age: 49
End: 2019-01-11

## 2019-01-11 ENCOUNTER — HOSPITAL ENCOUNTER (OUTPATIENT)
Dept: RADIOLOGY | Facility: OTHER | Age: 49
Discharge: HOME | End: 2019-01-11
Attending: OBSTETRICS & GYNECOLOGY
Payer: COMMERCIAL

## 2019-01-11 VITALS — HEIGHT: 61 IN | WEIGHT: 192 LBS | BODY MASS INDEX: 36.25 KG/M2

## 2019-01-11 DIAGNOSIS — Z12.31 ENCOUNTER FOR SCREENING MAMMOGRAM FOR BREAST CANCER: ICD-10-CM

## 2019-01-11 PROCEDURE — 77067 SCR MAMMO BI INCL CAD: CPT | Mod: 26,,, | Performed by: INTERNAL MEDICINE

## 2019-01-11 PROCEDURE — 77067 SCR MAMMO BI INCL CAD: CPT | Mod: TC

## 2019-01-11 PROCEDURE — 77063 MAMMO DIGITAL SCREENING BILAT WITH TOMOSYNTHESIS_CAD: ICD-10-PCS | Mod: 26,,, | Performed by: INTERNAL MEDICINE

## 2019-01-11 PROCEDURE — 77067 MAMMO DIGITAL SCREENING BILAT WITH TOMOSYNTHESIS_CAD: ICD-10-PCS | Mod: 26,,, | Performed by: INTERNAL MEDICINE

## 2019-01-11 PROCEDURE — 77063 BREAST TOMOSYNTHESIS BI: CPT | Mod: 26,,, | Performed by: INTERNAL MEDICINE

## 2019-01-13 RX ORDER — ROSUVASTATIN CALCIUM 10 MG/1
TABLET, COATED ORAL
Qty: 90 TABLET | Refills: 1 | Status: SHIPPED | OUTPATIENT
Start: 2019-01-13 | End: 2019-07-01 | Stop reason: SDUPTHER

## 2019-01-13 RX ORDER — HYDROGEN PEROXIDE 3 %
SOLUTION, NON-ORAL MISCELLANEOUS
Qty: 90 CAPSULE | Refills: 1 | Status: SHIPPED | OUTPATIENT
Start: 2019-01-13 | End: 2019-07-01 | Stop reason: SDUPTHER

## 2019-01-13 RX ORDER — CITALOPRAM 10 MG/1
TABLET ORAL
Qty: 90 TABLET | Refills: 1 | Status: SHIPPED | OUTPATIENT
Start: 2019-01-13 | End: 2019-07-01 | Stop reason: SDUPTHER

## 2019-01-25 ENCOUNTER — OFFICE VISIT (OUTPATIENT)
Dept: OBSTETRICS AND GYNECOLOGY | Facility: CLINIC | Age: 49
End: 2019-01-25
Attending: OBSTETRICS & GYNECOLOGY
Payer: COMMERCIAL

## 2019-01-25 VITALS
SYSTOLIC BLOOD PRESSURE: 130 MMHG | WEIGHT: 194.25 LBS | BODY MASS INDEX: 35.75 KG/M2 | HEIGHT: 62 IN | DIASTOLIC BLOOD PRESSURE: 84 MMHG

## 2019-01-25 DIAGNOSIS — Z01.419 WELL WOMAN EXAM WITH ROUTINE GYNECOLOGICAL EXAM: Primary | ICD-10-CM

## 2019-01-25 DIAGNOSIS — Z12.4 PAP SMEAR FOR CERVICAL CANCER SCREENING: ICD-10-CM

## 2019-01-25 DIAGNOSIS — N95.1 PERIMENOPAUSAL: ICD-10-CM

## 2019-01-25 PROCEDURE — 99396 PR PREVENTIVE VISIT,EST,40-64: ICD-10-PCS | Mod: S$GLB,,, | Performed by: OBSTETRICS & GYNECOLOGY

## 2019-01-25 PROCEDURE — 88175 CYTOPATH C/V AUTO FLUID REDO: CPT

## 2019-01-25 PROCEDURE — 99999 PR PBB SHADOW E&M-EST. PATIENT-LVL III: ICD-10-PCS | Mod: PBBFAC,,, | Performed by: OBSTETRICS & GYNECOLOGY

## 2019-01-25 PROCEDURE — 99999 PR PBB SHADOW E&M-EST. PATIENT-LVL III: CPT | Mod: PBBFAC,,, | Performed by: OBSTETRICS & GYNECOLOGY

## 2019-01-25 PROCEDURE — 99396 PREV VISIT EST AGE 40-64: CPT | Mod: S$GLB,,, | Performed by: OBSTETRICS & GYNECOLOGY

## 2019-01-25 NOTE — PROGRESS NOTES
Isela Graf is a 48 y.o. female  who presents for annual exam.  She is perimenopausal with periods spacing out to every 6-8 months.  Her last period was 10/12/18.  Reports only mild hot flashes.  S/P removal of gall bladder 2018.  Recent mammogram was negative.  Requests pap today.  Patient's last menstrual period was 10/12/2018 (approximate).     Mammogram 19: Negative    Past Medical History:   Diagnosis Date    Allergy     Anxiety     Depression     Hyperlipidemia     Melanoma 2015    scalp excised by Dr Aldridge    Melanoma of scalp 2015    Open wound of scalp, complicated 2015             OB History      Para Term  AB Living    8 2 2   4 2    SAB TAB Ectopic Multiple Live Births    3                  ROS:  GENERAL: Reports weight gain.   SKIN: Denies rash or lesions.   HEAD: Denies head injury or headache.   NODES: Denies enlarged lymph nodes.   CHEST: Denies chest pain or shortness of breath.   CARDIOVASCULAR: Denies palpitations or left sided chest pain.   ABDOMEN: No abdominal pain, nausea, vomiting or rectal bleeding.   URINARY: No dysuria or hematuria.  REPRODUCTIVE: See HPI.   BREASTS: Denies pain, lumps, or nipple discharge.   HEMATOLOGIC: No easy bruisability or excessive bleeding.   MUSCULOSKELETAL: Denies joint pain or swelling.   NEUROLOGIC: Denies syncope or weakness.   PSYCHIATRIC: Denies depression.    PE:   (chaperone present during entire exam)  APPEARANCE: Well nourished, well developed, in no acute distress.  BREASTS: Symmetrical, no skin changes or visible lesions. No palpable masses, nipple discharge or adenopathy bilaterally.  ABDOMEN: Soft. No tenderness or masses. No hernias. No CVA tenderness.  VULVA: No lesions. Normal female genitalia.  URETHRAL MEATUS: Normal size and location, no lesions, no prolapse.  URETHRA: No masses, tenderness, prolapse or scarring.  VAGINA: No lesions, no discharge, no significant cystocele or  rectocele.  CERVIX: No lesions and discharge. PAP done.  UTERUS: Normal size, regular shape, mobile, non-tender, bladder base nontender.  ADNEXA: No masses, tenderness or CDS nodularity.  ANUS PERINEUM: Normal.      Diagnosis:  1. Well woman exam with routine gynecological exam    2. Perimenopausal    3. Pap smear for cervical cancer screening          PLAN:    Orders Placed This Encounter    Liquid-based pap smear, screening       Patient was counseled today on perimenopausal issues and expected bleeding patterns.    Follow-up in 1 year.

## 2019-02-03 ENCOUNTER — PATIENT MESSAGE (OUTPATIENT)
Dept: OBSTETRICS AND GYNECOLOGY | Facility: CLINIC | Age: 49
End: 2019-02-03

## 2019-02-25 ENCOUNTER — OFFICE VISIT (OUTPATIENT)
Dept: FAMILY MEDICINE | Facility: CLINIC | Age: 49
End: 2019-02-25
Payer: COMMERCIAL

## 2019-02-25 VITALS
HEART RATE: 88 BPM | DIASTOLIC BLOOD PRESSURE: 90 MMHG | HEIGHT: 62 IN | TEMPERATURE: 98 F | BODY MASS INDEX: 35.64 KG/M2 | SYSTOLIC BLOOD PRESSURE: 130 MMHG | OXYGEN SATURATION: 97 % | WEIGHT: 193.69 LBS

## 2019-02-25 DIAGNOSIS — J06.9 VIRAL UPPER RESPIRATORY TRACT INFECTION WITH COUGH: Primary | ICD-10-CM

## 2019-02-25 LAB
CTP QC/QA: YES
FLUAV AG NPH QL: NEGATIVE
FLUBV AG NPH QL: NEGATIVE

## 2019-02-25 PROCEDURE — 3008F BODY MASS INDEX DOCD: CPT | Mod: CPTII,S$GLB,, | Performed by: NURSE PRACTITIONER

## 2019-02-25 PROCEDURE — 99213 OFFICE O/P EST LOW 20 MIN: CPT | Mod: 25,S$GLB,, | Performed by: NURSE PRACTITIONER

## 2019-02-25 PROCEDURE — 96372 PR INJECTION,THERAP/PROPH/DIAG2ST, IM OR SUBCUT: ICD-10-PCS | Mod: S$GLB,,, | Performed by: NURSE PRACTITIONER

## 2019-02-25 PROCEDURE — 96372 THER/PROPH/DIAG INJ SC/IM: CPT | Mod: S$GLB,,, | Performed by: NURSE PRACTITIONER

## 2019-02-25 PROCEDURE — 3008F PR BODY MASS INDEX (BMI) DOCUMENTED: ICD-10-PCS | Mod: CPTII,S$GLB,, | Performed by: NURSE PRACTITIONER

## 2019-02-25 PROCEDURE — 87804 POCT INFLUENZA A/B: ICD-10-PCS | Mod: 59,QW,, | Performed by: NURSE PRACTITIONER

## 2019-02-25 PROCEDURE — 87804 INFLUENZA ASSAY W/OPTIC: CPT | Mod: QW,,, | Performed by: NURSE PRACTITIONER

## 2019-02-25 PROCEDURE — 99213 PR OFFICE/OUTPT VISIT, EST, LEVL III, 20-29 MIN: ICD-10-PCS | Mod: 25,S$GLB,, | Performed by: NURSE PRACTITIONER

## 2019-02-25 RX ORDER — TRIAMCINOLONE ACETONIDE 40 MG/ML
40 INJECTION, SUSPENSION INTRA-ARTICULAR; INTRAMUSCULAR
Status: COMPLETED | OUTPATIENT
Start: 2019-02-25 | End: 2019-02-25

## 2019-02-25 RX ORDER — ALBUTEROL SULFATE 90 UG/1
2 AEROSOL, METERED RESPIRATORY (INHALATION) EVERY 6 HOURS PRN
Qty: 18 G | Refills: 0 | Status: SHIPPED | OUTPATIENT
Start: 2019-02-25 | End: 2020-02-25

## 2019-02-25 RX ADMIN — TRIAMCINOLONE ACETONIDE 40 MG: 40 INJECTION, SUSPENSION INTRA-ARTICULAR; INTRAMUSCULAR at 02:02

## 2019-02-25 NOTE — LETTER
February 25, 2019      [address] [place] [address]  Phone: [phone]  Fax: [phone]       Patient: Isela Graf   YOB: 1970  Date of Visit: 02/25/2019    To Whom It May Concern:    Marielos Graf  was at [facility] on 02/25/2019. She may return to work/school on 02/27/2019 with no restrictions. If you have any questions or concerns, or if I can be of further assistance, please do not hesitate to contact me.    Sincerely,    ISIDRO Hale

## 2019-02-25 NOTE — PATIENT INSTRUCTIONS
Viral Upper Respiratory Illness (Adult)  You have a viral upper respiratory illness (URI), which is another term for the common cold. This illness is contagious during the first few days. It is spread through the air by coughing and sneezing. It may also be spread by direct contact (touching the sick person and then touching your own eyes, nose, or mouth). Frequent handwashing will decrease risk of spread. Most viral illnesses go away within 7 to 10 days with rest and simple home remedies. Sometimes the illness may last for several weeks. Antibiotics will not kill a virus, and they are generally not prescribed for this condition.    Home care  · If symptoms are severe, rest at home for the first 2 to 3 days. When you resume activity, don't let yourself get too tired.  · Avoid being exposed to cigarette smoke (yours or others).  · You may use acetaminophen or ibuprofen to control pain and fever, unless another medicine was prescribed. (Note: If you have chronic liver or kidney disease, have ever had a stomach ulcer or gastrointestinal bleeding, or are taking blood-thinning medicines, talk with your healthcare provider before using these medicines.) Aspirin should never be given to anyone under 18 years of age who is ill with a viral infection or fever. It may cause severe liver or brain damage.  · Your appetite may be poor, so a light diet is fine. Avoid dehydration by drinking 6 to 8 glasses of fluids per day (water, soft drinks, juices, tea, or soup). Extra fluids will help loosen secretions in the nose and lungs.  · Over-the-counter cold medicines will not shorten the length of time youre sick, but they may be helpful for the following symptoms: cough, sore throat, and nasal and sinus congestion. (Note: Do not use decongestants if you have high blood pressure.)  Follow-up care  Follow up with your healthcare provider, or as advised.  When to seek medical advice  Call your healthcare provider right away if any  of these occur:  · Cough with lots of colored sputum (mucus)  · Severe headache; face, neck, or ear pain  · Difficulty swallowing due to throat pain  · Fever of 100.4°F (38°C)  Call 911, or get immediate medical care  Call emergency services right away if any of these occur:  · Chest pain, shortness of breath, wheezing, or difficulty breathing  · Coughing up blood  · Inability to swallow due to throat pain  Date Last Reviewed: 9/13/2015  © 1907-7621 [employer]. [address] [place] [id number]. All rights reserved. This information is not intended as a substitute for professional medical care. Always follow your healthcare professional's instructions.

## 2019-02-25 NOTE — PROGRESS NOTES
Subjective:       Patient ID: Isela Graf is a 48 y.o. female.    Chief Complaint: Cough (congestion, body aches ) and Sore Throat    49 y/o female with history of chronic allergies presents to clinic for urgent care visit with c/o congestion, sore throat, and body aches for 2 days.      URI    This is a new problem. The current episode started in the past 7 days. The problem has been gradually worsening. There has been no fever. Associated symptoms include congestion, coughing, rhinorrhea and a sore throat. Pertinent negatives include no chest pain, ear pain, headaches, nausea, plugged ear sensation, sinus pain, sneezing, vomiting or wheezing. Associated symptoms comments: +body aches. She has tried acetaminophen and inhaler use for the symptoms. The treatment provided mild relief.       Current Outpatient Medications   Medication Sig Dispense Refill    albuterol (PROVENTIL/VENTOLIN HFA) 90 mcg/actuation inhaler Inhale 2 puffs into the lungs every 6 (six) hours as needed for Wheezing. 18 g 5    ALPRAZolam (XANAX) 0.5 MG tablet Take 1 tablet (0.5 mg total) by mouth 2 (two) times daily as needed for Anxiety. 30 tablet 0    aspirin (ECOTRIN) 81 MG EC tablet Take 81 mg by mouth once daily.      citalopram (CELEXA) 10 MG tablet TAKE 1 TABLET EVERY EVENING 90 tablet 1    esomeprazole (NEXIUM) 20 MG capsule TAKE 1 CAPSULE BEFORE BREAKFAST 90 capsule 1    fexofenadine (ALLEGRA) 180 MG tablet Take 180 mg by mouth as needed.       fish oil-omega-3 fatty acids 300-1,000 mg capsule Take 2 g by mouth after lunch.       fluticasone (FLONASE) 50 mcg/actuation nasal spray 2 sprays (100 mcg total) by Each Nare route daily as needed. 1 Bottle 2    guaifenesin/pseudoephedrne HCl (MUCINEX D ORAL) Take by mouth.      hydrocodone-acetaminophen 7.5-325mg (NORCO) 7.5-325 mg per tablet       MULTIVIT-IRON-MIN-FOLIC ACID 3,500-18-0.4 UNIT-MG-MG ORAL CHEW Take 1 tablet by mouth after lunch.       psyllium (METAMUCIL) powder  Take 1 packet by mouth once daily.      rosuvastatin (CRESTOR) 10 MG tablet TAKE 1 TABLET DAILY 90 tablet 1    albuterol (VENTOLIN HFA) 90 mcg/actuation inhaler Inhale 2 puffs into the lungs every 6 (six) hours as needed for Wheezing. Rescue 18 g 0     No current facility-administered medications for this visit.        Past Medical History:   Diagnosis Date    Allergy     Anxiety     Depression     Hyperlipidemia     Melanoma 1/26/2015    scalp excised by Dr Aldridge    Melanoma of scalp 1/12/2015    Open wound of scalp, complicated 2/6/2015       Past Surgical History:   Procedure Laterality Date    APPENDECTOMY      EXCISION-LESION-FACE N/A 1/26/2015    Performed [facility] Wilmer [facility] at [facility] [address] [place] [address]    EYE SURGERY      GALLBLADDER SURGERY  02/2017    REPAIR-MOHS DEFECT(Prone position)(90 mins)(scalp) Bilateral 2/9/2015    Performed by Rolo Cowan MD at [facility] OR [address] [place] [address]    Scalp Reconstruction      yin-yang flaps    SKIN SURGERY      removal of cancer    TONSILLECTOMY      WLE scalp melanoma  1/26/2015       Family History   Problem Relation Age of Onset    Hyperlipidemia Mother     Heart attack Mother     Hyperlipidemia Father     Cancer Father     Heart disease Father     Cancer Maternal Grandmother         BCC    Breast cancer Paternal Grandmother 50    Heart attack Paternal Grandmother     Heart disease Paternal Grandmother     Melanoma Neg Hx     Colon cancer Neg Hx     Ovarian cancer Neg Hx        Social History     Socioeconomic History    Marital status:      Spouse name: None    Number of children: None    Years of education: None    Highest education level: None   Social Needs    Financial resource strain: None    Food insecurity - worry: None    Food insecurity - inability: None    Transportation needs - medical: None    Transportation needs - non-medical: None   Occupational History    None   Tobacco Use    Smoking status: Never Smoker     "Smokeless tobacco: Never Used   Substance and Sexual Activity    Alcohol use: Yes     Comment: occasionally    Drug use: No    Sexual activity: Yes     Partners: Male     Birth control/protection: None   Other Topics Concern    Are you pregnant or think you may be? No    Breast-feeding Not Asked   Social History Narrative    Works at a day care       Review of Systems   Constitutional: Negative for chills, fatigue and fever.   HENT: Positive for congestion, postnasal drip, rhinorrhea and sore throat. Negative for ear pain, nosebleeds, sinus pain and sneezing.    Respiratory: Positive for cough. Negative for wheezing.    Cardiovascular: Negative for chest pain.   Gastrointestinal: Negative for nausea and vomiting.   Musculoskeletal: Positive for myalgias.   Neurological: Negative for headaches.         Objective:     Vitals:    02/25/19 1411   BP: (!) 130/90   BP Location: Right arm   Pulse: 88   Temp: 98 °F (36.7 °C)   TempSrc: Oral   SpO2: 97%   Weight: 87.9 kg (193 lb 10.8 oz)   Height: 5' 1.5" (1.562 m)          Physical Exam   Constitutional: She is oriented to person, place, and time. She appears well-developed and well-nourished. No distress.   HENT:   Head: Normocephalic.   Right Ear: Hearing and tympanic membrane normal.   Left Ear: Hearing and tympanic membrane normal.   Nose: Mucosal edema and rhinorrhea present. Right sinus exhibits no maxillary sinus tenderness and no frontal sinus tenderness. Left sinus exhibits no maxillary sinus tenderness and no frontal sinus tenderness.   Mouth/Throat: Uvula is midline and mucous membranes are normal. Posterior oropharyngeal erythema present. No oropharyngeal exudate or posterior oropharyngeal edema.   Eyes: Conjunctivae are normal. Pupils are equal, round, and reactive to light.   Neck: Normal range of motion. Neck supple.   Cardiovascular: Normal rate and regular rhythm.   Pulmonary/Chest: Breath sounds normal. No respiratory distress.   Musculoskeletal: " Normal range of motion.   Lymphadenopathy:     She has no cervical adenopathy.   Neurological: She is alert and oriented to person, place, and time.   Skin: Skin is warm and dry.   Psychiatric: She has a normal mood and affect.         Assessment:         ICD-10-CM ICD-9-CM   1. Viral upper respiratory tract infection with cough J06.9 465.9    B97.89        Plan:       Viral upper respiratory tract infection with cough  -     POCT Influenza A/B: negative  -     albuterol (VENTOLIN HFA) 90 mcg/actuation inhaler; Inhale 2 puffs into the lungs every 6 (six) hours as needed for Wheezing. Rescue  Dispense: 18 g; Refill: 0  -     triamcinolone acetonide injection 40 mg      Follow-up for If symptoms worsen or fail to improve.     Patient's Medications   New Prescriptions    ALBUTEROL (VENTOLIN HFA) 90 MCG/ACTUATION INHALER    Inhale 2 puffs into the lungs every 6 (six) hours as needed for Wheezing. Rescue   Previous Medications    ALBUTEROL (PROVENTIL/VENTOLIN HFA) 90 MCG/ACTUATION INHALER    Inhale 2 puffs into the lungs every 6 (six) hours as needed for Wheezing.    ALPRAZOLAM (XANAX) 0.5 MG TABLET    Take 1 tablet (0.5 mg total) by mouth 2 (two) times daily as needed for Anxiety.    ASPIRIN (ECOTRIN) 81 MG EC TABLET    Take 81 mg by mouth once daily.    CITALOPRAM (CELEXA) 10 MG TABLET    TAKE 1 TABLET EVERY EVENING    ESOMEPRAZOLE (NEXIUM) 20 MG CAPSULE    TAKE 1 CAPSULE BEFORE BREAKFAST    FEXOFENADINE (ALLEGRA) 180 MG TABLET    Take 180 mg by mouth as needed.     FISH OIL-OMEGA-3 FATTY ACIDS 300-1,000 MG CAPSULE    Take 2 g by mouth after lunch.     FLUTICASONE (FLONASE) 50 MCG/ACTUATION NASAL SPRAY    2 sprays (100 mcg total) by Each Nare route daily as needed.    GUAIFENESIN/PSEUDOEPHEDRNE HCL (MUCINEX D ORAL)    Take by mouth.    HYDROCODONE-ACETAMINOPHEN 7.5-325MG (NORCO) 7.5-325 MG PER TABLET        MULTIVIT-IRON-MIN-FOLIC ACID 3,500-18-0.4 UNIT-MG-MG ORAL CHEW    Take 1 tablet by mouth after lunch.      PSYLLIUM (METAMUCIL) POWDER    Take 1 packet by mouth once daily.    ROSUVASTATIN (CRESTOR) 10 MG TABLET    TAKE 1 TABLET DAILY   Modified Medications    No medications on file   Discontinued Medications    No medications on file

## 2019-02-26 ENCOUNTER — TELEPHONE (OUTPATIENT)
Dept: FAMILY MEDICINE | Facility: CLINIC | Age: 49
End: 2019-02-26

## 2019-02-26 ENCOUNTER — CLINICAL SUPPORT (OUTPATIENT)
Dept: FAMILY MEDICINE | Facility: CLINIC | Age: 49
End: 2019-02-26
Payer: COMMERCIAL

## 2019-02-26 DIAGNOSIS — R35.0 FREQUENT URINATION: Primary | ICD-10-CM

## 2019-02-26 LAB
BILIRUB SERPL-MCNC: ABNORMAL MG/DL
BLOOD URINE, POC: ABNORMAL
COLOR, POC UA: ABNORMAL
GLUCOSE UR QL STRIP: ABNORMAL
KETONES UR QL STRIP: ABNORMAL
LEUKOCYTE ESTERASE URINE, POC: ABNORMAL
NITRITE, POC UA: ABNORMAL
PH, POC UA: 7
PROTEIN, POC: ABNORMAL
SPECIFIC GRAVITY, POC UA: 1
UROBILINOGEN, POC UA: ABNORMAL

## 2019-02-26 PROCEDURE — 81002 POCT URINE DIPSTICK WITHOUT MICROSCOPE: ICD-10-PCS | Mod: S$GLB,,, | Performed by: FAMILY MEDICINE

## 2019-02-26 PROCEDURE — 87186 SC STD MICRODIL/AGAR DIL: CPT

## 2019-02-26 PROCEDURE — 87088 URINE BACTERIA CULTURE: CPT

## 2019-02-26 PROCEDURE — 81002 URINALYSIS NONAUTO W/O SCOPE: CPT | Mod: S$GLB,,, | Performed by: FAMILY MEDICINE

## 2019-02-26 PROCEDURE — 87086 URINE CULTURE/COLONY COUNT: CPT

## 2019-02-26 PROCEDURE — 87077 CULTURE AEROBIC IDENTIFY: CPT

## 2019-02-26 NOTE — TELEPHONE ENCOUNTER
Spoke with pt in regards to message. Stated that she has been having frequent urination and would like to bring a urine specimen for testing. Pt has been placed on nurse schedule for urine dip.

## 2019-02-26 NOTE — TELEPHONE ENCOUNTER
----- Message from Anita Rm sent at 2/26/2019  8:06 AM CST -----  Contact: self/[phone]  Patient called to get a same day appointment and declined getting an appointment with another physician other than their own.    Please call and advise.

## 2019-02-26 NOTE — TELEPHONE ENCOUNTER
"Pt dropped off urine sample for frequent urination. Dipped urine    Color, appearance: pale yellow with sediment  Specific gravity: 1.000  Ph:7  Leukocytes:neg  Nitrates: neg  Protein: trace  Glucose: neg  Ketone: neg  Urobil:neg  Bilirubin" neg  Blood: trace  "

## 2019-03-01 LAB — BACTERIA UR CULT: NORMAL

## 2019-03-03 ENCOUNTER — TELEPHONE (OUTPATIENT)
Dept: FAMILY MEDICINE | Facility: CLINIC | Age: 49
End: 2019-03-03

## 2019-03-03 RX ORDER — AMPICILLIN 500 MG/1
500 CAPSULE ORAL EVERY 6 HOURS
Qty: 28 CAPSULE | Refills: 0 | Status: SHIPPED | OUTPATIENT
Start: 2019-03-03 | End: 2019-04-05

## 2019-03-08 ENCOUNTER — OFFICE VISIT (OUTPATIENT)
Dept: DERMATOLOGY | Facility: CLINIC | Age: 49
End: 2019-03-08
Payer: COMMERCIAL

## 2019-03-08 DIAGNOSIS — Z12.83 SKIN CANCER SCREENING: ICD-10-CM

## 2019-03-08 DIAGNOSIS — D22.9 MULTIPLE BENIGN NEVI: ICD-10-CM

## 2019-03-08 DIAGNOSIS — L82.1 SK (SEBORRHEIC KERATOSIS): ICD-10-CM

## 2019-03-08 DIAGNOSIS — L73.8 SEBACEOUS HYPERPLASIA: ICD-10-CM

## 2019-03-08 DIAGNOSIS — L81.4 LENTIGINES: Primary | ICD-10-CM

## 2019-03-08 DIAGNOSIS — Z85.820 HISTORY OF MELANOMA: ICD-10-CM

## 2019-03-08 PROCEDURE — 99999 PR PBB SHADOW E&M-EST. PATIENT-LVL II: CPT | Mod: PBBFAC,,, | Performed by: DERMATOLOGY

## 2019-03-08 PROCEDURE — 99999 PR PBB SHADOW E&M-EST. PATIENT-LVL II: ICD-10-PCS | Mod: PBBFAC,,, | Performed by: DERMATOLOGY

## 2019-03-08 PROCEDURE — 99214 OFFICE O/P EST MOD 30 MIN: CPT | Mod: S$GLB,,, | Performed by: DERMATOLOGY

## 2019-03-08 PROCEDURE — 99214 PR OFFICE/OUTPT VISIT, EST, LEVL IV, 30-39 MIN: ICD-10-PCS | Mod: S$GLB,,, | Performed by: DERMATOLOGY

## 2019-03-08 NOTE — PROGRESS NOTES
Subjective:       Patient ID:  Isela Graf is a 48 y.o. female who presents for   Chief Complaint   Patient presents with    Skin Check     Pt here today for TBSE     HPI  Pt is here for total body skin check today.  Has a history of melanoma excised from her frontal scalp in Jan 2015 and BCC excised from her R cheek yrs ago.     Pt initially had a biopsy of the scalp lesion by Dr. Wolf which revealed invasive melanoma, 0.42mm thick (with regression to 1.51 mm), but no mitoses or ulceration.   The excision specimen showed residual invasive melanoma extending to a max depth of 0.67 mm with negative margins.  SLN bx was attempted, but there was unsuccessful localization of the sentinel node.      Has not noticed any new, symptomatic, or changing growths on skin.  Had a SC bump on scalp biopsied in the past and it showed calcinosis cutis.    Review of Systems   Constitutional: Negative for fever, chills, weight loss, weight gain, fatigue, night sweats and malaise.   Skin: Negative for daily sunscreen use and activity-related sunscreen use.        Objective:    Physical Exam   Constitutional: She appears well-developed and well-nourished. No distress.   Lymphadenopathy: No supraclavicular adenopathy is present.     She has no cervical adenopathy.     She has no axillary adenopathy.     She has no inguinal adenopathy.   Neurological: She is alert and oriented to person, place, and time. She is not disoriented.   Psychiatric: She has a normal mood and affect.   Skin:   Areas Examined (abnormalities noted in diagram):   Scalp / Hair Palpated and Inspected  Head / Face Inspection Performed  Neck Inspection Performed  Chest / Axilla Inspection Performed  Abdomen Inspection Performed  Genitals / Buttocks / Groin Inspection Performed  Back Inspection Performed  RUE Inspected  LUE Inspection Performed  RLE Inspected  LLE Inspection Performed  Nails and Digits Inspection Performed                   Diagram Legend      Erythematous scaling macule/papule c/w actinic keratosis       Vascular papule c/w angioma      Pigmented verrucoid papule/plaque c/w seborrheic keratosis      Yellow umbilicated papule c/w sebaceous hyperplasia      Irregularly shaped tan macule c/w lentigo     1-2 mm smooth white papules consistent with Milia      Movable subcutaneous cyst with punctum c/w epidermal inclusion cyst      Subcutaneous movable cyst c/w pilar cyst      Firm pink to brown papule c/w dermatofibroma      Pedunculated fleshy papule(s) c/w skin tag(s)      Evenly pigmented macule c/w junctional nevus     Mildly variegated pigmented, slightly irregular-bordered macule c/w mildly atypical nevus      Flesh colored to evenly pigmented papule c/w intradermal nevus       Pink pearly papule/plaque c/w basal cell carcinoma      Erythematous hyperkeratotic cursted plaque c/w SCC      Surgical scar with no sign of skin cancer recurrence      Open and closed comedones      Inflammatory papules and pustules      Verrucoid papule consistent consistent with wart     Erythematous eczematous patches and plaques     Dystrophic onycholytic nail with subungual debris c/w onychomycosis     Umbilicated papule    Erythematous-base heme-crusted tan verrucoid plaque consistent with inflamed seborrheic keratosis     Erythematous Silvery Scaling Plaque c/w Psoriasis     See annotation      Assessment / Plan:        Lentigines  These are benign sun spots which should be monitored for changes. Patient instructed in importance of daily broad spectrum sunscreen use with spf at least 30. Sun avoidance and topical protection/protective clothing discussed.    Multiple benign nevi  Benign-appearing on exam today. Counseled pt to monitor mole(s) and return to clinic if any changes noted or symptoms (bleeding, itching, pain, etc) noted. Brochure provided.    SK (seborrheic keratosis)  These are benign inherited growths without a malignant potential. Reassurance given to  patient. No treatment is necessary.   Treatment of benign, asymptomatic lesions may be considered cosmetic.  Warned about risk of hypo- or hyperpigmentation with treatment and risk of recurrence.    Sebaceous hyperplasia  This is a benign overgrowth of oil glands. Reassurance given. No treatment required.  Treatment of benign, asymptomatic lesions may be considered cosmetic.    History of melanoma and Skin cancer screening  Area of previous melanoma examined. Site well healed with no signs of recurrence.  No lymphadenopathy palpated on exam today.  Total body skin examination performed today including at least 12 points as noted in physical examination. Recommended continuing routine skin exams as well as routine checks with ophthalmology, dentist, and OB/GYN (if female) for any concerning lesions.  Patient instructed in importance of daily broad spectrum sunscreen use with spf at least 30. Sun avoidance and topical protection/protective clothing discussed.      Follow-up in about 1 year (around 3/8/2020) for skin check or sooner for any concerns.

## 2019-03-08 NOTE — PATIENT INSTRUCTIONS
Summer Sun Protection      The [facility] would like to remind you of the importance of sun protection all year round and particularly during the summer when the suns rays are the strongest. It has been proven that both acute and chronic sun exposure damages our cells and leads to skin cancer. Beyond skin cancer, the sun causes 90% of the symptoms of pre-mature skin aging, including wrinkles, lentigines (brown spots), and thin, easily bruised skin. Proper sun protection can help prevent these unwanted conditions.    Many patients report that the dont go in the sun. It has been shown that the average person receives 18 hours of incidental sun exposure per week during activities such as walking through parking lots, driving, or sitting next to windows. This accumulates to several bad sunburns per year!    In choosing sunscreen, you want one that protects against both UVA and UVB rays. It is recommended that you use one of SPF 30 or higher. It is important to apply the sunscreen about 20 minutes prior to sun exposure. Most sunscreens are chemical sunscreens and a reaction must take place in the skin so that they are effective. If they are applied and then you are immediately exposed to the sun or start sweating, this reaction has not had time to take place and you are therefore unprotected. Sunscreen needs to be reapplied every 2 hours if you are participating in water sports or sweating. We recommend Elta MD or Neutrogena Ultra Sheer Dry Touch SPF 55 for daily use; however there are many options and it is most important for you to find one that you will use on a consistent basis.    If you have sensitive skin, you may do best with a sunscreen that contains only physical blockers such as titanium dioxide or zinc oxide. These are typically thicker and harder to apply, however they afford very good protection. Neutrogena Sensitive Skin, Blue Lizard Sensitive Skin (pink top) or Neutrogena Pure  and Free are popular ones.     Aside from sunscreen, clothes with UV protection, wide brimmed hats, and sunglasses are other means of sun protection that we recommend.                        [facility] - DERMATOLOGY  [address]  [place] [address]  Dept: [phone]  Dept Fax: [phone]                                                                               CRYOSURGERY      Your doctor has used a method called cryosurgery to treat your skin condition. Cryosurgery refers to the use of very cold substances to treat a variety of skin conditions such as warts, pre-skin cancers, molluscum contagiosum, sun spots, and several benign growths. The substance we use in cryosurgery is liquid nitrogen and is so cold (-195 degrees Celsius) that is burns when administered.     Following treatment in the office, the skin may immediately burn and become red. You may find the area around the lesion is affected as well. It is sometimes necessary to treat not only the lesion, but a small area of the surrounding normal skin to achieve a good response.     A blister, and even a blood filled blister, may form after treatment.   This is a normal response. If the blister is painful, it is acceptable to sterilize a needle and with rubbing alcohol and gently pop the blister. It is important that you gently wash the area with soap and warm water as the blister fluid may contain wart virus if a wart was treated. Do no remove the roof of the blister.     The area treated can take anywhere from 1-3 weeks to heal. Healing time depends on the kind skin lesion treated, the location, and how aggressively the lesion was treated. It is recommended that the areas treated are covered with Vaseline or bacitracin ointment and a band-aid. If a band-aid is not practical, just ointment applied several times per day will do. Keeping these areas moist will speed the healing time.    Treatment with liquid  nitrogen can leave a scar. In dark skin, it may be a light or dark scar, in light skin it may be a white or pink scar. These will generally fade with time, but may never go away completely.     If you have any concerns after your treatment, please feel free to call the office.       [facility] [place] [address]/ [phone] FAX/ [email or web address]

## 2019-04-05 ENCOUNTER — OFFICE VISIT (OUTPATIENT)
Dept: FAMILY MEDICINE | Facility: CLINIC | Age: 49
End: 2019-04-05
Payer: COMMERCIAL

## 2019-04-05 VITALS
OXYGEN SATURATION: 97 % | WEIGHT: 191.69 LBS | TEMPERATURE: 98 F | HEART RATE: 87 BPM | DIASTOLIC BLOOD PRESSURE: 80 MMHG | BODY MASS INDEX: 35.28 KG/M2 | HEIGHT: 62 IN | SYSTOLIC BLOOD PRESSURE: 130 MMHG

## 2019-04-05 DIAGNOSIS — R09.82 POST-NASAL DRIP: ICD-10-CM

## 2019-04-05 DIAGNOSIS — J02.9 SORE THROAT: ICD-10-CM

## 2019-04-05 DIAGNOSIS — J30.89 ENVIRONMENTAL AND SEASONAL ALLERGIES: Primary | ICD-10-CM

## 2019-04-05 PROCEDURE — 99213 PR OFFICE/OUTPT VISIT, EST, LEVL III, 20-29 MIN: ICD-10-PCS | Mod: S$GLB,,, | Performed by: FAMILY MEDICINE

## 2019-04-05 PROCEDURE — 99213 OFFICE O/P EST LOW 20 MIN: CPT | Mod: S$GLB,,, | Performed by: FAMILY MEDICINE

## 2019-04-05 PROCEDURE — 3008F PR BODY MASS INDEX (BMI) DOCUMENTED: ICD-10-PCS | Mod: CPTII,S$GLB,, | Performed by: FAMILY MEDICINE

## 2019-04-05 PROCEDURE — 3008F BODY MASS INDEX DOCD: CPT | Mod: CPTII,S$GLB,, | Performed by: FAMILY MEDICINE

## 2019-07-06 RX ORDER — CITALOPRAM 10 MG/1
TABLET ORAL
Qty: 90 TABLET | Refills: 1 | Status: SHIPPED | OUTPATIENT
Start: 2019-07-06 | End: 2020-01-09

## 2019-07-06 RX ORDER — HYDROGEN PEROXIDE 3 %
SOLUTION, NON-ORAL MISCELLANEOUS
Qty: 90 CAPSULE | Refills: 1 | Status: SHIPPED | OUTPATIENT
Start: 2019-07-06 | End: 2020-01-09

## 2019-07-06 RX ORDER — ROSUVASTATIN CALCIUM 10 MG/1
TABLET, COATED ORAL
Qty: 90 TABLET | Refills: 1 | Status: SHIPPED | OUTPATIENT
Start: 2019-07-06 | End: 2020-01-09

## 2019-08-24 RX ORDER — ALBUTEROL SULFATE 90 UG/1
2 AEROSOL, METERED RESPIRATORY (INHALATION) EVERY 6 HOURS PRN
Qty: 18 G | Refills: 5 | Status: SHIPPED | OUTPATIENT
Start: 2019-08-24 | End: 2020-11-11

## 2019-09-11 ENCOUNTER — TELEPHONE (OUTPATIENT)
Dept: FAMILY MEDICINE | Facility: CLINIC | Age: 49
End: 2019-09-11

## 2019-09-11 NOTE — TELEPHONE ENCOUNTER
Pt scheduled.     ----- Message from Brianna Black sent at 9/11/2019  8:33 AM CDT -----  Contact: [phone]/self  Patient is requesting a call back regarding getting her 2nd pneumonia shot. Please call her. Thanks

## 2019-09-13 ENCOUNTER — CLINICAL SUPPORT (OUTPATIENT)
Dept: FAMILY MEDICINE | Facility: CLINIC | Age: 49
End: 2019-09-13
Payer: COMMERCIAL

## 2019-09-13 DIAGNOSIS — Z23 NEED FOR VACCINATION FOR PNEUMOCOCCUS: Primary | ICD-10-CM

## 2019-09-13 PROCEDURE — 90732 PNEUMOCOCCAL POLYSACCHARIDE VACCINE 23-VALENT =>2YO SQ IM: ICD-10-PCS | Mod: S$GLB,,, | Performed by: FAMILY MEDICINE

## 2019-09-13 PROCEDURE — 90732 PPSV23 VACC 2 YRS+ SUBQ/IM: CPT | Mod: S$GLB,,, | Performed by: FAMILY MEDICINE

## 2019-09-13 PROCEDURE — 90471 PNEUMOCOCCAL POLYSACCHARIDE VACCINE 23-VALENT =>2YO SQ IM: ICD-10-PCS | Mod: S$GLB,,, | Performed by: FAMILY MEDICINE

## 2019-09-13 PROCEDURE — 90471 IMMUNIZATION ADMIN: CPT | Mod: S$GLB,,, | Performed by: FAMILY MEDICINE

## 2019-09-13 NOTE — PROGRESS NOTES
Pt comes in for [id number] vaccine right dose, right route, right medication all verified 2 pt identifier used. Instructed to wait 15 mins following injcetion

## 2019-10-14 NOTE — PROGRESS NOTES
Subjective:       Patient ID: Isela Graf is a 48 y.o. female.    Chief Complaint:   Chief Complaint   Patient presents with    Sore Throat    Otalgia     bilt        Sore Throat    This is a new problem. The current episode started in the past 7 days. The problem has been unchanged. Neither side of throat is experiencing more pain than the other. There has been no fever. Associated symptoms include congestion and coughing. Pertinent negatives include no abdominal pain, diarrhea, ear discharge, ear pain, headaches, neck pain, shortness of breath, stridor, swollen glands, trouble swallowing or vomiting. Associated symptoms comments: Post nasal drip and sinus issues. . She has had no exposure to strep or mono. Treatments tried: antihistamine and decongestant. The treatment provided mild relief.     Review of Systems   Constitutional: Negative for activity change, appetite change, chills, fatigue and fever.   HENT: Positive for congestion, postnasal drip, sinus pressure, sinus pain and sore throat. Negative for ear discharge, ear pain, rhinorrhea and trouble swallowing.    Eyes: Negative for photophobia, pain, redness, itching and visual disturbance.   Respiratory: Positive for cough. Negative for chest tightness, shortness of breath, wheezing and stridor.    Cardiovascular: Negative for chest pain, palpitations and leg swelling.   Gastrointestinal: Negative for abdominal distention, abdominal pain, blood in stool, diarrhea, nausea and vomiting.   Genitourinary: Negative for dysuria, pelvic pain, vaginal bleeding, vaginal discharge and vaginal pain.   Musculoskeletal: Negative for arthralgias, back pain, gait problem and neck pain.   Skin: Negative for color change, pallor and rash.   Neurological: Negative for dizziness, tremors, weakness, light-headedness, numbness and headaches.   Psychiatric/Behavioral: Negative for agitation, behavioral problems, confusion and sleep disturbance.       Past Medical History:    Diagnosis Date    Allergy     Anxiety     Depression     Hyperlipidemia     Melanoma 1/26/2015    scalp excised by Dr Aldridge    Melanoma of scalp 1/12/2015    Open wound of scalp, complicated 2/6/2015     Social History     Socioeconomic History    Marital status:      Spouse name: Not on file    Number of children: Not on file    Years of education: Not on file    Highest education level: Not on file   Occupational History    Not on file   Social Needs    Financial resource strain: Not on file    Food insecurity:     Worry: Not on file     Inability: Not on file    Transportation needs:     Medical: Not on file     Non-medical: Not on file   Tobacco Use    Smoking status: Never Smoker    Smokeless tobacco: Never Used   Substance and Sexual Activity    Alcohol use: Yes     Comment: occasionally    Drug use: No    Sexual activity: Yes     Partners: Male     Birth control/protection: None   Lifestyle    Physical activity:     Days per week: Not on file     Minutes per session: Not on file    Stress: Not on file   Relationships    Social connections:     Talks on phone: Not on file     Gets together: Not on file     Attends Jainism service: Not on file     Active member of club or organization: Not on file     Attends meetings of clubs or organizations: Not on file     Relationship status: Not on file   Other Topics Concern    Are you pregnant or think you may be? 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   HENT:   Head: Normocephalic.   Right Ear: Tympanic membrane, external ear and ear canal normal.   Left Ear: Tympanic membrane and external ear normal.   Mouth/Throat: Posterior oropharyngeal erythema present.   Eyes: Conjunctivae are normal.   Neck: Normal range of motion. Neck supple.   Cardiovascular: Normal rate, regular rhythm and normal  heart sounds.   Pulmonary/Chest: Effort normal and breath sounds normal.   Neurological: She is alert.   Skin: Skin is warm and dry.   Psychiatric: Her behavior is normal.       Assessment:       Environmental and seasonal allergies       - Continue with antihistamine and decongestant       - Add OTC flonase once daily       - Consider adding a daily sinus rinse.   Post-nasal drip    Sore throat         Area M Indication Text: Tumors in this location are included in Area M (cheek, forehead, scalp, neck, jawline and pretibial skin).  Mohs surgery is indicated for tumors in these anatomic locations.

## 2019-10-21 ENCOUNTER — TELEPHONE (OUTPATIENT)
Dept: FAMILY MEDICINE | Facility: CLINIC | Age: 49
End: 2019-10-21

## 2019-10-21 ENCOUNTER — IMMUNIZATION (OUTPATIENT)
Dept: FAMILY MEDICINE | Facility: CLINIC | Age: 49
End: 2019-10-21
Payer: COMMERCIAL

## 2019-10-21 PROCEDURE — 90471 IMMUNIZATION ADMIN: CPT | Mod: S$GLB,,, | Performed by: FAMILY MEDICINE

## 2019-10-21 PROCEDURE — 90471 FLU VACCINE (QUAD) GREATER THAN OR EQUAL TO 3YO PRESERVATIVE FREE IM: ICD-10-PCS | Mod: S$GLB,,, | Performed by: FAMILY MEDICINE

## 2019-10-21 PROCEDURE — 90686 FLU VACCINE (QUAD) GREATER THAN OR EQUAL TO 3YO PRESERVATIVE FREE IM: ICD-10-PCS | Mod: S$GLB,,, | Performed by: FAMILY MEDICINE

## 2019-10-21 PROCEDURE — 90686 IIV4 VACC NO PRSV 0.5 ML IM: CPT | Mod: S$GLB,,, | Performed by: FAMILY MEDICINE

## 2019-10-21 NOTE — TELEPHONE ENCOUNTER
----- Message from Shanda Jaramillo sent at 10/21/2019  9:10 AM CDT -----  Contact: Patient   Patient is calling to see if there are any appointments to get a flu shot this morning     [phone]

## 2019-11-05 ENCOUNTER — OFFICE VISIT (OUTPATIENT)
Dept: FAMILY MEDICINE | Facility: CLINIC | Age: 49
End: 2019-11-05
Payer: COMMERCIAL

## 2019-11-05 ENCOUNTER — TELEPHONE (OUTPATIENT)
Dept: FAMILY MEDICINE | Facility: CLINIC | Age: 49
End: 2019-11-05

## 2019-11-05 VITALS
TEMPERATURE: 98 F | WEIGHT: 197.63 LBS | SYSTOLIC BLOOD PRESSURE: 138 MMHG | HEART RATE: 85 BPM | BODY MASS INDEX: 36.37 KG/M2 | HEIGHT: 62 IN | DIASTOLIC BLOOD PRESSURE: 84 MMHG | OXYGEN SATURATION: 96 %

## 2019-11-05 DIAGNOSIS — J06.9 ACUTE URI: Primary | ICD-10-CM

## 2019-11-05 DIAGNOSIS — M46.1 SACROILIITIS: ICD-10-CM

## 2019-11-05 PROCEDURE — 3008F BODY MASS INDEX DOCD: CPT | Mod: CPTII,S$GLB,, | Performed by: FAMILY MEDICINE

## 2019-11-05 PROCEDURE — 3008F PR BODY MASS INDEX (BMI) DOCUMENTED: ICD-10-PCS | Mod: CPTII,S$GLB,, | Performed by: FAMILY MEDICINE

## 2019-11-05 PROCEDURE — 99214 OFFICE O/P EST MOD 30 MIN: CPT | Mod: S$GLB,,, | Performed by: FAMILY MEDICINE

## 2019-11-05 PROCEDURE — 99214 PR OFFICE/OUTPT VISIT, EST, LEVL IV, 30-39 MIN: ICD-10-PCS | Mod: S$GLB,,, | Performed by: FAMILY MEDICINE

## 2019-11-05 NOTE — LETTER
November 5, 2019      [address] [address]  Phone: [phone]  Fax: [phone]       Patient: Isela Graf   YOB: 1970  Date of Visit: 11/05/2019    To Whom It May Concern:    Marielos Graf  was at [facility] on 11/05/2019. She may return to work/school on 11/07/2019 with no restrictions. If you have any questions or concerns, or if I can be of further assistance, please do not hesitate to contact me.    Sincerely,    Lili Vazquez, DO

## 2019-11-05 NOTE — PROGRESS NOTES
Subjective:       Patient ID: Isela Graf is a 49 y.o. female.    Chief Complaint:       Back Pain   This is a new problem. The current episode started in the past 7 days. The problem occurs constantly. The problem is unchanged. The pain is present in the sacro-iliac. The quality of the pain is described as aching. The pain is moderate. The symptoms are aggravated by twisting. Pertinent negatives include no abdominal pain, bladder incontinence, bowel incontinence, chest pain, dysuria, fever, headaches, leg pain, numbness, paresis, pelvic pain, perianal numbness, tingling, weakness or weight loss. Risk factors include lack of exercise. She has tried NSAIDs for the symptoms. The treatment provided moderate relief.   URI    This is a new problem. The current episode started in the past 7 days. The problem has been gradually worsening. There has been no fever. Associated symptoms include congestion, coughing, sinus pain and a sore throat. Pertinent negatives include no abdominal pain, chest pain, diarrhea, dysuria, ear pain, headaches, joint pain, joint swelling, nausea, neck pain, plugged ear sensation, rash, rhinorrhea, sneezing, swollen glands, vomiting or wheezing. She has tried decongestant for the symptoms. The treatment provided mild relief.     Review of Systems   Constitutional: Negative for activity change, appetite change, chills, fatigue, fever and weight loss.   HENT: Positive for congestion, postnasal drip, sinus pressure, sinus pain and sore throat. Negative for ear pain, rhinorrhea and sneezing.    Eyes: Negative for photophobia, pain, redness, itching and visual disturbance.   Respiratory: Positive for cough. Negative for chest tightness and wheezing.    Cardiovascular: Negative for chest pain, palpitations and leg swelling.   Gastrointestinal: Negative for abdominal distention, abdominal pain, blood in stool, bowel incontinence, diarrhea, nausea and vomiting.   Genitourinary: Negative for bladder  incontinence, dysuria, pelvic pain, vaginal bleeding, vaginal discharge and vaginal pain.   Musculoskeletal: Negative for arthralgias, back pain, gait problem, joint pain and neck pain.   Skin: Negative for color change, pallor and rash.   Neurological: Negative for dizziness, tingling, tremors, weakness, light-headedness, numbness and headaches.   Psychiatric/Behavioral: Negative for agitation, behavioral problems, confusion and sleep disturbance.       Past Medical History:   Diagnosis Date    Allergy     Anxiety     Depression     Hyperlipidemia     Melanoma 1/26/2015    scalp excised by Dr Aldridge    Melanoma of scalp 1/12/2015    Open wound of scalp, complicated 2/6/2015     Social History     Socioeconomic History    Marital status:      Spouse name: Not on file    Number of children: Not on file    Years of education: Not on file    Highest education level: Not on file   Occupational History    Not on file   Social Needs    Financial resource strain: Not on file    Food insecurity:     Worry: Not on file     Inability: Not on file    Transportation needs:     Medical: Not on file     Non-medical: Not on file   Tobacco Use    Smoking status: Never Smoker    Smokeless tobacco: Never Used   Substance and Sexual Activity    Alcohol use: Yes     Comment: occasionally    Drug use: No    Sexual activity: Yes     Partners: Male     Birth control/protection: None   Lifestyle    Physical activity:     Days per week: Not on file     Minutes per session: Not on file    Stress: Not on file   Relationships    Social connections:     Talks on phone: Not on file     Gets together: Not on file     Attends Holiness service: Not on file     Active member of club or organization: Not on file     Attends meetings of clubs or organizations: Not on file     Relationship status: Not on file   Other Topics Concern    Are you pregnant or think you may be? No    Breast-feeding Not Asked   Social History  "Narrative    Works at a day care       Objective:     /84 (BP Location: Left arm, Patient Position: Sitting)   Pulse 85   Temp 97.9 °F (36.6 °C) (Oral)   Ht 5' 1.5" (1.562 m)   Wt 89.7 kg (197 lb 10.3 oz)   SpO2 96%   BMI 36.74 kg/m²     Physical Exam   Constitutional: She appears well-developed and well-nourished.   HENT:   Head: Normocephalic.   Right Ear: Tympanic membrane, external ear and ear canal normal.   Left Ear: Tympanic membrane, external ear and ear canal normal.   Mouth/Throat: Posterior oropharyngeal erythema present.   Eyes: Conjunctivae are normal.   Neck: Normal range of motion. Neck supple.   Cardiovascular: Normal rate, regular rhythm and normal heart sounds.   Pulmonary/Chest: Effort normal and breath sounds normal.   Musculoskeletal:        Lumbar back: She exhibits tenderness and pain. She exhibits normal range of motion, no bony tenderness, no swelling, no edema and no deformity.        Back:    Neurological: She is alert.   Skin: Skin is warm and dry.   Psychiatric: Her behavior is normal.       Assessment:       Acute URI   - I counseled the patient on general home care guidelines for cough and congestion including increasing fluid intake, getting plenty of rest and use of OTC cough and cold medications.  I recommended guafenesin for congestion and dextromethorphan as directed for cough.  A brand like Mucinex DM is recommended.  Avoidance of decongestants is recommended for patients with heart problems and hypertension.  Extra vitamin C may also benefit.  Return to clinic if symptoms last longer than 10 days or sooner if worsen with symptoms like fever > 100.4, severe sinus pain or headache, thick yellow nasal discharge or sputum, dehydration or lethargy.    Sacroiliitis   - Discussed exercises.  Proper lifting techniques and use of NSAIDS.  Call if no improvement in 2 weeks.             "

## 2019-11-05 NOTE — TELEPHONE ENCOUNTER
----- Message from Brianna Black sent at 11/5/2019  8:10 AM CST -----  Contact: [phone]//Mohan  Type:  Same Day Appointment Request    Caller is requesting a same day appointment.  Caller declined first available appointment listed below.    Name of Caller: /Mohan  When is the first available appointment? 11/13  Symptoms: Pain in her back and painful breathing  Best Call Back Number:   Additional Information:  Any doctor as early as possible

## 2019-11-05 NOTE — PATIENT INSTRUCTIONS
Adult Self-Care for Colds  Colds are caused by viruses. They can't be cured with antibiotics. However, you can ease symptoms and support your body's efforts to heal itself.  No matter which symptoms you have, be sure to:  Drink plenty of fluids (water or clear soup)  Stop smoking and drinking alcohol  Get plenty of rest    Understand a fever  Take your temperature several times a day. If your fever is 100.4°F (38.0°C) for more than a day, call your healthcare provider.  Relax, lie down. Go to bed if you want. Just get off your feet and rest. Also, drink plenty of fluids to avoid dehydration.  Take acetaminophen or a nonsteroidal anti-inflammatory agent (NSAID), such as ibuprofen.  Treat a troubled nose kindly  Breathe steam or heated humidified air to open blocked nasal passages.  a hot shower or use a vaporizer. Be careful not to get burned by the steam.  Saline nasal sprays and decongestant tablets help open a stuffy nose. Antihistamines can also help, but they can cause side effects such as drowsiness and drying of the eyes, nose, and mouth.  Soothe a sore throat and cough  Gargle every 2 hours with 1/4 teaspoon of salt dissolved in 1/2 cup of warm water. Suck on throat lozenges and cough drops to moisten your throat.  Cough medicines are available but it is unclear how well they actually work.  Take acetaminophen or an NSAID, such as ibuprofen, to ease throat pain  Ease digestive problems  Put fluids back into your body. Take frequent sips of clear liquids such as water or broth. Avoid drinks that have a lot of sugar in them, such as juices and sodas. These can make diarrhea worse. Older children and adults can drink sports drinks.  As your appetite returns, you can resume your normal diet. Ask your healthcare provider if there are any foods you should avoid.  When to seek medical care  When you first notice symptoms, ask your healthcare provider if antiviral medicines are appropriate. Antibiotics  should not be taken for colds or flu. Also, call your healthcare provider if you have any of the following symptoms or if you aren't feeling better after 7 days:  Shortness of breath  Pain or pressure in the chest or belly (abdomen)  Worsening symptoms, especially after a period of improvement  Fever of 100.4°F  (38.0°C) or higher, or fever that doesn't go down with medicine  Sudden dizziness or confusion  Severe or continued vomiting  Signs of dehydration, including extreme thirst, dark urine, infrequent urination, dry mouth  Spotted, red, or very sore throat   Date Last Reviewed: 12/1/2016 © 2000-2017 [employer]. [address] [place] [address], [place] [address]. All rights reserved. This information is not intended as a substitute for professional medical care. Always follow your healthcare professional's instructions.        Sacroiliitis  The sacrum is the triangle-shaped bone at the base of the spine. It is linked to the other pelvis bones by the sacroiliac joints, also called SI joints. Sacroiliitis is when one or both SI joints are hurt or inflamed. It can make small movements of the lower back and pelvis very painful.        This condition has been linked to other diseases. They include ankylosing spondylitis, rheumatoid arthritis, psoriasis, and Crohns disease or colitis. Symptoms may include pain or stiffness in the hips, lower back, thighs, or buttocks. Pain occurs most often in the morning or after sitting for long periods of time. The pain may get worse when you walk. The swinging motion of the hips strains the SI joints.  Sacroiliitis is caused by many factors such as:  · Heavy lifting, especially if not done the right way  · Severe injury, such as a fall or car accident  · Osteoarthritis  · Pregnancy  · Infection of the joint  This condition is hard to diagnose. It may be confused with other causes of low back pain. To confirm the diagnosis, you may be given a shot of numbing medicine in the  SI joint. Treatment includes rest, physical therapy, and anti-inflammatory medicines. If another health problem is the cause, then that must also be treated. More testing may be needed if your symptoms dont get better.  Home care  · If your healthcare provider has prescribed medicines, take all of them as directed.  · You may use over-the-counter pain medicine to control pain, unless another medicine was prescribed. If prednisone was prescribed, dont use NSAIDs, or nonsteroidal anti-inflammatory drugs, such as ibuprofen or naproxen. Talk with your provider before using this medicine if you have chronic liver or kidney disease or ever had a stomach ulcer or GI bleeding.  · If you were referred to physical therapy, make an appointment. Be sure to do any prescribed exercises.  · Dont smoke. Smoking reduces blood flow to the inflamed area. This makes it harder to treat.  Follow-up care  Follow up with your healthcare provider, or as advised.  If you had an X-ray or an MRI, you will be notified of any new findings that may affect your care.  When to seek medical advice  Contact your healthcare provider right away if any of these occur:  · Increasing low back pain  · Inflammation of the eyes  · Skin rash or redness  · Weakness or numbness in one or both legs  · Loss of bowel or bladder control  · Numbness in the groin area  Date Last Reviewed: 11/24/2015  © 9435-4349 [employer]. [address] [place] [address], [place] [address]. All rights reserved. This information is not intended as a substitute for professional medical care. Always follow your healthcare professional's instructions.

## 2020-01-09 RX ORDER — HYDROGEN PEROXIDE 3 %
SOLUTION, NON-ORAL MISCELLANEOUS
Qty: 90 CAPSULE | Refills: 3 | Status: SHIPPED | OUTPATIENT
Start: 2020-01-09 | End: 2021-01-05

## 2020-01-09 RX ORDER — CITALOPRAM 10 MG/1
TABLET ORAL
Qty: 90 TABLET | Refills: 3 | Status: SHIPPED | OUTPATIENT
Start: 2020-01-09 | End: 2021-01-05

## 2020-01-09 RX ORDER — ROSUVASTATIN CALCIUM 10 MG/1
TABLET, COATED ORAL
Qty: 90 TABLET | Refills: 3 | Status: SHIPPED | OUTPATIENT
Start: 2020-01-09 | End: 2021-01-05

## 2020-01-13 ENCOUNTER — TELEPHONE (OUTPATIENT)
Dept: OBSTETRICS AND GYNECOLOGY | Facility: CLINIC | Age: 50
End: 2020-01-13

## 2020-01-13 DIAGNOSIS — Z12.31 ENCOUNTER FOR SCREENING MAMMOGRAM FOR BREAST CANCER: Primary | ICD-10-CM

## 2020-01-13 NOTE — TELEPHONE ENCOUNTER
----- Message from Jimy Sawyer sent at 1/13/2020  3:20 PM CST -----  Contact: pt   Pt called to have order in for mammo.        [phone] pt callback number

## 2020-02-07 ENCOUNTER — TELEPHONE (OUTPATIENT)
Dept: OBSTETRICS AND GYNECOLOGY | Facility: CLINIC | Age: 50
End: 2020-02-07

## 2020-02-07 NOTE — TELEPHONE ENCOUNTER
----- Message from Wilmer Spears sent at 2/7/2020 12:34 PM CST -----  Contact: Pt's  Mohan Graf   Pt would like to be called back regarding rescheduling appt on any Friday after lunch time with mammogram appt. Pt runs a  and would like an afternoon appt requested. First available was not until March 2020.    Pt can be reached at [phone].    Thank You

## 2020-02-27 ENCOUNTER — PATIENT OUTREACH (OUTPATIENT)
Dept: ADMINISTRATIVE | Facility: OTHER | Age: 50
End: 2020-02-27

## 2020-02-27 ENCOUNTER — TELEPHONE (OUTPATIENT)
Dept: ADMINISTRATIVE | Facility: OTHER | Age: 50
End: 2020-02-27

## 2020-02-27 DIAGNOSIS — Z12.11 ENCOUNTER FOR FIT (FECAL IMMUNOCHEMICAL TEST) SCREENING: Primary | ICD-10-CM

## 2020-02-27 NOTE — PROGRESS NOTES
Chart reviewed.   Immunizations: updated  Orders placed: Fit kit  Upcoming appts to satisfy THUY topics: Mammogram 4/23

## 2020-02-28 ENCOUNTER — OFFICE VISIT (OUTPATIENT)
Dept: DERMATOLOGY | Facility: CLINIC | Age: 50
End: 2020-02-28
Payer: COMMERCIAL

## 2020-02-28 DIAGNOSIS — L57.0 AK (ACTINIC KERATOSIS): Primary | ICD-10-CM

## 2020-02-28 DIAGNOSIS — Z12.83 SKIN CANCER SCREENING: ICD-10-CM

## 2020-02-28 DIAGNOSIS — B00.9 HSV-1 (HERPES SIMPLEX VIRUS 1) INFECTION: ICD-10-CM

## 2020-02-28 DIAGNOSIS — L73.8 SEBACEOUS HYPERPLASIA: ICD-10-CM

## 2020-02-28 DIAGNOSIS — L21.9 ACUTE SEBORRHEIC DERMATITIS: ICD-10-CM

## 2020-02-28 DIAGNOSIS — Z85.820 HISTORY OF MELANOMA: ICD-10-CM

## 2020-02-28 DIAGNOSIS — D22.9 MULTIPLE BENIGN NEVI: ICD-10-CM

## 2020-02-28 DIAGNOSIS — L81.4 LENTIGINES: ICD-10-CM

## 2020-02-28 DIAGNOSIS — L82.1 SK (SEBORRHEIC KERATOSIS): ICD-10-CM

## 2020-02-28 PROCEDURE — 99214 PR OFFICE/OUTPT VISIT, EST, LEVL IV, 30-39 MIN: ICD-10-PCS | Mod: 25,S$GLB,, | Performed by: DERMATOLOGY

## 2020-02-28 PROCEDURE — 99999 PR PBB SHADOW E&M-EST. PATIENT-LVL II: ICD-10-PCS | Mod: PBBFAC,,, | Performed by: DERMATOLOGY

## 2020-02-28 PROCEDURE — 17000 DESTRUCT PREMALG LESION: CPT | Mod: S$GLB,,, | Performed by: DERMATOLOGY

## 2020-02-28 PROCEDURE — 99999 PR PBB SHADOW E&M-EST. PATIENT-LVL II: CPT | Mod: PBBFAC,,, | Performed by: DERMATOLOGY

## 2020-02-28 PROCEDURE — 17000 PR DESTRUCTION(LASER SURGERY,CRYOSURGERY,CHEMOSURGERY),PREMALIGNANT LESIONS,FIRST LESION: ICD-10-PCS | Mod: S$GLB,,, | Performed by: DERMATOLOGY

## 2020-02-28 PROCEDURE — 99214 OFFICE O/P EST MOD 30 MIN: CPT | Mod: 25,S$GLB,, | Performed by: DERMATOLOGY

## 2020-02-28 RX ORDER — FLUOCINONIDE TOPICAL SOLUTION USP, 0.05% 0.5 MG/ML
SOLUTION TOPICAL
Qty: 60 ML | Refills: 3 | Status: SHIPPED | OUTPATIENT
Start: 2020-02-28 | End: 2022-07-19

## 2020-02-28 RX ORDER — VALACYCLOVIR HYDROCHLORIDE 1 G/1
TABLET, FILM COATED ORAL
Qty: 4 TABLET | Refills: 4 | Status: SHIPPED | OUTPATIENT
Start: 2020-02-28 | End: 2020-05-12

## 2020-02-28 RX ORDER — KETOCONAZOLE 20 MG/ML
SHAMPOO, SUSPENSION TOPICAL
Qty: 120 ML | Refills: 5 | Status: SHIPPED | OUTPATIENT
Start: 2020-02-28 | End: 2022-07-19

## 2020-02-28 NOTE — PROGRESS NOTES
Subjective:       Patient ID:  Isela Graf is a 49 y.o. female who presents for   Chief Complaint   Patient presents with    Skin Check     Tbse     HPI   Pt presents today for Tbse, she wants her scalp checked today. States she was picking at an irritated spot in her scalp and now it hurts.    Has a history of melanoma excised from her frontal scalp in Jan 2015 and BCC excised from her R cheek yrs ago.     Pt initially had a biopsy of the scalp lesion by Dr. Wolf which revealed invasive melanoma, 0.42mm thick (with regression to 1.51 mm), but no mitoses or ulceration.   The excision specimen showed residual invasive melanoma extending to a max depth of 0.67 mm with negative margins.  SLN bx was attempted, but there was unsuccessful localization of the sentinel node.        Review of Systems   Constitutional: Negative for fever, chills, weight loss, weight gain, fatigue, night sweats and malaise.   Skin: Positive for activity-related sunscreen use. Negative for daily sunscreen use and wears hat.   Hematologic/Lymphatic: Does not bruise/bleed easily.        Objective:    Physical Exam   Constitutional: She appears well-developed and well-nourished. No distress.   Lymphadenopathy: No supraclavicular adenopathy is present.     She has no cervical adenopathy.     She has no axillary adenopathy.     She has no inguinal adenopathy.   Neurological: She is alert and oriented to person, place, and time. She is not disoriented.   Psychiatric: She has a normal mood and affect.   Skin:   Areas Examined (abnormalities noted in diagram):   Scalp / Hair Palpated and Inspected  Head / Face Inspection Performed  Neck Inspection Performed  Chest / Axilla Inspection Performed  Abdomen Inspection Performed  Genitals / Buttocks / Groin Inspection Performed  Back Inspection Performed  RUE Inspected  LUE Inspection Performed  RLE Inspected  LLE Inspection Performed  Nails and Digits Inspection Performed                   Diagram  Legend     Erythematous scaling macule/papule c/w actinic keratosis       Vascular papule c/w angioma      Pigmented verrucoid papule/plaque c/w seborrheic keratosis      Yellow umbilicated papule c/w sebaceous hyperplasia      Irregularly shaped tan macule c/w lentigo     1-2 mm smooth white papules consistent with Milia      Movable subcutaneous cyst with punctum c/w epidermal inclusion cyst      Subcutaneous movable cyst c/w pilar cyst      Firm pink to brown papule c/w dermatofibroma      Pedunculated fleshy papule(s) c/w skin tag(s)      Evenly pigmented macule c/w junctional nevus     Mildly variegated pigmented, slightly irregular-bordered macule c/w mildly atypical nevus      Flesh colored to evenly pigmented papule c/w intradermal nevus       Pink pearly papule/plaque c/w basal cell carcinoma      Erythematous hyperkeratotic cursted plaque c/w SCC      Surgical scar with no sign of skin cancer recurrence      Open and closed comedones      Inflammatory papules and pustules      Verrucoid papule consistent consistent with wart     Erythematous eczematous patches and plaques     Dystrophic onycholytic nail with subungual debris c/w onychomycosis     Umbilicated papule    Erythematous-base heme-crusted tan verrucoid plaque consistent with inflamed seborrheic keratosis     Erythematous Silvery Scaling Plaque c/w Psoriasis     See annotation      Assessment / Plan:        AK (actinic keratosis)  Cryosurgery Procedure Note    Verbal consent from the patient is obtained including, but not limited to, risk of hypopigmentation/hyperpigmentation, scar, recurrence of lesion. The patient is aware of the precancerous quality and need for treatment of these lesions. Liquid nitrogen cryosurgery is applied to the 1 actinic keratoses, as detailed in the physical exam, to produce a freeze injury. The patient is aware that blisters may form and is instructed on wound care with gentle cleansing and use of vaseline ointment to  keep moist until healed. The patient is supplied a handout on cryosurgery and is instructed to call if lesions do not completely resolve.    HSV-1 (herpes simplex virus 1) infection  -     valACYclovir (VALTREX) 1000 MG tablet; Take 2 tabs po BID x 1 day. Begin at first sign of outbreak  Dispense: 4 tablet; Refill: 4  - Start today after cryo of AK near lower lip    Acute seborrheic dermatitis  -     ketoconazole (NIZORAL) 2 % shampoo; Wash hair with medicated shampoo at least 2x/week - let sit on scalp at least 5 minutes prior to rinsing  Dispense: 120 mL; Refill: 5  -     fluocinonide (LIDEX) 0.05 % external solution; AAA on scalp qday - bid prn itching  Dispense: 60 mL; Refill: 3    Sebaceous hyperplasia  This is a benign overgrowth of oil glands. Reassurance given. No treatment required.  Treatment of benign, asymptomatic lesions may be considered cosmetic.    Lentigines  These are benign sun spots which should be monitored for changes. Patient instructed in importance of daily broad spectrum sunscreen use with spf at least 30. Sun avoidance and topical protection/protective clothing discussed.    Multiple benign nevi  Benign-appearing on exam today. Counseled pt to monitor mole(s) and return to clinic if any changes noted or symptoms (bleeding, itching, pain, etc) noted. Brochure provided.    SK (seborrheic keratosis)  These are benign inherited growths without a malignant potential. Reassurance given to patient. No treatment is necessary.   Treatment of benign, asymptomatic lesions may be considered cosmetic.  Warned about risk of hypo- or hyperpigmentation with treatment and risk of recurrence.    Skin cancer screening, History of melanoma  Total body skin examination performed today including at least 12 points as noted in physical examination. No lesions suspicious for malignancy noted.  Patient instructed in importance of daily broad spectrum sunscreen use with spf at least 30. Sun avoidance and topical  protection/protective clothing discussed.  No LAD    Follow up in about 1 year (around 2/28/2021) for skin check or sooner for any concerns.

## 2020-02-28 NOTE — PATIENT INSTRUCTIONS
Summer Sun Protection      The [facility] would like to remind you of the importance of sun protection all year round and particularly during the summer when the suns rays are the strongest. It has been proven that both acute and chronic sun exposure damages our cells and leads to skin cancer. Beyond skin cancer, the sun causes 90% of the symptoms of pre-mature skin aging, including wrinkles, lentigines (brown spots), and thin, easily bruised skin. Proper sun protection can help prevent these unwanted conditions.    Many patients report that the dont go in the sun. It has been shown that the average person receives 18 hours of incidental sun exposure per week during activities such as walking through parking lots, driving, or sitting next to windows. This accumulates to several bad sunburns per year!    In choosing sunscreen, you want one that protects against both UVA and UVB rays. It is recommended that you use one of SPF 30 or higher. It is important to apply the sunscreen about 20 minutes prior to sun exposure. Most sunscreens are chemical sunscreens and a reaction must take place in the skin so that they are effective. If they are applied and then you are immediately exposed to the sun or start sweating, this reaction has not had time to take place and you are therefore unprotected. Sunscreen needs to be reapplied every 2 hours if you are participating in water sports or sweating. We recommend Elta MD or Neutrogena Ultra Sheer Dry Touch SPF 55 for daily use; however there are many options and it is most important for you to find one that you will use on a consistent basis.    If you have sensitive skin, you may do best with a sunscreen that contains only physical blockers such as titanium dioxide or zinc oxide. These are typically thicker and harder to apply, however they afford very good protection. Neutrogena Sensitive Skin, Blue Lizard Sensitive Skin (pink top) or Neutrogena Pure  and Free are popular ones.     Aside from sunscreen, clothes with UV protection, wide brimmed hats, and sunglasses are other means of sun protection that we recommend.                        [facility] - DERMATOLOGY  [address]  [place] [address]  Dept: [phone]  Dept Fax: [phone]                                                                               CRYOSURGERY      Your doctor has used a method called cryosurgery to treat your skin condition. Cryosurgery refers to the use of very cold substances to treat a variety of skin conditions such as warts, pre-skin cancers, molluscum contagiosum, sun spots, and several benign growths. The substance we use in cryosurgery is liquid nitrogen and is so cold (-195 degrees Celsius) that is burns when administered.     Following treatment in the office, the skin may immediately burn and become red. You may find the area around the lesion is affected as well. It is sometimes necessary to treat not only the lesion, but a small area of the surrounding normal skin to achieve a good response.     A blister, and even a blood filled blister, may form after treatment.   This is a normal response. If the blister is painful, it is acceptable to sterilize a needle and with rubbing alcohol and gently pop the blister. It is important that you gently wash the area with soap and warm water as the blister fluid may contain wart virus if a wart was treated. Do no remove the roof of the blister.     The area treated can take anywhere from 1-3 weeks to heal. Healing time depends on the kind skin lesion treated, the location, and how aggressively the lesion was treated. It is recommended that the areas treated are covered with Vaseline or bacitracin ointment and a band-aid. If a band-aid is not practical, just ointment applied several times per day will do. Keeping these areas moist will speed the healing time.    Treatment with liquid  nitrogen can leave a scar. In dark skin, it may be a light or dark scar, in light skin it may be a white or pink scar. These will generally fade with time, but may never go away completely.     If you have any concerns after your treatment, please feel free to call the office.       [facility] [place] [address]/ [phone] FAX/ [email or web address]

## 2020-05-11 ENCOUNTER — PATIENT OUTREACH (OUTPATIENT)
Dept: ADMINISTRATIVE | Facility: OTHER | Age: 50
End: 2020-05-11

## 2020-05-12 ENCOUNTER — OFFICE VISIT (OUTPATIENT)
Dept: GASTROENTEROLOGY | Facility: CLINIC | Age: 50
End: 2020-05-12
Payer: COMMERCIAL

## 2020-05-12 VITALS
HEIGHT: 62 IN | WEIGHT: 190.06 LBS | DIASTOLIC BLOOD PRESSURE: 86 MMHG | SYSTOLIC BLOOD PRESSURE: 156 MMHG | HEART RATE: 94 BPM | BODY MASS INDEX: 34.98 KG/M2

## 2020-05-12 DIAGNOSIS — Z12.11 COLON CANCER SCREENING: ICD-10-CM

## 2020-05-12 DIAGNOSIS — K21.9 GASTROESOPHAGEAL REFLUX DISEASE, ESOPHAGITIS PRESENCE NOT SPECIFIED: Primary | ICD-10-CM

## 2020-05-12 PROCEDURE — 99999 PR PBB SHADOW E&M-EST. PATIENT-LVL IV: ICD-10-PCS | Mod: PBBFAC,,, | Performed by: INTERNAL MEDICINE

## 2020-05-12 PROCEDURE — 99999 PR PBB SHADOW E&M-EST. PATIENT-LVL IV: CPT | Mod: PBBFAC,,, | Performed by: INTERNAL MEDICINE

## 2020-05-12 PROCEDURE — 3008F BODY MASS INDEX DOCD: CPT | Mod: CPTII,S$GLB,, | Performed by: INTERNAL MEDICINE

## 2020-05-12 PROCEDURE — 99214 OFFICE O/P EST MOD 30 MIN: CPT | Mod: S$GLB,,, | Performed by: INTERNAL MEDICINE

## 2020-05-12 PROCEDURE — 3008F PR BODY MASS INDEX (BMI) DOCUMENTED: ICD-10-PCS | Mod: CPTII,S$GLB,, | Performed by: INTERNAL MEDICINE

## 2020-05-12 PROCEDURE — 99214 PR OFFICE/OUTPT VISIT, EST, LEVL IV, 30-39 MIN: ICD-10-PCS | Mod: S$GLB,,, | Performed by: INTERNAL MEDICINE

## 2020-05-12 NOTE — H&P (VIEW-ONLY)
"Reason visit:  Retrosternal chest discomfort, abdominal bloating    HPI:  is a 49-year-old who presents with longstanding history of retrosternal chest discomfort, bloating, belching and a feeling of food sitting in her chest and upper part of the abdomen for long time.  Occasionally she has belched consult food that she has eaten hours ago.  Denies any dysphagia or odynophagia.  Denies any johnathan heartburn.  She is currently on Nexium daily.  She also complains of abdominal distention from time to time wears she with "looked pregnant".  Denies any weight loss.  No changes in bowel pattern.  She has mild constipation for which she takes Metamucil every morning.  Last colonoscopy was 5 years and she is due for a repeat colonoscopy.  No family history of gastrointestinal malignancy.  Denies any fevers or chills.  No cough or shortness of breath.  No blood in the stool.    Past medical, surgical, social family history reviewed in epic    Medication allergies reviewed in epic    Review of systems:  Constitutional:  No fever, no chills, no weight loss, appetite is normal  Eyes:  No visual changes or red eyes  ENT:  No odynophagia or hoarseness of voice  Cardiovascular:  No angina or palpitation  Respiratory:  No shortness of breath or wheezing  Genitourinary:  No dysuria or frequency  Musculoskeletal:  No myalgias or arthralgias  Skin:  No pruritus or eczema  Neurologic:  No headache or seizures  Psychiatric:  Some anxiety but no depression    Physical exam:  Vitals see epic, awake, alert, oriented x3 in no acute distress  Neck:  Supple, no carotid bruit, no cervical adenopathy  Abdomen:  Obese, soft, non distended, nontender, no masses palpable, no hepatosplenomegaly appreciated, bowel sounds are normal, no abdominal bruits heard  Eyes:  Conjunctivae anicteric, not injected  Cardiovascular:  S1, S2 normal, no murmurs or gallops  Respiratory:  Bilateral air entry equal no rhonchi crackles  Skin:  No palmar " erythema or spider angiomata  Neurologic:  No asterixis or tremors  Psychiatric:  Appears very anxious  Lower extremities:  No pedal edema    Impression:  Retrosternal discomfort with dyspepsia and abdominal bloating.  Mild constipation.    Recommendation:  Proceed with EGD and colonoscopy.  Start Linzess 72 micro g p.o. daily.  Continue esomeprazole daily.  Check CBC, CMP, lipase.  Will consider abdominal imaging if the about investigations are unremarkable.  May consider the possibility of SIBO.

## 2020-05-13 ENCOUNTER — TELEPHONE (OUTPATIENT)
Dept: GASTROENTEROLOGY | Facility: CLINIC | Age: 50
End: 2020-05-13

## 2020-05-13 ENCOUNTER — PATIENT MESSAGE (OUTPATIENT)
Dept: GASTROENTEROLOGY | Facility: CLINIC | Age: 50
End: 2020-05-13

## 2020-05-13 DIAGNOSIS — K21.9 GASTROESOPHAGEAL REFLUX DISEASE, ESOPHAGITIS PRESENCE NOT SPECIFIED: Primary | ICD-10-CM

## 2020-05-13 DIAGNOSIS — Z12.11 SCREEN FOR COLON CANCER: Primary | ICD-10-CM

## 2020-05-13 RX ORDER — POLYETHYLENE GLYCOL 3350, SODIUM SULFATE ANHYDROUS, SODIUM BICARBONATE, SODIUM CHLORIDE, POTASSIUM CHLORIDE 236; 22.74; 6.74; 5.86; 2.97 G/4L; G/4L; G/4L; G/4L; G/4L
4 POWDER, FOR SOLUTION ORAL ONCE
Qty: 4000 ML | Refills: 0 | Status: SHIPPED | OUTPATIENT
Start: 2020-05-13 | End: 2020-05-13

## 2020-05-13 NOTE — TELEPHONE ENCOUNTER
Spoke with patient , results given as written by Dr. Morton  Pt verbalizes understadning and appreciates the call.  Thanks MA

## 2020-05-13 NOTE — TELEPHONE ENCOUNTER
----- Message from Elizabeth Bowles sent at 5/13/2020  2:33 PM CDT -----  Contact: pt   Returning call from nurse

## 2020-05-13 NOTE — TELEPHONE ENCOUNTER
----- Message from Quan Morton MD sent at 5/13/2020  7:47 AM CDT -----  Please notify patient, the blood test reveals slightly elevated liver enzymes. Discontinue Alcohol completely and keep the weight watchers program. Both should help with the liver.

## 2020-06-01 ENCOUNTER — LAB VISIT (OUTPATIENT)
Dept: INTERNAL MEDICINE | Facility: CLINIC | Age: 50
End: 2020-06-01
Payer: COMMERCIAL

## 2020-06-01 DIAGNOSIS — K21.9 GASTROESOPHAGEAL REFLUX DISEASE, ESOPHAGITIS PRESENCE NOT SPECIFIED: ICD-10-CM

## 2020-06-01 LAB — SARS-COV-2 RNA RESP QL NAA+PROBE: NOT DETECTED

## 2020-06-01 PROCEDURE — U0003 INFECTIOUS AGENT DETECTION BY NUCLEIC ACID (DNA OR RNA); SEVERE ACUTE RESPIRATORY SYNDROME CORONAVIRUS 2 (SARS-COV-2) (CORONAVIRUS DISEASE [COVID-19]), AMPLIFIED PROBE TECHNIQUE, MAKING USE OF HIGH THROUGHPUT TECHNOLOGIES AS DESCRIBED BY CMS-2020-01-R: HCPCS

## 2020-06-02 ENCOUNTER — ANESTHESIA EVENT (OUTPATIENT)
Dept: ENDOSCOPY | Facility: HOSPITAL | Age: 50
End: 2020-06-02
Payer: COMMERCIAL

## 2020-06-02 ENCOUNTER — ANESTHESIA (OUTPATIENT)
Dept: ENDOSCOPY | Facility: HOSPITAL | Age: 50
End: 2020-06-02
Payer: COMMERCIAL

## 2020-06-02 ENCOUNTER — HOSPITAL ENCOUNTER (OUTPATIENT)
Facility: HOSPITAL | Age: 50
Discharge: HOME | End: 2020-06-02
Attending: INTERNAL MEDICINE | Admitting: INTERNAL MEDICINE
Payer: COMMERCIAL

## 2020-06-02 VITALS
DIASTOLIC BLOOD PRESSURE: 57 MMHG | BODY MASS INDEX: 33.99 KG/M2 | RESPIRATION RATE: 16 BRPM | HEART RATE: 81 BPM | HEIGHT: 61 IN | OXYGEN SATURATION: 95 % | WEIGHT: 180 LBS | TEMPERATURE: 98 F | SYSTOLIC BLOOD PRESSURE: 104 MMHG

## 2020-06-02 DIAGNOSIS — R10.13 DYSPEPSIA: Primary | ICD-10-CM

## 2020-06-02 PROCEDURE — 88305 TISSUE EXAM BY PATHOLOGIST: CPT | Mod: 26,,, | Performed by: PATHOLOGY

## 2020-06-02 PROCEDURE — E9220 PRA ENDO ANESTHESIA: HCPCS | Mod: ,,, | Performed by: NURSE ANESTHETIST, CERTIFIED REGISTERED

## 2020-06-02 PROCEDURE — G0121 COLON CA SCRN NOT HI RSK IND: ICD-10-PCS | Mod: ,,, | Performed by: INTERNAL MEDICINE

## 2020-06-02 PROCEDURE — G0121 COLON CA SCRN NOT HI RSK IND: HCPCS | Mod: ,,, | Performed by: INTERNAL MEDICINE

## 2020-06-02 PROCEDURE — 25000003 PHARM REV CODE 250: Performed by: INTERNAL MEDICINE

## 2020-06-02 PROCEDURE — 63600175 PHARM REV CODE 636 W HCPCS: Performed by: NURSE ANESTHETIST, CERTIFIED REGISTERED

## 2020-06-02 PROCEDURE — G0121 COLON CA SCRN NOT HI RSK IND: HCPCS | Performed by: INTERNAL MEDICINE

## 2020-06-02 PROCEDURE — E9220 PRA ENDO ANESTHESIA: ICD-10-PCS | Mod: ,,, | Performed by: NURSE ANESTHETIST, CERTIFIED REGISTERED

## 2020-06-02 PROCEDURE — 88305 TISSUE EXAM BY PATHOLOGIST: ICD-10-PCS | Mod: 26,,, | Performed by: PATHOLOGY

## 2020-06-02 PROCEDURE — 37000008 HC ANESTHESIA 1ST 15 MINUTES: Performed by: INTERNAL MEDICINE

## 2020-06-02 PROCEDURE — 37000009 HC ANESTHESIA EA ADD 15 MINS: Performed by: INTERNAL MEDICINE

## 2020-06-02 PROCEDURE — 27201012 HC FORCEPS, HOT/COLD, DISP: Performed by: INTERNAL MEDICINE

## 2020-06-02 PROCEDURE — 43239 EGD BIOPSY SINGLE/MULTIPLE: CPT | Performed by: INTERNAL MEDICINE

## 2020-06-02 PROCEDURE — 88305 TISSUE EXAM BY PATHOLOGIST: CPT | Mod: 59 | Performed by: PATHOLOGY

## 2020-06-02 PROCEDURE — 43239 EGD BIOPSY SINGLE/MULTIPLE: CPT | Mod: 51,,, | Performed by: INTERNAL MEDICINE

## 2020-06-02 PROCEDURE — 43239 PR EGD, FLEX, W/BIOPSY, SGL/MULTI: ICD-10-PCS | Mod: 51,,, | Performed by: INTERNAL MEDICINE

## 2020-06-02 RX ORDER — LIDOCAINE HCL/PF 100 MG/5ML
SYRINGE (ML) INTRAVENOUS
Status: DISCONTINUED | OUTPATIENT
Start: 2020-06-02 | End: 2020-06-02

## 2020-06-02 RX ORDER — PROPOFOL 10 MG/ML
VIAL (ML) INTRAVENOUS CONTINUOUS PRN
Status: DISCONTINUED | OUTPATIENT
Start: 2020-06-02 | End: 2020-06-02

## 2020-06-02 RX ORDER — PROPOFOL 10 MG/ML
VIAL (ML) INTRAVENOUS
Status: DISCONTINUED | OUTPATIENT
Start: 2020-06-02 | End: 2020-06-02

## 2020-06-02 RX ORDER — SODIUM CHLORIDE 9 MG/ML
INJECTION, SOLUTION INTRAVENOUS CONTINUOUS
Status: DISCONTINUED | OUTPATIENT
Start: 2020-06-02 | End: 2020-06-02 | Stop reason: HOSPADM

## 2020-06-02 RX ADMIN — Medication 100 MG: at 09:06

## 2020-06-02 RX ADMIN — PROPOFOL 50 MG: 10 INJECTION, EMULSION INTRAVENOUS at 09:06

## 2020-06-02 RX ADMIN — PROPOFOL 150 MCG/KG/MIN: 10 INJECTION, EMULSION INTRAVENOUS at 09:06

## 2020-06-02 RX ADMIN — SODIUM CHLORIDE: 0.9 INJECTION, SOLUTION INTRAVENOUS at 09:06

## 2020-06-02 NOTE — INTERVAL H&P NOTE
The patient has been examined and the H&P has been reviewed:    There is no interval changes since last encounter.    EGD/Colonoscopy: Dyspepsia and Screening for CRC  ASA: Per anesthesia  Mallampati: Per anesthesia  Sedation: [place]    Endoscopy risks, benefits and alternative options discussed and understood by patient/family.          Active Hospital Problems    Diagnosis  POA    Dyspepsia [R10.13]  Yes      Resolved Hospital Problems   No resolved problems to display.

## 2020-06-02 NOTE — DISCHARGE INSTRUCTIONS

## 2020-06-02 NOTE — [employer] PATIENT INSTRUCTIONS
Discharge Summary/Instructions after an Endoscopic Procedure  Patient Name: Isela Graf  Patient MRN: [id number]  Patient YOB: 1970 Tuesday, June 2, 2020  Quan Morton MD  RESTRICTIONS:  During your procedure today, you received medications for sedation.  These   medications may affect your judgment, balance and coordination.  Therefore,   for 24 hours, you have the following restrictions:   - DO NOT drive a car, operate machinery, make legal/financial decisions,   sign important papers or drink alcohol.    ACTIVITY:  Today: no heavy lifting, straining or running due to procedural   sedation/anesthesia.  The following day: return to full activity including work.  DIET:  Eat and drink normally unless instructed otherwise.     TREATMENT FOR COMMON SIDE EFFECTS:  - Mild abdominal pain, nausea, belching, bloating or excessive gas:  rest,   eat lightly and use a heating pad.  - Sore Throat: treat with throat lozenges and/or gargle with warm salt   water.  - Because air was used during the procedure, expelling large amounts of air   from your rectum or belching is normal.  - If a bowel prep was taken, you may not have a bowel movement for 1-3 days.    This is normal.  SYMPTOMS TO WATCH FOR AND REPORT TO YOUR PHYSICIAN:  1. Abdominal pain or bloating, other than gas cramps.  2. Chest pain.  3. Back pain.  4. Signs of infection such as: chills or fever occurring within 24 hours   after the procedure.  5. Rectal bleeding, which would show as bright red, maroon, or black stools.   (A tablespoon of blood from the rectum is not serious, especially if   hemorrhoids are present.)  6. Vomiting.  7. Weakness or dizziness.  GO DIRECTLY TO THE NEAREST EMERGENCY ROOM IF YOU HAVE ANY OF THE FOLLOWING:      Difficulty breathing              Chills and/or fever over 101 F   Persistent vomiting and/or vomiting blood   Severe abdominal pain   Severe chest pain   Black, tarry stools   Bleeding- more than one  tablespoon   Any other symptom or condition that you feel may need urgent attention  Your doctor recommends these additional instructions:  If any biopsies were taken, your doctors clinic will contact you in 1 to 2   weeks with any results.  - Patient has a contact number available for emergencies.  The signs and   symptoms of potential delayed complications were discussed with the   patient.  Return to normal activities tomorrow.  Written discharge   instructions were provided to the patient.   - Discharge patient to home.   - Resume previous diet.   - Continue present medications.   - Repeat colonoscopy in 10 years for screening purposes.   For questions, problems or results please call your physician - Quan Morton MD at Work:  [phone].  [facility] [place], EMERGENCY ROOM PHONE NUMBER: [phone]  IF A COMPLICATION OR EMERGENCY SITUATION ARISES AND YOU ARE UNABLE TO REACH   YOUR PHYSICIAN - GO DIRECTLY TO THE EMERGENCY ROOM.  Quan Morton MD  6/2/2020 10:25:22 AM  This report has been verified and signed electronically.  [employer]

## 2020-06-02 NOTE — TRANSFER OF CARE
"Anesthesia Transfer of Care Note    Patient: Isela Graf    Procedure(s) Performed: Procedure(s) (LRB):  EGD (ESOPHAGOGASTRODUODENOSCOPY) (N/A)  COLONOSCOPY (N/A)    Patient location: GI    Anesthesia Type: general    Transport from OR: Transported from OR on room air with adequate spontaneous ventilation    Post pain: adequate analgesia    Post assessment: no apparent anesthetic complications and tolerated procedure well    Post vital signs: stable    Level of consciousness: awake    Nausea/Vomiting: no nausea/vomiting    Complications: none    Transfer of care protocol was followed      Last vitals:   Visit Vitals  BP (!) 107/57 (BP Location: Left arm, Patient Position: Lying)   Pulse 84   Temp 36.7 °C (98.1 °F)   Resp 16   Ht 5' 1" (1.549 m)   Wt 81.6 kg (180 lb)   LMP 10/12/2018 (Approximate)   SpO2 (!) 94%   Breastfeeding? No   BMI 34.01 kg/m²     "

## 2020-06-02 NOTE — PROVATION PATIENT INSTRUCTIONS
Discharge Summary/Instructions after an Endoscopic Procedure  Patient Name: Isela Graf  Patient MRN: [id number]  Patient YOB: 1970 Tuesday, June 2, 2020  Quan Morton MD  RESTRICTIONS:  During your procedure today, you received medications for sedation.  These   medications may affect your judgment, balance and coordination.  Therefore,   for 24 hours, you have the following restrictions:   - DO NOT drive a car, operate machinery, make legal/financial decisions,   sign important papers or drink alcohol.    ACTIVITY:  Today: no heavy lifting, straining or running due to procedural   sedation/anesthesia.  The following day: return to full activity including work.  DIET:  Eat and drink normally unless instructed otherwise.     TREATMENT FOR COMMON SIDE EFFECTS:  - Mild abdominal pain, nausea, belching, bloating or excessive gas:  rest,   eat lightly and use a heating pad.  - Sore Throat: treat with throat lozenges and/or gargle with warm salt   water.  - Because air was used during the procedure, expelling large amounts of air   from your rectum or belching is normal.  - If a bowel prep was taken, you may not have a bowel movement for 1-3 days.    This is normal.  SYMPTOMS TO WATCH FOR AND REPORT TO YOUR PHYSICIAN:  1. Abdominal pain or bloating, other than gas cramps.  2. Chest pain.  3. Back pain.  4. Signs of infection such as: chills or fever occurring within 24 hours   after the procedure.  5. Rectal bleeding, which would show as bright red, maroon, or black stools.   (A tablespoon of blood from the rectum is not serious, especially if   hemorrhoids are present.)  6. Vomiting.  7. Weakness or dizziness.  GO DIRECTLY TO THE NEAREST EMERGENCY ROOM IF YOU HAVE ANY OF THE FOLLOWING:      Difficulty breathing              Chills and/or fever over 101 F   Persistent vomiting and/or vomiting blood   Severe abdominal pain   Severe chest pain   Black, tarry stools   Bleeding- more than one  tablespoon   Any other symptom or condition that you feel may need urgent attention  Your doctor recommends these additional instructions:  If any biopsies were taken, your doctors clinic will contact you in 1 to 2   weeks with any results.  - Patient has a contact number available for emergencies.  The signs and   symptoms of potential delayed complications were discussed with the   patient.  Return to normal activities tomorrow.  Written discharge   instructions were provided to the patient.   - Discharge patient to home.   - Resume previous diet.   - Continue present medications.   - Await pathology results.   For questions, problems or results please call your physician - Quan Morton MD at Work:  [phone].  [facility], EMERGENCY ROOM PHONE NUMBER: [phone]  IF A COMPLICATION OR EMERGENCY SITUATION ARISES AND YOU ARE UNABLE TO REACH   YOUR PHYSICIAN - GO DIRECTLY TO THE EMERGENCY ROOM.  Quan Morton MD  6/2/2020 10:03:17 AM  This report has been verified and signed electronically.  [employer]

## 2020-06-02 NOTE — ANESTHESIA POSTPROCEDURE EVALUATION
Anesthesia Post Evaluation    Patient: Isela Graf    Procedure(s) Performed: Procedure(s) (LRB):  EGD (ESOPHAGOGASTRODUODENOSCOPY) (N/A)  COLONOSCOPY (N/A)    Final Anesthesia Type: general    Patient location during evaluation: PACU  Patient participation: Yes- Able to Participate  Level of consciousness: awake and alert  Post-procedure vital signs: reviewed and stable  Pain management: adequate  Airway patency: patent    PONV status at discharge: No PONV  Anesthetic complications: no      Cardiovascular status: blood pressure returned to baseline and stable  Respiratory status: unassisted  Hydration status: euvolemic  Follow-up not needed.          Vitals Value Taken Time   /57 6/2/2020 11:06 AM   Temp 36.7 °C (98.1 °F) 6/2/2020 10:31 AM   Pulse 81 6/2/2020 11:06 AM   Resp 16 6/2/2020 11:06 AM   SpO2 95 % 6/2/2020 11:06 AM         Event Time     Out of Recovery 11:07:43          Pain/Susanne Score: Susanne Score: 10 (6/2/2020 10:49 AM)

## 2020-06-02 NOTE — ANESTHESIA PREPROCEDURE EVALUATION
06/02/2020  Isela Graf is a 49 y.o., female.  Past Medical History:   Diagnosis Date    Allergy     Anxiety     Depression     Hyperlipidemia     Melanoma 1/26/2015    scalp excised by Dr Aldridge    Melanoma of scalp 1/12/2015    Open wound of scalp, complicated 2/6/2015       Past Surgical History:   Procedure Laterality Date    APPENDECTOMY      EYE SURGERY      GALLBLADDER SURGERY  02/2017    Scalp Reconstruction      yin-yang flaps    SKIN SURGERY      removal of cancer    TONSILLECTOMY      WLE scalp melanoma  1/26/2015       Family History   Problem Relation Age of Onset    Hyperlipidemia Mother     Heart attack Mother     Hyperlipidemia Father     Cancer Father     Heart disease Father     Cancer Maternal Grandmother         BCC    Breast cancer Paternal Grandmother 50    Heart attack Paternal Grandmother     Heart disease Paternal Grandmother     Melanoma Neg Hx     Colon cancer Neg Hx     Ovarian cancer Neg Hx        Social History     Socioeconomic History    Marital status:      Spouse name: Not on file    Number of children: Not on file    Years of education: Not on file    Highest education level: Not on file   Occupational History    Not on file   Social Needs    Financial resource strain: Not on file    Food insecurity:     Worry: Not on file     Inability: Not on file    Transportation needs:     Medical: Not on file     Non-medical: Not on file   Tobacco Use    Smoking status: Never Smoker    Smokeless tobacco: Never Used   Substance and Sexual Activity    Alcohol use: Yes     Alcohol/week: 1.0 standard drinks     Types: 1 Glasses of wine per week     Comment: nightly    Drug use: No    Sexual activity: Yes     Partners: Male     Birth control/protection: None   Lifestyle    Physical activity:     Days per week: Not on file     Minutes per  session: Not on file    Stress: Not at all   Relationships    Social connections:     Talks on phone: Not on file     Gets together: Not on file     Attends Catholic service: Not on file     Active member of club or organization: Not on file     Attends meetings of clubs or organizations: Not on file     Relationship status: Not on file   Other Topics Concern    Are you pregnant or think you may be? No    Breast-feeding Not Asked   Social History Narrative    Works at a day care       Current Facility-Administered Medications   Medication Dose Route Frequency Provider Last Rate Last Dose    0.9%  NaCl infusion   Intravenous Continuous Quan Morton MD           Review of patient's allergies indicates:   Allergen Reactions    Sulfa (sulfonamide antibiotics) Other (See Comments)     Stomach pain   Results for WAI SANCHEZ (MRN [id number]) as of 6/2/2020 09:27   Ref. Range 5/12/2020 15:14   WBC Latest Ref Range: 3.90 - 12.70 K/uL 10.78   RBC Latest Ref Range: 4.00 - 5.40 M/uL 4.61   Hemoglobin Latest Ref Range: 12.0 - 16.0 g/dL 13.5   Hematocrit Latest Ref Range: 37.0 - 48.5 % 42.5   MCV Latest Ref Range: 82 - 98 fL 92   MCH Latest Ref Range: 27.0 - 31.0 pg 29.3   MCHC Latest Ref Range: 32.0 - 36.0 g/dL 31.8 (L)   RDW Latest Ref Range: 11.5 - 14.5 % 14.0   Platelets Latest Ref Range: 150 - 350 K/uL 304   MPV Latest Ref Range: 9.2 - 12.9 fL 9.9   Gran% Latest Ref Range: 38.0 - 73.0 % 66.3   Gran # (ANC) Latest Ref Range: 1.8 - 7.7 K/uL 7.1   Lymph% Latest Ref Range: 18.0 - 48.0 % 23.1   Lymph # Latest Ref Range: 1.0 - 4.8 K/uL 2.5   Mono% Latest Ref Range: 4.0 - 15.0 % 7.5   Mono # Latest Ref Range: 0.3 - 1.0 K/uL 0.8   Eosinophil% Latest Ref Range: 0.0 - 8.0 % 1.9   Eos # Latest Ref Range: 0.0 - 0.5 K/uL 0.2   Basophil% Latest Ref Range: 0.0 - 1.9 % 0.6   Baso # Latest Ref Range: 0.00 - 0.20 K/uL 0.07   nRBC Latest Ref Range: 0 /100 WBC 0   Differential Method Unknown Automated   Immature Grans (Abs) Latest  Ref Range: 0.00 - 0.04 K/uL 0.06 (H)   Immature Granulocytes Latest Ref Range: 0.0 - 0.5 % 0.6 (H)   Sodium Latest Ref Range: 136 - 145 mmol/L 141   Potassium Latest Ref Range: 3.5 - 5.1 mmol/L 4.0   Chloride Latest Ref Range: 95 - 110 mmol/L 103   CO2 Latest Ref Range: 23 - 29 mmol/L 26   Anion Gap Latest Ref Range: 8 - 16 mmol/L 12   BUN, Bld Latest Ref Range: 6 - 20 mg/dL 14   Creatinine Latest Ref Range: 0.5 - 1.4 mg/dL 0.8   eGFR if non African American Latest Ref Range: >60 mL/min/1.73 m^2 >60.0   eGFR if African American Latest Ref Range: >60 mL/min/1.73 m^2 >60.0   Glucose Latest Ref Range: 70 - 110 mg/dL 123 (H)   Calcium Latest Ref Range: 8.7 - 10.5 mg/dL 9.6   Alkaline Phosphatase Latest Ref Range: 55 - 135 U/L 125   PROTEIN TOTAL Latest Ref Range: 6.0 - 8.4 g/dL 8.4   Albumin Latest Ref Range: 3.5 - 5.2 g/dL 4.3   BILIRUBIN TOTAL Latest Ref Range: 0.1 - 1.0 mg/dL 0.4   AST Latest Ref Range: 10 - 40 U/L 45 (H)   ALT Latest Ref Range: 10 - 44 U/L 50 (H)   Lipase Latest Ref Range: 4 - 60 U/L 30       Anesthesia Evaluation    I have reviewed the Patient Summary Reports.    I have reviewed the Nursing Notes.    I have reviewed the Medications.     Review of Systems  Anesthesia Hx:  No problems with previous Anesthesia  History of prior surgery of interest to airway management or planning: Previous anesthesia: General Denies Family Hx of Anesthesia complications.   Denies Personal Hx of Anesthesia complications.   Social:  Non-Smoker, Alcohol Use    Hematology/Oncology:  Hematology Normal   Oncology Normal     EENT/Dental:EENT/Dental Normal   Cardiovascular:   Exercise tolerance: good hyperlipidemia    Pulmonary:   Asthma    Renal/:  Renal/ Normal     Hepatic/GI:  Hepatic/GI Normal Bowel Prep.    Musculoskeletal:  Musculoskeletal Normal    Neurological:  Neurology Normal    Endocrine:  Endocrine Normal    Dermatological:  Skin Normal    Psych:   anxiety depression          Physical Exam  General:  Well  nourished, Obesity    Airway/Jaw/Neck:  Airway Findings: Mouth Opening: Normal Tongue: Normal  General Airway Assessment: Adult  Mallampati: II  Improves to II with phonation.  TM Distance: Normal, at least 6 cm        Eyes/Ears/Nose:  EYES/EARS/NOSE FINDINGS: Normal   Dental:  DENTAL FINDINGS: Normal   Chest/Lungs:  Chest/Lungs Clear    Heart/Vascular:  Heart Findings: Normal Heart murmur: negative Vascular Findings: Normal    Abdomen:  Abdomen Findings: Normal    Musculoskeletal:  Musculoskeletal Findings: Normal   Skin:  Skin Findings: Normal    Mental Status:  Mental Status Findings: Normal        Anesthesia Plan  Type of Anesthesia, risks & benefits discussed:  Anesthesia Type:  general  Patient's Preference: General  Intra-op Monitoring Plan: standard ASA monitors  Intra-op Monitoring Plan Comments:   Post Op Pain Control Plan: multimodal analgesia  Post Op Pain Control Plan Comments:   Induction:   IV  Beta Blocker:  Patient is not currently on a Beta-Blocker (No further documentation required).       Informed Consent: Patient understands risks and agrees with Anesthesia plan.  Questions answered. Anesthesia consent signed with patient.  ASA Score: 2     Day of Surgery Review of History & Physical: I have interviewed and examined the patient. I have reviewed the patient's H&P dated:  There are no significant changes.  H&P update referred to the provider.         Ready For Surgery From Anesthesia Perspective.

## 2020-06-04 LAB
COMMENT: NORMAL
FINAL PATHOLOGIC DIAGNOSIS: NORMAL
GROSS: NORMAL

## 2020-06-05 ENCOUNTER — TELEPHONE (OUTPATIENT)
Dept: GASTROENTEROLOGY | Facility: CLINIC | Age: 50
End: 2020-06-05

## 2020-06-05 NOTE — TELEPHONE ENCOUNTER
Ma called pt to release test results   No answer message left on pt voicemail   Requesting a call back    Results sent to pt via portal also

## 2020-06-05 NOTE — TELEPHONE ENCOUNTER
----- Message from Anabel Perez sent at 6/5/2020  3:09 PM CDT -----  Contact: pt   Pt calling for lab results     Please call [phone]     Thanks

## 2020-06-25 NOTE — PROGRESS NOTES
Isela Graf is a 50 y.o. female  who presents for annual exam.  She is perimenopausal with period 2020.  Then, starting 2020, she had light bleeding for 8 days.  Reports occasional hot flashes.  Requests pap.  Mammogram scheduled for today.   Patient's last menstrual period was 10/12/2018 (approximate).    Pap 19: Negative, HPV Negative      Past Medical History:   Diagnosis Date    Allergy     Anxiety     Depression     Hyperlipidemia     Melanoma 2015    scalp excised by Dr Aldridge    Melanoma of scalp 2015    Open wound of scalp, complicated 2015       Past Surgical History:   Procedure Laterality Date    APPENDECTOMY      CHOLECYSTECTOMY      COLONOSCOPY N/A 2020    Procedure: COLONOSCOPY;  Surgeon: Quan Morton MD;  Location: [facility] (4TH FLR);  Service: Endoscopy;  Laterality: N/A;   - pt aware if drop off location and visitor policy -   COVID screening scheduled for 20 at Primary Care -     ESOPHAGOGASTRODUODENOSCOPY N/A 2020    Procedure: EGD (ESOPHAGOGASTRODUODENOSCOPY);  Surgeon: Quan Morton MD;  [facility]  Service: Endoscopy;  Laterality: N/A;    EYE SURGERY      GALLBLADDER SURGERY  2017    Scalp Reconstruction      yin-yang flaps    SKIN SURGERY      removal of cancer    TONSILLECTOMY      WLE scalp melanoma  2015       OB History        5    Para   0    Term   0            AB   3    Living   2       SAB   3    TAB        Ectopic        Multiple        Live Births                     ROS:  GENERAL: Feeling well overall.   SKIN: Denies rash or lesions.   HEAD: Denies head injury or headache.   NODES: Denies enlarged lymph nodes.   CHEST: Denies chest pain or shortness of breath.   CARDIOVASCULAR: Denies palpitations or left sided chest pain.   ABDOMEN: Reports having IBS.  URINARY: No dysuria or hematuria.  REPRODUCTIVE: See HPI.   BREASTS: Denies pain, lumps, or nipple  discharge.   HEMATOLOGIC: No easy bruisability or excessive bleeding.   MUSCULOSKELETAL: Denies joint pain or swelling.   NEUROLOGIC: Denies syncope or weakness.   PSYCHIATRIC: Denies depression.    PE:   (chaperone present during entire exam)  APPEARANCE: Well nourished, well developed, in no acute distress.  BREASTS: Symmetrical, no skin changes or visible lesions. No palpable masses, nipple discharge or adenopathy bilaterally.  ABDOMEN: Soft. No tenderness or masses. No CVA tenderness.  VULVA: No lesions. Normal female genitalia.  URETHRAL MEATUS: Normal size and location, no lesions, no prolapse.  URETHRA: No masses, tenderness, prolapse or scarring.  VAGINA: No lesions, no abnormal discharge, no significant cystocele or rectocele.  CERVIX: No lesions and discharge. No CMT.  UTERUS: Normal size, regular shape, mobile, non-tender, bladder base nontender.  ADNEXA: No masses, tenderness or CDS nodularity.  ANUS PERINEUM: Normal.      Diagnosis:  1. Well woman exam with routine gynecological exam    2. Perimenopausal    3. Encounter for screening mammogram for breast cancer    4. Pap smear for cervical cancer screening          PLAN:    Orders Placed This Encounter    HPV High Risk Genotypes, PCR    US Pelvis Comp with Transvag NON-OB (xpd    Liquid-Based Pap Smear, Screening       Patient was counseled today on perimenopausal issues and expected bleeding patterns.  She was nearly one year from her last period when she had another episode of bleeding for 8 days.  She will have a pelvic ultrasound performed for evaluation of her endometrium.  Mammogram scheduled for later today.    Follow-up in 1 year.

## 2020-06-26 ENCOUNTER — OFFICE VISIT (OUTPATIENT)
Dept: OBSTETRICS AND GYNECOLOGY | Facility: CLINIC | Age: 50
End: 2020-06-26
Attending: OBSTETRICS & GYNECOLOGY
Payer: COMMERCIAL

## 2020-06-26 ENCOUNTER — HOSPITAL ENCOUNTER (OUTPATIENT)
Dept: RADIOLOGY | Facility: OTHER | Age: 50
Discharge: HOME | End: 2020-06-26
Attending: OBSTETRICS & GYNECOLOGY
Payer: COMMERCIAL

## 2020-06-26 ENCOUNTER — PATIENT MESSAGE (OUTPATIENT)
Dept: OBSTETRICS AND GYNECOLOGY | Facility: CLINIC | Age: 50
End: 2020-06-26

## 2020-06-26 ENCOUNTER — TELEPHONE (OUTPATIENT)
Dept: OBSTETRICS AND GYNECOLOGY | Facility: CLINIC | Age: 50
End: 2020-06-26

## 2020-06-26 VITALS
SYSTOLIC BLOOD PRESSURE: 124 MMHG | WEIGHT: 186.06 LBS | BODY MASS INDEX: 35.16 KG/M2 | DIASTOLIC BLOOD PRESSURE: 80 MMHG

## 2020-06-26 DIAGNOSIS — Z12.31 ENCOUNTER FOR SCREENING MAMMOGRAM FOR BREAST CANCER: ICD-10-CM

## 2020-06-26 DIAGNOSIS — N95.1 PERIMENOPAUSAL: ICD-10-CM

## 2020-06-26 DIAGNOSIS — Z12.4 PAP SMEAR FOR CERVICAL CANCER SCREENING: ICD-10-CM

## 2020-06-26 DIAGNOSIS — Z01.419 WELL WOMAN EXAM WITH ROUTINE GYNECOLOGICAL EXAM: Primary | ICD-10-CM

## 2020-06-26 PROCEDURE — 99396 PR PREVENTIVE VISIT,EST,40-64: ICD-10-PCS | Mod: S$GLB,,, | Performed by: OBSTETRICS & GYNECOLOGY

## 2020-06-26 PROCEDURE — 99999 PR PBB SHADOW E&M-EST. PATIENT-LVL III: CPT | Mod: PBBFAC,,, | Performed by: OBSTETRICS & GYNECOLOGY

## 2020-06-26 PROCEDURE — 76830 TRANSVAGINAL US NON-OB: CPT | Mod: TC

## 2020-06-26 PROCEDURE — 99999 PR PBB SHADOW E&M-EST. PATIENT-LVL III: ICD-10-PCS | Mod: PBBFAC,,, | Performed by: OBSTETRICS & GYNECOLOGY

## 2020-06-26 PROCEDURE — 99396 PREV VISIT EST AGE 40-64: CPT | Mod: S$GLB,,, | Performed by: OBSTETRICS & GYNECOLOGY

## 2020-06-26 PROCEDURE — 76856 US EXAM PELVIC COMPLETE: CPT | Mod: 26,,, | Performed by: RADIOLOGY

## 2020-06-26 PROCEDURE — 77063 BREAST TOMOSYNTHESIS BI: CPT | Mod: 26,,, | Performed by: RADIOLOGY

## 2020-06-26 PROCEDURE — 88175 CYTOPATH C/V AUTO FLUID REDO: CPT

## 2020-06-26 PROCEDURE — 87624 HPV HI-RISK TYP POOLED RSLT: CPT

## 2020-06-26 PROCEDURE — 77063 MAMMO DIGITAL SCREENING BILAT WITH TOMOSYNTHESIS_CAD: ICD-10-PCS | Mod: 26,,, | Performed by: RADIOLOGY

## 2020-06-26 PROCEDURE — 76830 US PELVIS COMP WITH TRANSVAG NON-OB (XPD): ICD-10-PCS | Mod: 26,,, | Performed by: RADIOLOGY

## 2020-06-26 PROCEDURE — 76856 US PELVIS COMP WITH TRANSVAG NON-OB (XPD): ICD-10-PCS | Mod: 26,,, | Performed by: RADIOLOGY

## 2020-06-26 PROCEDURE — 77067 MAMMO DIGITAL SCREENING BILAT WITH TOMOSYNTHESIS_CAD: ICD-10-PCS | Mod: 26,,, | Performed by: RADIOLOGY

## 2020-06-26 PROCEDURE — 76830 TRANSVAGINAL US NON-OB: CPT | Mod: 26,,, | Performed by: RADIOLOGY

## 2020-06-26 PROCEDURE — 77067 SCR MAMMO BI INCL CAD: CPT | Mod: TC

## 2020-06-26 PROCEDURE — 77067 SCR MAMMO BI INCL CAD: CPT | Mod: 26,,, | Performed by: RADIOLOGY

## 2020-07-05 LAB
FINAL PATHOLOGIC DIAGNOSIS: NORMAL
Lab: NORMAL

## 2020-07-06 ENCOUNTER — PATIENT MESSAGE (OUTPATIENT)
Dept: OBSTETRICS AND GYNECOLOGY | Facility: CLINIC | Age: 50
End: 2020-07-06

## 2020-07-08 ENCOUNTER — TELEPHONE (OUTPATIENT)
Dept: OBSTETRICS AND GYNECOLOGY | Facility: CLINIC | Age: 50
End: 2020-07-08

## 2020-07-08 LAB
HPV HR 12 DNA SPEC QL NAA+PROBE: NEGATIVE
HPV16 AG SPEC QL: NEGATIVE
HPV18 DNA SPEC QL NAA+PROBE: NEGATIVE

## 2020-07-08 NOTE — TELEPHONE ENCOUNTER
----- Message from Doris Rice sent at 7/8/2020  1:40 PM CDT -----  Type:  Patient Returning Call    Who Called: WAI SANCHEZ [[id number]]    Who Left Message for Patient: Dr. Ochoa    Does the patient know what this is regarding?: yes    Best Call Back Number: [phone]     Additional Information:  Patient would like to receive a call back after 3pm.

## 2020-07-09 NOTE — TELEPHONE ENCOUNTER
----- Message from Kath Carrizales sent at 7/9/2020  9:27 AM CDT -----  Patient is returning your call    Patient can be reached at [phone]

## 2020-07-09 NOTE — TELEPHONE ENCOUNTER
Called patient:    LMP 6/12/20 with prior period 7/2019.    She has not yet been one year without a period as is therefore not yet postmenopausal.    Dicussed results of pelvic sono:       FINDINGS:  Uterus:  Size: 8.6 x 5.2 x 4.6 cm  Masses: Small posterior fibroid 1.1 cm  Endometrium: Normal in this pre menopausal patient, measuring 7 mm.  Right ovary:  Size: 2.4 x 1.2 x 2.3 cm  Appearance: Normal  Vascular flow: Normal.  Left ovary:  Size: 2.1 x 1.2 x 1.8 cm  Appearance: Normal  Vascular Flow: Normal.  Free Fluid:  None.  Impression:  Small uterine fibroid identified.  7 mm endometrial stripe.  This would be slightly thickened for a postmenopausal patient.  Further evaluation as clinically indicated.      We reviewed expected perimenopausal bleeding patterns.    She understands the need to contact us for any abnormal bleeding.

## 2020-10-20 RX ORDER — ALPRAZOLAM 0.5 MG/1
0.5 TABLET ORAL 2 TIMES DAILY PRN
Qty: 30 TABLET | Refills: 0 | OUTPATIENT
Start: 2020-10-20 | End: 2020-11-19

## 2020-10-22 ENCOUNTER — PATIENT MESSAGE (OUTPATIENT)
Dept: FAMILY MEDICINE | Facility: CLINIC | Age: 50
End: 2020-10-22

## 2020-11-11 RX ORDER — ALBUTEROL SULFATE 90 UG/1
AEROSOL, METERED RESPIRATORY (INHALATION)
Qty: 17 G | Refills: 4 | Status: SHIPPED | OUTPATIENT
Start: 2020-11-11 | End: 2022-04-17

## 2020-11-12 NOTE — TELEPHONE ENCOUNTER
Patient has been scheduled      ----- Message from Aries Tanner sent at 11/12/2020  2:22 PM CST -----  Contact: Patient  The pt called and would like to schedule an appt for  an annual, a medication refill, and a flu shot    She can be reached at [phone]

## 2020-11-16 ENCOUNTER — IMMUNIZATION (OUTPATIENT)
Dept: FAMILY MEDICINE | Facility: CLINIC | Age: 50
End: 2020-11-16
Payer: COMMERCIAL

## 2020-11-16 DIAGNOSIS — Z23 NEED FOR INFLUENZA VACCINATION: Primary | ICD-10-CM

## 2020-11-16 PROCEDURE — 90471 IMMUNIZATION ADMIN: CPT | Mod: S$GLB,,, | Performed by: FAMILY MEDICINE

## 2020-11-16 PROCEDURE — 90686 FLU VACCINE (QUAD) GREATER THAN OR EQUAL TO 3YO PRESERVATIVE FREE IM: ICD-10-PCS | Mod: S$GLB,,, | Performed by: FAMILY MEDICINE

## 2020-11-16 PROCEDURE — 90686 IIV4 VACC NO PRSV 0.5 ML IM: CPT | Mod: S$GLB,,, | Performed by: FAMILY MEDICINE

## 2020-11-16 PROCEDURE — 90471 FLU VACCINE (QUAD) GREATER THAN OR EQUAL TO 3YO PRESERVATIVE FREE IM: ICD-10-PCS | Mod: S$GLB,,, | Performed by: FAMILY MEDICINE

## 2020-12-01 ENCOUNTER — TELEPHONE (OUTPATIENT)
Dept: FAMILY MEDICINE | Facility: CLINIC | Age: 50
End: 2020-12-01

## 2020-12-01 DIAGNOSIS — Z00.00 WELLNESS EXAMINATION: ICD-10-CM

## 2020-12-01 DIAGNOSIS — R79.0 LOW FERRITIN: Primary | ICD-10-CM

## 2020-12-01 NOTE — TELEPHONE ENCOUNTER
----- Message from Errol Felton sent at 12/1/2020 11:41 AM CST -----  Contact: patient//  [phone]  Isela Graf calling requesting to speak with the nurse regarding rescheduling her appointment   Please advise

## 2020-12-31 ENCOUNTER — LAB VISIT (OUTPATIENT)
Dept: LAB | Facility: HOSPITAL | Age: 50
End: 2020-12-31
Attending: FAMILY MEDICINE
Payer: COMMERCIAL

## 2020-12-31 DIAGNOSIS — Z00.00 WELLNESS EXAMINATION: ICD-10-CM

## 2020-12-31 DIAGNOSIS — R79.0 LOW FERRITIN: ICD-10-CM

## 2020-12-31 LAB
25(OH)D3+25(OH)D2 SERPL-MCNC: 41 NG/ML (ref 30–96)
ALBUMIN SERPL BCP-MCNC: 4.8 G/DL (ref 3.5–5.2)
ALP SERPL-CCNC: 115 U/L (ref 38–126)
ALT SERPL W/O P-5'-P-CCNC: 71 U/L (ref 10–44)
ANION GAP SERPL CALC-SCNC: 11 MMOL/L (ref 8–16)
AST SERPL-CCNC: 67 U/L (ref 15–46)
BASOPHILS # BLD AUTO: 0.05 K/UL (ref 0–0.2)
BASOPHILS NFR BLD: 0.7 % (ref 0–1.9)
BILIRUB SERPL-MCNC: 0.4 MG/DL (ref 0.1–1)
CALCIUM SERPL-MCNC: 9.9 MG/DL (ref 8.7–10.5)
CHLORIDE SERPL-SCNC: 102 MMOL/L (ref 95–110)
CHOLEST SERPL-MCNC: 235 MG/DL (ref 120–199)
CHOLEST/HDLC SERPL: 4.1 {RATIO} (ref 2–5)
CO2 SERPL-SCNC: 28 MMOL/L (ref 23–29)
CREAT SERPL-MCNC: 0.59 MG/DL (ref 0.5–1.4)
DIFFERENTIAL METHOD: NORMAL
EOSINOPHIL # BLD AUTO: 0.2 K/UL (ref 0–0.5)
EOSINOPHIL NFR BLD: 2.3 % (ref 0–8)
ERYTHROCYTE [DISTWIDTH] IN BLOOD BY AUTOMATED COUNT: 12.8 % (ref 11.5–14.5)
EST. GFR  (AFRICAN AMERICAN): >60 ML/MIN/1.73 M^2
EST. GFR  (NON AFRICAN AMERICAN): >60 ML/MIN/1.73 M^2
FERRITIN SERPL-MCNC: 91 NG/ML (ref 20–300)
GLUCOSE SERPL-MCNC: 115 MG/DL (ref 70–110)
HCT VFR BLD AUTO: 42.1 % (ref 37–48.5)
HDLC SERPL-MCNC: 57 MG/DL (ref 40–75)
HDLC SERPL: 24.3 % (ref 20–50)
HGB BLD-MCNC: 13.6 G/DL (ref 12–16)
IMM GRANULOCYTES # BLD AUTO: 0.02 K/UL (ref 0–0.04)
IMM GRANULOCYTES NFR BLD AUTO: 0.3 % (ref 0–0.5)
IRON SERPL-MCNC: 57 UG/DL (ref 30–160)
LDLC SERPL CALC-MCNC: 114.4 MG/DL (ref 63–159)
LYMPHOCYTES # BLD AUTO: 2.8 K/UL (ref 1–4.8)
LYMPHOCYTES NFR BLD: 37.7 % (ref 18–48)
MCH RBC QN AUTO: 29.6 PG (ref 27–31)
MCHC RBC AUTO-ENTMCNC: 32.3 G/DL (ref 32–36)
MCV RBC AUTO: 92 FL (ref 82–98)
MONOCYTES # BLD AUTO: 0.6 K/UL (ref 0.3–1)
MONOCYTES NFR BLD: 8.5 % (ref 4–15)
NEUTROPHILS # BLD AUTO: 3.8 K/UL (ref 1.8–7.7)
NEUTROPHILS NFR BLD: 50.5 % (ref 38–73)
NONHDLC SERPL-MCNC: 178 MG/DL
NRBC BLD-RTO: 0 /100 WBC
PLATELET # BLD AUTO: 276 K/UL (ref 150–350)
PMV BLD AUTO: 10.1 FL (ref 9.2–12.9)
POTASSIUM SERPL-SCNC: 4.7 MMOL/L (ref 3.5–5.1)
PROT SERPL-MCNC: 8.5 G/DL (ref 6–8.4)
RBC # BLD AUTO: 4.59 M/UL (ref 4–5.4)
SODIUM SERPL-SCNC: 141 MMOL/L (ref 136–145)
TRIGL SERPL-MCNC: 318 MG/DL (ref 30–150)
TSH SERPL DL<=0.005 MIU/L-ACNC: 2.09 UIU/ML (ref 0.4–4)
UUN UR-MCNC: 14 MG/DL (ref 7–17)
WBC # BLD AUTO: 7.42 K/UL (ref 3.9–12.7)

## 2020-12-31 PROCEDURE — 80061 LIPID PANEL: CPT

## 2020-12-31 PROCEDURE — 82306 VITAMIN D 25 HYDROXY: CPT | Mod: PO

## 2020-12-31 PROCEDURE — 80053 COMPREHEN METABOLIC PANEL: CPT | Mod: PO

## 2020-12-31 PROCEDURE — 83540 ASSAY OF IRON: CPT | Mod: PO

## 2020-12-31 PROCEDURE — 82728 ASSAY OF FERRITIN: CPT

## 2020-12-31 PROCEDURE — 85025 COMPLETE CBC W/AUTO DIFF WBC: CPT | Mod: PO

## 2020-12-31 PROCEDURE — 36415 COLL VENOUS BLD VENIPUNCTURE: CPT | Mod: PO

## 2020-12-31 PROCEDURE — 84443 ASSAY THYROID STIM HORMONE: CPT | Mod: PO

## 2021-01-11 ENCOUNTER — OFFICE VISIT (OUTPATIENT)
Dept: FAMILY MEDICINE | Facility: CLINIC | Age: 51
End: 2021-01-11
Payer: COMMERCIAL

## 2021-01-11 VITALS
BODY MASS INDEX: 35.46 KG/M2 | TEMPERATURE: 97 F | SYSTOLIC BLOOD PRESSURE: 124 MMHG | WEIGHT: 187.81 LBS | HEART RATE: 72 BPM | DIASTOLIC BLOOD PRESSURE: 78 MMHG | OXYGEN SATURATION: 95 % | HEIGHT: 61 IN

## 2021-01-11 DIAGNOSIS — D21.9 FIBROID: ICD-10-CM

## 2021-01-11 DIAGNOSIS — Z00.00 WELLNESS EXAMINATION: Primary | ICD-10-CM

## 2021-01-11 DIAGNOSIS — C43.4 MELANOMA OF SCALP: ICD-10-CM

## 2021-01-11 DIAGNOSIS — J45.20 MILD INTERMITTENT EXTRINSIC ASTHMA WITHOUT COMPLICATION: ICD-10-CM

## 2021-01-11 DIAGNOSIS — R23.8 PAPULE OF SKIN: ICD-10-CM

## 2021-01-11 DIAGNOSIS — K58.1 IRRITABLE BOWEL SYNDROME WITH CONSTIPATION: ICD-10-CM

## 2021-01-11 PROCEDURE — 3008F BODY MASS INDEX DOCD: CPT | Mod: CPTII,S$GLB,, | Performed by: FAMILY MEDICINE

## 2021-01-11 PROCEDURE — 99396 PREV VISIT EST AGE 40-64: CPT | Mod: S$GLB,,, | Performed by: FAMILY MEDICINE

## 2021-01-11 PROCEDURE — 1126F AMNT PAIN NOTED NONE PRSNT: CPT | Mod: S$GLB,,, | Performed by: FAMILY MEDICINE

## 2021-01-11 PROCEDURE — 99396 PR PREVENTIVE VISIT,EST,40-64: ICD-10-PCS | Mod: S$GLB,,, | Performed by: FAMILY MEDICINE

## 2021-01-11 PROCEDURE — 1126F PR PAIN SEVERITY QUANTIFIED, NO PAIN PRESENT: ICD-10-PCS | Mod: S$GLB,,, | Performed by: FAMILY MEDICINE

## 2021-01-11 PROCEDURE — 3008F PR BODY MASS INDEX (BMI) DOCUMENTED: ICD-10-PCS | Mod: CPTII,S$GLB,, | Performed by: FAMILY MEDICINE

## 2021-01-11 RX ORDER — MONTELUKAST SODIUM 10 MG/1
TABLET ORAL
COMMUNITY
Start: 2020-10-24 | End: 2021-01-11 | Stop reason: SDUPTHER

## 2021-01-11 RX ORDER — ALPRAZOLAM 0.5 MG/1
0.5 TABLET ORAL 2 TIMES DAILY PRN
Qty: 30 TABLET | Refills: 0 | Status: SHIPPED | OUTPATIENT
Start: 2021-01-11 | End: 2022-07-22 | Stop reason: SDUPTHER

## 2021-01-11 RX ORDER — CALCIUM CARBONATE 600 MG
600 TABLET ORAL DAILY
COMMUNITY

## 2021-01-11 RX ORDER — MONTELUKAST SODIUM 10 MG/1
10 TABLET ORAL DAILY
Qty: 90 TABLET | Refills: 3 | Status: SHIPPED | OUTPATIENT
Start: 2021-01-11 | End: 2021-03-02

## 2021-01-12 ENCOUNTER — TELEPHONE (OUTPATIENT)
Dept: FAMILY MEDICINE | Facility: CLINIC | Age: 51
End: 2021-01-12

## 2021-02-22 ENCOUNTER — TELEPHONE (OUTPATIENT)
Dept: DERMATOLOGY | Facility: CLINIC | Age: 51
End: 2021-02-22

## 2021-03-02 ENCOUNTER — OFFICE VISIT (OUTPATIENT)
Dept: FAMILY MEDICINE | Facility: CLINIC | Age: 51
End: 2021-03-02
Payer: COMMERCIAL

## 2021-03-02 VITALS
DIASTOLIC BLOOD PRESSURE: 78 MMHG | SYSTOLIC BLOOD PRESSURE: 110 MMHG | WEIGHT: 186.81 LBS | HEIGHT: 61 IN | TEMPERATURE: 98 F | HEART RATE: 104 BPM | OXYGEN SATURATION: 95 % | BODY MASS INDEX: 35.27 KG/M2

## 2021-03-02 DIAGNOSIS — R05.9 COUGH: ICD-10-CM

## 2021-03-02 DIAGNOSIS — J02.9 PHARYNGITIS, UNSPECIFIED ETIOLOGY: ICD-10-CM

## 2021-03-02 DIAGNOSIS — J40 BRONCHITIS: Primary | ICD-10-CM

## 2021-03-02 PROCEDURE — 3008F BODY MASS INDEX DOCD: CPT | Mod: CPTII,S$GLB,, | Performed by: STUDENT IN AN ORGANIZED HEALTH CARE EDUCATION/TRAINING PROGRAM

## 2021-03-02 PROCEDURE — 3008F PR BODY MASS INDEX (BMI) DOCUMENTED: ICD-10-PCS | Mod: CPTII,S$GLB,, | Performed by: STUDENT IN AN ORGANIZED HEALTH CARE EDUCATION/TRAINING PROGRAM

## 2021-03-02 PROCEDURE — 96372 PR INJECTION,THERAP/PROPH/DIAG2ST, IM OR SUBCUT: ICD-10-PCS | Mod: S$GLB,,, | Performed by: STUDENT IN AN ORGANIZED HEALTH CARE EDUCATION/TRAINING PROGRAM

## 2021-03-02 PROCEDURE — 1126F PR PAIN SEVERITY QUANTIFIED, NO PAIN PRESENT: ICD-10-PCS | Mod: S$GLB,,, | Performed by: STUDENT IN AN ORGANIZED HEALTH CARE EDUCATION/TRAINING PROGRAM

## 2021-03-02 PROCEDURE — 99214 PR OFFICE/OUTPT VISIT, EST, LEVL IV, 30-39 MIN: ICD-10-PCS | Mod: 25,S$GLB,, | Performed by: STUDENT IN AN ORGANIZED HEALTH CARE EDUCATION/TRAINING PROGRAM

## 2021-03-02 PROCEDURE — 99214 OFFICE O/P EST MOD 30 MIN: CPT | Mod: 25,S$GLB,, | Performed by: STUDENT IN AN ORGANIZED HEALTH CARE EDUCATION/TRAINING PROGRAM

## 2021-03-02 PROCEDURE — 96372 THER/PROPH/DIAG INJ SC/IM: CPT | Mod: S$GLB,,, | Performed by: STUDENT IN AN ORGANIZED HEALTH CARE EDUCATION/TRAINING PROGRAM

## 2021-03-02 PROCEDURE — 1126F AMNT PAIN NOTED NONE PRSNT: CPT | Mod: S$GLB,,, | Performed by: STUDENT IN AN ORGANIZED HEALTH CARE EDUCATION/TRAINING PROGRAM

## 2021-03-02 RX ORDER — AZITHROMYCIN 250 MG/1
TABLET, FILM COATED ORAL
Qty: 6 TABLET | Refills: 0 | Status: SHIPPED | OUTPATIENT
Start: 2021-03-02 | End: 2021-07-30

## 2021-03-02 RX ORDER — CODEINE PHOSPHATE AND GUAIFENESIN 10; 100 MG/5ML; MG/5ML
5 SOLUTION ORAL EVERY 6 HOURS PRN
Qty: 100 ML | Refills: 1 | Status: SHIPPED | OUTPATIENT
Start: 2021-03-02 | End: 2021-03-12

## 2021-03-02 RX ORDER — TRIAMCINOLONE ACETONIDE 40 MG/ML
40 INJECTION, SUSPENSION INTRA-ARTICULAR; INTRAMUSCULAR ONCE
Status: COMPLETED | OUTPATIENT
Start: 2021-03-02 | End: 2021-03-02

## 2021-03-02 RX ADMIN — TRIAMCINOLONE ACETONIDE 40 MG: 40 INJECTION, SUSPENSION INTRA-ARTICULAR; INTRAMUSCULAR at 09:03

## 2021-06-03 ENCOUNTER — TELEPHONE (OUTPATIENT)
Dept: OBSTETRICS AND GYNECOLOGY | Facility: CLINIC | Age: 51
End: 2021-06-03

## 2021-06-03 DIAGNOSIS — Z12.31 ENCOUNTER FOR SCREENING MAMMOGRAM FOR BREAST CANCER: Primary | ICD-10-CM

## 2021-07-29 ENCOUNTER — PATIENT OUTREACH (OUTPATIENT)
Dept: ADMINISTRATIVE | Facility: OTHER | Age: 51
End: 2021-07-29

## 2021-07-30 ENCOUNTER — OFFICE VISIT (OUTPATIENT)
Dept: DERMATOLOGY | Facility: CLINIC | Age: 51
End: 2021-07-30
Payer: COMMERCIAL

## 2021-07-30 ENCOUNTER — HOSPITAL ENCOUNTER (OUTPATIENT)
Dept: RADIOLOGY | Facility: OTHER | Age: 51
Discharge: HOME | End: 2021-07-30
Attending: OBSTETRICS & GYNECOLOGY
Payer: COMMERCIAL

## 2021-07-30 ENCOUNTER — OFFICE VISIT (OUTPATIENT)
Dept: OBSTETRICS AND GYNECOLOGY | Facility: CLINIC | Age: 51
End: 2021-07-30
Attending: OBSTETRICS & GYNECOLOGY
Payer: COMMERCIAL

## 2021-07-30 VITALS
DIASTOLIC BLOOD PRESSURE: 62 MMHG | SYSTOLIC BLOOD PRESSURE: 112 MMHG | HEIGHT: 61 IN | WEIGHT: 185.44 LBS | BODY MASS INDEX: 35.01 KG/M2

## 2021-07-30 DIAGNOSIS — D22.9 MULTIPLE BENIGN NEVI: ICD-10-CM

## 2021-07-30 DIAGNOSIS — L73.8 SEBACEOUS HYPERPLASIA: ICD-10-CM

## 2021-07-30 DIAGNOSIS — Z12.83 SKIN CANCER SCREENING: Primary | ICD-10-CM

## 2021-07-30 DIAGNOSIS — L81.4 LENTIGINES: ICD-10-CM

## 2021-07-30 DIAGNOSIS — Z01.419 WOMEN'S ANNUAL ROUTINE GYNECOLOGICAL EXAMINATION: Primary | ICD-10-CM

## 2021-07-30 DIAGNOSIS — Z12.31 VISIT FOR SCREENING MAMMOGRAM: ICD-10-CM

## 2021-07-30 DIAGNOSIS — D23.9 DERMATOFIBROMA: ICD-10-CM

## 2021-07-30 DIAGNOSIS — L82.1 SK (SEBORRHEIC KERATOSIS): ICD-10-CM

## 2021-07-30 DIAGNOSIS — Z12.31 ENCOUNTER FOR SCREENING MAMMOGRAM FOR BREAST CANCER: ICD-10-CM

## 2021-07-30 DIAGNOSIS — Z85.820 HISTORY OF MELANOMA: ICD-10-CM

## 2021-07-30 DIAGNOSIS — L57.0 AK (ACTINIC KERATOSIS): ICD-10-CM

## 2021-07-30 DIAGNOSIS — Z78.0 POSTMENOPAUSAL STATUS: ICD-10-CM

## 2021-07-30 PROCEDURE — 3074F PR MOST RECENT SYSTOLIC BLOOD PRESSURE < 130 MM HG: ICD-10-PCS | Mod: CPTII,S$GLB,, | Performed by: OBSTETRICS & GYNECOLOGY

## 2021-07-30 PROCEDURE — 77063 MAMMO DIGITAL SCREENING BILAT WITH TOMO: ICD-10-PCS | Mod: 26,,, | Performed by: RADIOLOGY

## 2021-07-30 PROCEDURE — 17000 DESTRUCT PREMALG LESION: CPT | Mod: S$GLB,,, | Performed by: DERMATOLOGY

## 2021-07-30 PROCEDURE — 17003 DESTRUCTION, PREMALIGNANT LESIONS; SECOND THROUGH 14 LESIONS: ICD-10-PCS | Mod: S$GLB,,, | Performed by: DERMATOLOGY

## 2021-07-30 PROCEDURE — 99396 PR PREVENTIVE VISIT,EST,40-64: ICD-10-PCS | Mod: S$GLB,,, | Performed by: OBSTETRICS & GYNECOLOGY

## 2021-07-30 PROCEDURE — 3078F PR MOST RECENT DIASTOLIC BLOOD PRESSURE < 80 MM HG: ICD-10-PCS | Mod: CPTII,S$GLB,, | Performed by: OBSTETRICS & GYNECOLOGY

## 2021-07-30 PROCEDURE — 99213 PR OFFICE/OUTPT VISIT, EST, LEVL III, 20-29 MIN: ICD-10-PCS | Mod: 25,S$GLB,, | Performed by: DERMATOLOGY

## 2021-07-30 PROCEDURE — 1159F PR MEDICATION LIST DOCUMENTED IN MEDICAL RECORD: ICD-10-PCS | Mod: CPTII,S$GLB,, | Performed by: OBSTETRICS & GYNECOLOGY

## 2021-07-30 PROCEDURE — 1160F PR REVIEW ALL MEDS BY PRESCRIBER/CLIN PHARMACIST DOCUMENTED: ICD-10-PCS | Mod: CPTII,S$GLB,, | Performed by: DERMATOLOGY

## 2021-07-30 PROCEDURE — 99999 PR PBB SHADOW E&M-EST. PATIENT-LVL III: ICD-10-PCS | Mod: PBBFAC,,, | Performed by: OBSTETRICS & GYNECOLOGY

## 2021-07-30 PROCEDURE — 1126F AMNT PAIN NOTED NONE PRSNT: CPT | Mod: CPTII,S$GLB,, | Performed by: DERMATOLOGY

## 2021-07-30 PROCEDURE — 99999 PR PBB SHADOW E&M-EST. PATIENT-LVL III: CPT | Mod: PBBFAC,,, | Performed by: DERMATOLOGY

## 2021-07-30 PROCEDURE — 99999 PR PBB SHADOW E&M-EST. PATIENT-LVL III: ICD-10-PCS | Mod: PBBFAC,,, | Performed by: DERMATOLOGY

## 2021-07-30 PROCEDURE — 1159F MED LIST DOCD IN RCRD: CPT | Mod: CPTII,S$GLB,, | Performed by: OBSTETRICS & GYNECOLOGY

## 2021-07-30 PROCEDURE — 17000 PR DESTRUCTION(LASER SURGERY,CRYOSURGERY,CHEMOSURGERY),PREMALIGNANT LESIONS,FIRST LESION: ICD-10-PCS | Mod: S$GLB,,, | Performed by: DERMATOLOGY

## 2021-07-30 PROCEDURE — 77067 SCR MAMMO BI INCL CAD: CPT | Mod: 26,,, | Performed by: RADIOLOGY

## 2021-07-30 PROCEDURE — 77067 SCR MAMMO BI INCL CAD: CPT | Mod: TC

## 2021-07-30 PROCEDURE — 3008F PR BODY MASS INDEX (BMI) DOCUMENTED: ICD-10-PCS | Mod: CPTII,S$GLB,, | Performed by: OBSTETRICS & GYNECOLOGY

## 2021-07-30 PROCEDURE — 1160F PR REVIEW ALL MEDS BY PRESCRIBER/CLIN PHARMACIST DOCUMENTED: ICD-10-PCS | Mod: CPTII,S$GLB,, | Performed by: OBSTETRICS & GYNECOLOGY

## 2021-07-30 PROCEDURE — 3074F SYST BP LT 130 MM HG: CPT | Mod: CPTII,S$GLB,, | Performed by: OBSTETRICS & GYNECOLOGY

## 2021-07-30 PROCEDURE — 17003 DESTRUCT PREMALG LES 2-14: CPT | Mod: S$GLB,,, | Performed by: DERMATOLOGY

## 2021-07-30 PROCEDURE — 99213 OFFICE O/P EST LOW 20 MIN: CPT | Mod: 25,S$GLB,, | Performed by: DERMATOLOGY

## 2021-07-30 PROCEDURE — 1126F AMNT PAIN NOTED NONE PRSNT: CPT | Mod: CPTII,S$GLB,, | Performed by: OBSTETRICS & GYNECOLOGY

## 2021-07-30 PROCEDURE — 77063 BREAST TOMOSYNTHESIS BI: CPT | Mod: 26,,, | Performed by: RADIOLOGY

## 2021-07-30 PROCEDURE — 3078F DIAST BP <80 MM HG: CPT | Mod: CPTII,S$GLB,, | Performed by: OBSTETRICS & GYNECOLOGY

## 2021-07-30 PROCEDURE — 3008F BODY MASS INDEX DOCD: CPT | Mod: CPTII,S$GLB,, | Performed by: OBSTETRICS & GYNECOLOGY

## 2021-07-30 PROCEDURE — 99396 PREV VISIT EST AGE 40-64: CPT | Mod: S$GLB,,, | Performed by: OBSTETRICS & GYNECOLOGY

## 2021-07-30 PROCEDURE — 1126F PR PAIN SEVERITY QUANTIFIED, NO PAIN PRESENT: ICD-10-PCS | Mod: CPTII,S$GLB,, | Performed by: DERMATOLOGY

## 2021-07-30 PROCEDURE — 1160F RVW MEDS BY RX/DR IN RCRD: CPT | Mod: CPTII,S$GLB,, | Performed by: DERMATOLOGY

## 2021-07-30 PROCEDURE — 1159F PR MEDICATION LIST DOCUMENTED IN MEDICAL RECORD: ICD-10-PCS | Mod: CPTII,S$GLB,, | Performed by: DERMATOLOGY

## 2021-07-30 PROCEDURE — 1159F MED LIST DOCD IN RCRD: CPT | Mod: CPTII,S$GLB,, | Performed by: DERMATOLOGY

## 2021-07-30 PROCEDURE — 99999 PR PBB SHADOW E&M-EST. PATIENT-LVL III: CPT | Mod: PBBFAC,,, | Performed by: OBSTETRICS & GYNECOLOGY

## 2021-07-30 PROCEDURE — 77067 MAMMO DIGITAL SCREENING BILAT WITH TOMO: ICD-10-PCS | Mod: 26,,, | Performed by: RADIOLOGY

## 2021-07-30 PROCEDURE — 1126F PR PAIN SEVERITY QUANTIFIED, NO PAIN PRESENT: ICD-10-PCS | Mod: CPTII,S$GLB,, | Performed by: OBSTETRICS & GYNECOLOGY

## 2021-07-30 PROCEDURE — 1160F RVW MEDS BY RX/DR IN RCRD: CPT | Mod: CPTII,S$GLB,, | Performed by: OBSTETRICS & GYNECOLOGY

## 2021-08-02 ENCOUNTER — PATIENT MESSAGE (OUTPATIENT)
Dept: OBSTETRICS AND GYNECOLOGY | Facility: CLINIC | Age: 51
End: 2021-08-02

## 2021-08-28 ENCOUNTER — HOSPITAL ENCOUNTER (EMERGENCY)
Facility: HOSPITAL | Age: 51
Discharge: HOME | End: 2021-08-28
Attending: FAMILY MEDICINE
Payer: COMMERCIAL

## 2021-08-28 VITALS
OXYGEN SATURATION: 97 % | TEMPERATURE: 98 F | RESPIRATION RATE: 18 BRPM | HEIGHT: 61 IN | BODY MASS INDEX: 33.99 KG/M2 | HEART RATE: 84 BPM | SYSTOLIC BLOOD PRESSURE: 140 MMHG | DIASTOLIC BLOOD PRESSURE: 89 MMHG | WEIGHT: 180 LBS

## 2021-08-28 DIAGNOSIS — R10.2 PELVIC PAIN: Primary | ICD-10-CM

## 2021-08-28 DIAGNOSIS — D25.9 UTERINE LEIOMYOMA, UNSPECIFIED LOCATION: ICD-10-CM

## 2021-08-28 LAB
BILIRUB UR QL STRIP: NEGATIVE
CLARITY UR REFRACT.AUTO: CLEAR
COLOR UR AUTO: YELLOW
GLUCOSE UR QL STRIP: NEGATIVE
HGB UR QL STRIP: NEGATIVE
KETONES UR QL STRIP: NEGATIVE
LEUKOCYTE ESTERASE UR QL STRIP: NEGATIVE
NITRITE UR QL STRIP: NEGATIVE
PH UR STRIP: 6 [PH] (ref 5–8)
PROT UR QL STRIP: NEGATIVE
SP GR UR STRIP: 1.01 (ref 1–1.03)
URN SPEC COLLECT METH UR: NORMAL
UROBILINOGEN UR STRIP-ACNC: NEGATIVE EU/DL

## 2021-08-28 PROCEDURE — 63600175 PHARM REV CODE 636 W HCPCS: Mod: ER | Performed by: FAMILY MEDICINE

## 2021-08-28 PROCEDURE — 81003 URINALYSIS AUTO W/O SCOPE: CPT | Mod: ER | Performed by: FAMILY MEDICINE

## 2021-08-28 PROCEDURE — 96372 THER/PROPH/DIAG INJ SC/IM: CPT | Mod: ER

## 2021-08-28 PROCEDURE — 99284 EMERGENCY DEPT VISIT MOD MDM: CPT | Mod: 25,ER

## 2021-08-28 RX ORDER — KETOROLAC TROMETHAMINE 30 MG/ML
30 INJECTION, SOLUTION INTRAMUSCULAR; INTRAVENOUS
Status: COMPLETED | OUTPATIENT
Start: 2021-08-28 | End: 2021-08-28

## 2021-08-28 RX ORDER — NAPROXEN 500 MG/1
500 TABLET ORAL 2 TIMES DAILY WITH MEALS
Qty: 30 TABLET | Refills: 0 | Status: SHIPPED | OUTPATIENT
Start: 2021-08-28 | End: 2022-10-25

## 2021-08-28 RX ADMIN — KETOROLAC TROMETHAMINE 30 MG: 30 INJECTION, SOLUTION INTRAMUSCULAR; INTRAVENOUS at 12:08

## 2021-09-10 ENCOUNTER — TELEPHONE (OUTPATIENT)
Dept: OBSTETRICS AND GYNECOLOGY | Facility: CLINIC | Age: 51
End: 2021-09-10

## 2021-09-22 ENCOUNTER — PATIENT OUTREACH (OUTPATIENT)
Dept: ADMINISTRATIVE | Facility: OTHER | Age: 51
End: 2021-09-22

## 2021-09-23 ENCOUNTER — OFFICE VISIT (OUTPATIENT)
Dept: OBSTETRICS AND GYNECOLOGY | Facility: CLINIC | Age: 51
End: 2021-09-23
Attending: OBSTETRICS & GYNECOLOGY
Payer: COMMERCIAL

## 2021-09-23 VITALS
WEIGHT: 186.94 LBS | SYSTOLIC BLOOD PRESSURE: 132 MMHG | BODY MASS INDEX: 35.29 KG/M2 | HEIGHT: 61 IN | DIASTOLIC BLOOD PRESSURE: 82 MMHG

## 2021-09-23 DIAGNOSIS — Z78.0 POSTMENOPAUSAL STATUS: ICD-10-CM

## 2021-09-23 DIAGNOSIS — R10.2 PELVIC PAIN: Primary | ICD-10-CM

## 2021-09-23 PROCEDURE — 1159F MED LIST DOCD IN RCRD: CPT | Mod: CPTII,S$GLB,, | Performed by: OBSTETRICS & GYNECOLOGY

## 2021-09-23 PROCEDURE — 1159F PR MEDICATION LIST DOCUMENTED IN MEDICAL RECORD: ICD-10-PCS | Mod: CPTII,S$GLB,, | Performed by: OBSTETRICS & GYNECOLOGY

## 2021-09-23 PROCEDURE — 1160F PR REVIEW ALL MEDS BY PRESCRIBER/CLIN PHARMACIST DOCUMENTED: ICD-10-PCS | Mod: CPTII,S$GLB,, | Performed by: OBSTETRICS & GYNECOLOGY

## 2021-09-23 PROCEDURE — 99999 PR PBB SHADOW E&M-EST. PATIENT-LVL III: ICD-10-PCS | Mod: PBBFAC,,, | Performed by: OBSTETRICS & GYNECOLOGY

## 2021-09-23 PROCEDURE — 99999 PR PBB SHADOW E&M-EST. PATIENT-LVL III: CPT | Mod: PBBFAC,,, | Performed by: OBSTETRICS & GYNECOLOGY

## 2021-09-23 PROCEDURE — 3079F PR MOST RECENT DIASTOLIC BLOOD PRESSURE 80-89 MM HG: ICD-10-PCS | Mod: CPTII,S$GLB,, | Performed by: OBSTETRICS & GYNECOLOGY

## 2021-09-23 PROCEDURE — 3075F SYST BP GE 130 - 139MM HG: CPT | Mod: CPTII,S$GLB,, | Performed by: OBSTETRICS & GYNECOLOGY

## 2021-09-23 PROCEDURE — 3008F PR BODY MASS INDEX (BMI) DOCUMENTED: ICD-10-PCS | Mod: CPTII,S$GLB,, | Performed by: OBSTETRICS & GYNECOLOGY

## 2021-09-23 PROCEDURE — 3079F DIAST BP 80-89 MM HG: CPT | Mod: CPTII,S$GLB,, | Performed by: OBSTETRICS & GYNECOLOGY

## 2021-09-23 PROCEDURE — 3075F PR MOST RECENT SYSTOLIC BLOOD PRESS GE 130-139MM HG: ICD-10-PCS | Mod: CPTII,S$GLB,, | Performed by: OBSTETRICS & GYNECOLOGY

## 2021-09-23 PROCEDURE — 3008F BODY MASS INDEX DOCD: CPT | Mod: CPTII,S$GLB,, | Performed by: OBSTETRICS & GYNECOLOGY

## 2021-09-23 PROCEDURE — 1160F RVW MEDS BY RX/DR IN RCRD: CPT | Mod: CPTII,S$GLB,, | Performed by: OBSTETRICS & GYNECOLOGY

## 2021-09-23 PROCEDURE — 99213 OFFICE O/P EST LOW 20 MIN: CPT | Mod: S$GLB,,, | Performed by: OBSTETRICS & GYNECOLOGY

## 2021-09-23 PROCEDURE — 99213 PR OFFICE/OUTPT VISIT, EST, LEVL III, 20-29 MIN: ICD-10-PCS | Mod: S$GLB,,, | Performed by: OBSTETRICS & GYNECOLOGY

## 2021-09-24 ENCOUNTER — HOSPITAL ENCOUNTER (OUTPATIENT)
Dept: RADIOLOGY | Facility: HOSPITAL | Age: 51
Discharge: HOME | End: 2021-09-24
Attending: OBSTETRICS & GYNECOLOGY
Payer: COMMERCIAL

## 2021-09-24 DIAGNOSIS — R10.2 PELVIC PAIN: ICD-10-CM

## 2021-09-24 PROCEDURE — 76856 US EXAM PELVIC COMPLETE: CPT | Mod: TC,PO

## 2021-09-27 ENCOUNTER — TELEPHONE (OUTPATIENT)
Dept: OBSTETRICS AND GYNECOLOGY | Facility: CLINIC | Age: 51
End: 2021-09-27

## 2021-09-30 ENCOUNTER — TELEPHONE (OUTPATIENT)
Dept: OBSTETRICS AND GYNECOLOGY | Facility: CLINIC | Age: 51
End: 2021-09-30

## 2021-10-13 ENCOUNTER — TELEPHONE (OUTPATIENT)
Dept: OBSTETRICS AND GYNECOLOGY | Facility: CLINIC | Age: 51
End: 2021-10-13

## 2021-11-19 ENCOUNTER — PROCEDURE VISIT (OUTPATIENT)
Dept: OBSTETRICS AND GYNECOLOGY | Facility: CLINIC | Age: 51
End: 2021-11-19
Attending: OBSTETRICS & GYNECOLOGY
Payer: COMMERCIAL

## 2021-11-19 VITALS
WEIGHT: 190.69 LBS | BODY MASS INDEX: 36 KG/M2 | HEIGHT: 61 IN | DIASTOLIC BLOOD PRESSURE: 80 MMHG | SYSTOLIC BLOOD PRESSURE: 142 MMHG

## 2021-11-19 DIAGNOSIS — R93.89 THICKENED ENDOMETRIUM: Primary | ICD-10-CM

## 2021-11-19 DIAGNOSIS — Z78.0 POSTMENOPAUSAL STATUS: ICD-10-CM

## 2021-11-19 LAB
B-HCG UR QL: NEGATIVE
CTP QC/QA: YES

## 2021-11-19 PROCEDURE — 81025 PR  URINE PREGNANCY TEST: ICD-10-PCS | Mod: S$GLB,,, | Performed by: OBSTETRICS & GYNECOLOGY

## 2021-11-19 PROCEDURE — 88305 TISSUE EXAM BY PATHOLOGIST: ICD-10-PCS | Mod: 26,,, | Performed by: PATHOLOGY

## 2021-11-19 PROCEDURE — 58100 BIOPSY OF UTERUS LINING: CPT | Mod: S$GLB,,, | Performed by: OBSTETRICS & GYNECOLOGY

## 2021-11-19 PROCEDURE — 88305 TISSUE EXAM BY PATHOLOGIST: CPT | Mod: 26,,, | Performed by: PATHOLOGY

## 2021-11-19 PROCEDURE — 88305 TISSUE EXAM BY PATHOLOGIST: CPT | Performed by: PATHOLOGY

## 2021-11-19 PROCEDURE — 81025 URINE PREGNANCY TEST: CPT | Mod: S$GLB,,, | Performed by: OBSTETRICS & GYNECOLOGY

## 2021-11-19 PROCEDURE — 58100 PR BIOPSY OF UTERUS LINING: ICD-10-PCS | Mod: S$GLB,,, | Performed by: OBSTETRICS & GYNECOLOGY

## 2021-11-26 LAB
FINAL PATHOLOGIC DIAGNOSIS: NORMAL
GROSS: NORMAL
Lab: NORMAL

## 2021-11-29 ENCOUNTER — TELEPHONE (OUTPATIENT)
Dept: OBSTETRICS AND GYNECOLOGY | Facility: CLINIC | Age: 51
End: 2021-11-29
Payer: COMMERCIAL

## 2021-12-03 ENCOUNTER — OFFICE VISIT (OUTPATIENT)
Dept: FAMILY MEDICINE | Facility: CLINIC | Age: 51
End: 2021-12-03
Payer: COMMERCIAL

## 2021-12-03 VITALS
OXYGEN SATURATION: 96 % | DIASTOLIC BLOOD PRESSURE: 82 MMHG | HEIGHT: 61 IN | HEART RATE: 107 BPM | BODY MASS INDEX: 35.46 KG/M2 | TEMPERATURE: 98 F | WEIGHT: 187.81 LBS | SYSTOLIC BLOOD PRESSURE: 120 MMHG

## 2021-12-03 DIAGNOSIS — J01.10 ACUTE NON-RECURRENT FRONTAL SINUSITIS: ICD-10-CM

## 2021-12-03 DIAGNOSIS — H10.022 PINK EYE DISEASE OF LEFT EYE: Primary | ICD-10-CM

## 2021-12-03 PROCEDURE — 99214 OFFICE O/P EST MOD 30 MIN: CPT | Mod: S$GLB,,, | Performed by: STUDENT IN AN ORGANIZED HEALTH CARE EDUCATION/TRAINING PROGRAM

## 2021-12-03 PROCEDURE — 99214 PR OFFICE/OUTPT VISIT, EST, LEVL IV, 30-39 MIN: ICD-10-PCS | Mod: S$GLB,,, | Performed by: STUDENT IN AN ORGANIZED HEALTH CARE EDUCATION/TRAINING PROGRAM

## 2021-12-03 RX ORDER — AZITHROMYCIN 250 MG/1
TABLET, FILM COATED ORAL
Qty: 6 TABLET | Refills: 0 | Status: SHIPPED | OUTPATIENT
Start: 2021-12-03 | End: 2021-12-08

## 2021-12-03 RX ORDER — POLYMYXIN B SULFATE AND TRIMETHOPRIM 1; 10000 MG/ML; [USP'U]/ML
1 SOLUTION OPHTHALMIC 3 TIMES DAILY
Qty: 10 ML | Refills: 0 | Status: SHIPPED | OUTPATIENT
Start: 2021-12-03 | End: 2022-07-19

## 2022-01-18 NOTE — TELEPHONE ENCOUNTER
Care Due:                  Date            Visit Type   Department     Provider  --------------------------------------------------------------------------------                                SAME DAY -                              ESTABLISHED   [facility]  Last Visit: 12-      PATIENT      MEDICINE       Mario Wyman  Next Visit: None Scheduled  None         None Found                                                            Last  Test          Frequency    Reason                     Performed    Due Date  --------------------------------------------------------------------------------    CMP.........  12 months..  rosuvastatin.............  Not Found    Overdue    Lipid Panel.  12 months..  rosuvastatin.............  Not Found    Overdue    Powered by [employer]. Reference number: [id number].   1/17/2022 8:51:45 PM CST

## 2022-01-21 RX ORDER — CITALOPRAM 10 MG/1
TABLET ORAL
Qty: 90 TABLET | Refills: 0 | Status: SHIPPED | OUTPATIENT
Start: 2022-01-21 | End: 2022-04-17

## 2022-01-22 NOTE — TELEPHONE ENCOUNTER
Refill Routing Note   Medication(s) are not appropriate for processing by [facility]:    - Outside of protocol  - Required laboratory values are outdated  - Required indication for medication not on problem list (GERD/LERD/Jorge A's/Esophigitis/Zollinger Coley Syndrome)           Medication reconciliation completed: No      Automatic Epic Protocol Generated Data:    Requested Prescriptions   Pending Prescriptions Disp Refills    rosuvastatin (CRESTOR) 10 MG tablet [Pharmacy Med Name: ROSUVASTATIN TABS 10MG] 90 tablet 0     Sig: TAKE 1 TABLET DAILY       Cardiovascular:  Antilipid - Statins Failed - 1/17/2022  8:51 PM        Failed - ALT is 131 or below and within 360 days     ALT   Date Value Ref Range Status   12/31/2020 71 (H) 10 - 44 U/L Final   05/12/2020 50 (H) 10 - 44 U/L Final   05/09/2018 46 (H) 10 - 44 U/L Final              Failed - AST is 119 or below and within 360 days     AST   Date Value Ref Range Status   12/31/2020 67 (H) 15 - 46 U/L Final   05/12/2020 45 (H) 10 - 40 U/L Final   05/09/2018 50 (H) 15 - 46 U/L Final              Failed - Total Cholesterol within 360 days     Lab Results   Component Value Date    CHOL 235 (H) 12/31/2020    CHOL 202 (H) 05/09/2018    CHOL 174 01/18/2016              Failed - LDL within 360 days     LDL Cholesterol   Date Value Ref Range Status   12/31/2020 114.4 63.0 - 159.0 mg/dL Final     Comment:     The National Cholesterol Education Program (NCEP) has set the  following guidelines (reference values) for LDL Cholesterol:  Optimal.......................<130 mg/dL  Borderline High...............130-159 mg/dL  High..........................160-189 mg/dL  Very High.....................>190 mg/dL              Failed - HDL within 360 days     HDL   Date Value Ref Range Status   12/31/2020 57 40 - 75 mg/dL Final     Comment:     The National Cholesterol Education Program (NCEP) has set the  following guidelines (reference values) for HDL  Cholesterol:  Low...............<40 mg/dL  Optimal...........>60 mg/dL              Failed - Triglycerides within 360 days     Lab Results   Component Value Date    TRIG 318 (H) 12/31/2020    TRIG 195 (H) 05/09/2018    TRIG 164 (H) 01/18/2016              Passed - Patient is at least 18 years old        Passed - Valid encounter within last 15 months     Recent Visits  Date Type Provider Dept   01/11/21 Office Visit Carlos Santiago MD [facility]   Showing recent visits within past 720 days and meeting all other requirements  Future Appointments  No visits were found meeting these conditions.  Showing future appointments within next 150 days and meeting all other requirements                  LINZESS 72 mcg Cap capsule [Pharmacy Med Name: LINZESS CAPS 30'S 72MCG] 90 capsule 0     Sig: TAKE 1 CAPSULE DAILY       There is no refill protocol information for this order       esomeprazole (NEXIUM) 20 MG capsule [Pharmacy Med Name: ESOMEPRAZOLE MAGNESIUM DR CAPS 20MG] 90 capsule 0     Sig: TAKE 1 CAPSULE BEFORE BREAKFAST       Gastroenterology: Proton Pump Inhibitors Failed - 1/17/2022  8:51 PM        Failed - An appropriate indication is on the problem list        Passed - Patient is at least 18 years old        Passed - Osteoporosis is not on problem list        Passed - Plavix is not on active medication list        Passed - Valid encounter within last 15 months     Recent Visits  Date Type Provider Dept   01/11/21 Office Visit Carlos Santiago MD [facility]   Showing recent visits within past 720 days and meeting all other requirements  Future Appointments  No visits were found meeting these conditions.  Showing future appointments within next 150 days and meeting all other requirements                 Signed Prescriptions Disp Refills    citalopram (CELEXA) 10 MG tablet 90 tablet 0     Sig: TAKE 1 TABLET EVERY EVENING       Psychiatry:  Antidepressants - SSRI Passed - 1/17/2022  8:51 PM         Passed - Patient is at least 18 years old        Passed - Valid encounter within last 15 months     Recent Visits  Date Type Provider Dept   01/11/21 Office Visit Carlos Santiago MD [facility]   Showing recent visits within past 720 days and meeting all other requirements  Future Appointments  No visits were found meeting these conditions.  Showing future appointments within next 150 days and meeting all other requirements                      Appointments  past 12m or future 3m with PCP    Date Provider   Last Visit   1/11/2021 Carlos Santiago MD   Next Visit   Visit date not found Carlos Santiago MD   ED visits in past 90 days: 0     Note composed:7:35 PM 01/21/2022

## 2022-01-23 RX ORDER — LINACLOTIDE 72 UG/1
CAPSULE, GELATIN COATED ORAL
Qty: 90 CAPSULE | Refills: 0 | Status: SHIPPED | OUTPATIENT
Start: 2022-01-23 | End: 2022-07-19

## 2022-01-23 RX ORDER — HYDROGEN PEROXIDE 3 %
SOLUTION, NON-ORAL MISCELLANEOUS
Qty: 90 CAPSULE | Refills: 0 | Status: SHIPPED | OUTPATIENT
Start: 2022-01-23 | End: 2022-04-17

## 2022-01-23 RX ORDER — ROSUVASTATIN CALCIUM 10 MG/1
TABLET, COATED ORAL
Qty: 90 TABLET | Refills: 0 | Status: SHIPPED | OUTPATIENT
Start: 2022-01-23 | End: 2022-04-17

## 2022-04-17 ENCOUNTER — PATIENT MESSAGE (OUTPATIENT)
Dept: FAMILY MEDICINE | Facility: CLINIC | Age: 52
End: 2022-04-17
Payer: COMMERCIAL

## 2022-04-17 RX ORDER — ROSUVASTATIN CALCIUM 10 MG/1
TABLET, COATED ORAL
Qty: 90 TABLET | Refills: 0 | Status: SHIPPED | OUTPATIENT
Start: 2022-04-17 | End: 2022-05-04 | Stop reason: SDUPTHER

## 2022-04-17 RX ORDER — ALBUTEROL SULFATE 90 UG/1
AEROSOL, METERED RESPIRATORY (INHALATION)
Qty: 17 G | Refills: 0 | Status: SHIPPED | OUTPATIENT
Start: 2022-04-17 | End: 2023-11-30 | Stop reason: SDUPTHER

## 2022-04-17 RX ORDER — CITALOPRAM 10 MG/1
TABLET ORAL
Qty: 90 TABLET | Refills: 0 | Status: SHIPPED | OUTPATIENT
Start: 2022-04-17 | End: 2022-05-04 | Stop reason: SDUPTHER

## 2022-04-17 RX ORDER — HYDROGEN PEROXIDE 3 %
SOLUTION, NON-ORAL MISCELLANEOUS
Qty: 90 CAPSULE | Refills: 0 | Status: SHIPPED | OUTPATIENT
Start: 2022-04-17 | End: 2022-05-04 | Stop reason: SDUPTHER

## 2022-04-17 NOTE — TELEPHONE ENCOUNTER
Care Due:                  Date            Visit Type   Department     Provider  --------------------------------------------------------------------------------                                SAME DAY -                              ESTABLISHED   [facility] FAMILY  Last Visit: 12-      PATIENT      MEDICINE       Mario Wyman  Next Visit: None Scheduled  None         None Found                                                            Last  Test          Frequency    Reason                     Performed    Due Date  --------------------------------------------------------------------------------    CMP.........  12 months..  rosuvastatin.............  12- 12-    Lipid Panel.  12 months..  rosuvastatin.............  12- 12-    Powered by [employer]. Reference number: [id number].   4/17/2022 2:04:19 PM CDT

## 2022-04-17 NOTE — TELEPHONE ENCOUNTER
Refill Routing Note   Medication(s) are not appropriate for processing by [facility] for the following reason(s):      - Patient has not been seen in over 15 months by PCP  - Required laboratory values are outdated  - Patient has been seen in the ED/Hospital since the last PCP visit  - Required indication for medication not on problem list (GERD)    [facility] action(s):                   Appointments  past 12m or future 3m with PCP    Date Provider   Last Visit   1/11/2021 Carlos Santiago MD   Next Visit   Visit date not found Carlos Santiago MD   ED visits in past 90 days: 0        Note composed:2:51 PM 04/17/2022

## 2022-04-18 ENCOUNTER — CLINICAL SUPPORT (OUTPATIENT)
Dept: FAMILY MEDICINE | Facility: CLINIC | Age: 52
End: 2022-04-18
Payer: COMMERCIAL

## 2022-04-18 ENCOUNTER — TELEPHONE (OUTPATIENT)
Dept: FAMILY MEDICINE | Facility: CLINIC | Age: 52
End: 2022-04-18

## 2022-04-18 ENCOUNTER — PATIENT MESSAGE (OUTPATIENT)
Dept: FAMILY MEDICINE | Facility: CLINIC | Age: 52
End: 2022-04-18
Payer: COMMERCIAL

## 2022-04-18 DIAGNOSIS — J30.2 SEASONAL ALLERGIES: Primary | ICD-10-CM

## 2022-04-18 DIAGNOSIS — Z00.00 WELLNESS EXAMINATION: Primary | ICD-10-CM

## 2022-04-18 PROCEDURE — 96372 PR INJECTION,THERAP/PROPH/DIAG2ST, IM OR SUBCUT: ICD-10-PCS | Mod: S$GLB,,, | Performed by: STUDENT IN AN ORGANIZED HEALTH CARE EDUCATION/TRAINING PROGRAM

## 2022-04-18 PROCEDURE — 96372 THER/PROPH/DIAG INJ SC/IM: CPT | Mod: S$GLB,,, | Performed by: STUDENT IN AN ORGANIZED HEALTH CARE EDUCATION/TRAINING PROGRAM

## 2022-04-18 RX ORDER — TRIAMCINOLONE ACETONIDE 40 MG/ML
80 INJECTION, SUSPENSION INTRA-ARTICULAR; INTRAMUSCULAR ONCE
Status: COMPLETED | OUTPATIENT
Start: 2022-04-18 | End: 2022-04-18

## 2022-04-18 RX ADMIN — TRIAMCINOLONE ACETONIDE 80 MG: 40 INJECTION, SUSPENSION INTRA-ARTICULAR; INTRAMUSCULAR at 02:04

## 2022-05-03 ENCOUNTER — PATIENT MESSAGE (OUTPATIENT)
Dept: FAMILY MEDICINE | Facility: CLINIC | Age: 52
End: 2022-05-03
Payer: COMMERCIAL

## 2022-05-04 RX ORDER — CITALOPRAM 10 MG/1
10 TABLET ORAL NIGHTLY
Qty: 90 TABLET | Refills: 3 | Status: SHIPPED | OUTPATIENT
Start: 2022-05-04 | End: 2022-07-31 | Stop reason: SDUPTHER

## 2022-05-04 RX ORDER — HYDROGEN PEROXIDE 3 %
20 SOLUTION, NON-ORAL MISCELLANEOUS
Qty: 90 CAPSULE | Refills: 3 | Status: SHIPPED | OUTPATIENT
Start: 2022-05-04 | End: 2022-07-22 | Stop reason: SDUPTHER

## 2022-05-04 RX ORDER — ROSUVASTATIN CALCIUM 10 MG/1
10 TABLET, COATED ORAL DAILY
Qty: 90 TABLET | Refills: 3 | Status: SHIPPED | OUTPATIENT
Start: 2022-05-04 | End: 2022-07-19

## 2022-05-07 ENCOUNTER — PATIENT MESSAGE (OUTPATIENT)
Dept: DERMATOLOGY | Facility: CLINIC | Age: 52
End: 2022-05-07
Payer: COMMERCIAL

## 2022-06-20 ENCOUNTER — PATIENT MESSAGE (OUTPATIENT)
Dept: FAMILY MEDICINE | Facility: CLINIC | Age: 52
End: 2022-06-20
Payer: COMMERCIAL

## 2022-06-21 RX ORDER — FLUTICASONE PROPIONATE 50 MCG
2 SPRAY, SUSPENSION (ML) NASAL DAILY PRN
Qty: 48 G | Refills: 3 | Status: SHIPPED | OUTPATIENT
Start: 2022-06-21 | End: 2022-11-09 | Stop reason: SDUPTHER

## 2022-06-21 NOTE — TELEPHONE ENCOUNTER
No new care gaps identified.  [facility] Embedded Care Gaps. Reference number: [id number]. 6/21/2022   8:16:25 AM CDT

## 2022-07-12 ENCOUNTER — LAB VISIT (OUTPATIENT)
Dept: LAB | Facility: HOSPITAL | Age: 52
End: 2022-07-12
Attending: FAMILY MEDICINE
Payer: COMMERCIAL

## 2022-07-12 DIAGNOSIS — Z00.00 WELLNESS EXAMINATION: ICD-10-CM

## 2022-07-12 LAB
ALBUMIN/CREAT UR: 23.2 UG/MG (ref 0–30)
BILIRUB UR QL STRIP: NEGATIVE
CLARITY UR REFRACT.AUTO: CLEAR
COLOR UR AUTO: YELLOW
CREAT UR-MCNC: 177 MG/DL (ref 15–325)
GLUCOSE UR QL STRIP: NEGATIVE
HGB UR QL STRIP: NEGATIVE
KETONES UR QL STRIP: NEGATIVE
LEUKOCYTE ESTERASE UR QL STRIP: NEGATIVE
MICROALBUMIN UR DL<=1MG/L-MCNC: 41 UG/ML
NITRITE UR QL STRIP: NEGATIVE
PH UR STRIP: 6 [PH] (ref 5–8)
PROT UR QL STRIP: NEGATIVE
SP GR UR STRIP: >=1.03 (ref 1–1.03)
URN SPEC COLLECT METH UR: ABNORMAL
UROBILINOGEN UR STRIP-ACNC: NEGATIVE EU/DL

## 2022-07-12 PROCEDURE — 81003 URINALYSIS AUTO W/O SCOPE: CPT | Mod: PO | Performed by: FAMILY MEDICINE

## 2022-07-12 PROCEDURE — 82570 ASSAY OF URINE CREATININE: CPT | Mod: PO | Performed by: FAMILY MEDICINE

## 2022-07-12 PROCEDURE — 82043 UR ALBUMIN QUANTITATIVE: CPT | Mod: PO | Performed by: FAMILY MEDICINE

## 2022-07-19 ENCOUNTER — OFFICE VISIT (OUTPATIENT)
Dept: FAMILY MEDICINE | Facility: CLINIC | Age: 52
End: 2022-07-19
Payer: COMMERCIAL

## 2022-07-19 ENCOUNTER — TELEPHONE (OUTPATIENT)
Dept: INTERNAL MEDICINE | Facility: CLINIC | Age: 52
End: 2022-07-19
Payer: COMMERCIAL

## 2022-07-19 VITALS
HEIGHT: 61 IN | SYSTOLIC BLOOD PRESSURE: 130 MMHG | TEMPERATURE: 99 F | WEIGHT: 197.44 LBS | HEART RATE: 97 BPM | DIASTOLIC BLOOD PRESSURE: 70 MMHG | OXYGEN SATURATION: 96 % | BODY MASS INDEX: 37.28 KG/M2

## 2022-07-19 DIAGNOSIS — R79.89 ELEVATED LFTS: ICD-10-CM

## 2022-07-19 DIAGNOSIS — K76.0 FATTY LIVER: ICD-10-CM

## 2022-07-19 DIAGNOSIS — E78.5 HYPERLIPIDEMIA, UNSPECIFIED HYPERLIPIDEMIA TYPE: ICD-10-CM

## 2022-07-19 DIAGNOSIS — R73.03 PRE-DIABETES: Primary | ICD-10-CM

## 2022-07-19 PROCEDURE — 3078F DIAST BP <80 MM HG: CPT | Mod: CPTII,S$GLB,, | Performed by: FAMILY MEDICINE

## 2022-07-19 PROCEDURE — 3044F HG A1C LEVEL LT 7.0%: CPT | Mod: CPTII,S$GLB,, | Performed by: FAMILY MEDICINE

## 2022-07-19 PROCEDURE — 3008F PR BODY MASS INDEX (BMI) DOCUMENTED: ICD-10-PCS | Mod: CPTII,S$GLB,, | Performed by: FAMILY MEDICINE

## 2022-07-19 PROCEDURE — 3075F SYST BP GE 130 - 139MM HG: CPT | Mod: CPTII,S$GLB,, | Performed by: FAMILY MEDICINE

## 2022-07-19 PROCEDURE — 3075F PR MOST RECENT SYSTOLIC BLOOD PRESS GE 130-139MM HG: ICD-10-PCS | Mod: CPTII,S$GLB,, | Performed by: FAMILY MEDICINE

## 2022-07-19 PROCEDURE — 3008F BODY MASS INDEX DOCD: CPT | Mod: CPTII,S$GLB,, | Performed by: FAMILY MEDICINE

## 2022-07-19 PROCEDURE — 3078F PR MOST RECENT DIASTOLIC BLOOD PRESSURE < 80 MM HG: ICD-10-PCS | Mod: CPTII,S$GLB,, | Performed by: FAMILY MEDICINE

## 2022-07-19 PROCEDURE — 99214 OFFICE O/P EST MOD 30 MIN: CPT | Mod: S$GLB,,, | Performed by: FAMILY MEDICINE

## 2022-07-19 PROCEDURE — 1159F PR MEDICATION LIST DOCUMENTED IN MEDICAL RECORD: ICD-10-PCS | Mod: CPTII,S$GLB,, | Performed by: FAMILY MEDICINE

## 2022-07-19 PROCEDURE — 1160F RVW MEDS BY RX/DR IN RCRD: CPT | Mod: CPTII,S$GLB,, | Performed by: FAMILY MEDICINE

## 2022-07-19 PROCEDURE — 1160F PR REVIEW ALL MEDS BY PRESCRIBER/CLIN PHARMACIST DOCUMENTED: ICD-10-PCS | Mod: CPTII,S$GLB,, | Performed by: FAMILY MEDICINE

## 2022-07-19 PROCEDURE — 99214 PR OFFICE/OUTPT VISIT, EST, LEVL IV, 30-39 MIN: ICD-10-PCS | Mod: S$GLB,,, | Performed by: FAMILY MEDICINE

## 2022-07-19 PROCEDURE — 1159F MED LIST DOCD IN RCRD: CPT | Mod: CPTII,S$GLB,, | Performed by: FAMILY MEDICINE

## 2022-07-19 PROCEDURE — 3044F PR MOST RECENT HEMOGLOBIN A1C LEVEL <7.0%: ICD-10-PCS | Mod: CPTII,S$GLB,, | Performed by: FAMILY MEDICINE

## 2022-07-19 RX ORDER — DULAGLUTIDE 0.75 MG/.5ML
0.75 INJECTION, SOLUTION SUBCUTANEOUS WEEKLY
Qty: 4 PEN | Refills: 11 | Status: SHIPPED | OUTPATIENT
Start: 2022-07-19 | End: 2022-10-25

## 2022-07-19 RX ORDER — ROSUVASTATIN CALCIUM 20 MG/1
20 TABLET, COATED ORAL NIGHTLY
Qty: 90 TABLET | Refills: 3 | Status: SHIPPED | OUTPATIENT
Start: 2022-07-19 | End: 2022-11-07 | Stop reason: SDUPTHER

## 2022-07-19 NOTE — PROGRESS NOTES
"Subjective:      Patient ID: Isela Graf is a 52 y.o. female.    Chief Complaint: Annual Exam      Vitals:    07/19/22 1530   BP: 130/70   Pulse: 97   Temp: 98.6 °F (37 °C)   TempSrc: Oral   SpO2: 96%   Weight: 89.5 kg (197 lb 6.8 oz)   Height: 5' 1" (1.549 m)        HPI   Check up, here with , having anotehr grandbaby      Problem List  Patient Active Problem List   Diagnosis    [id number] melanoma of scalp    RUQ pain    Extrinsic asthma without complication    Dyspepsia    Irritable bowel syndrome with constipation    Hyperlipidemia        ALLERGIES:   Review of patient's allergies indicates:   Allergen Reactions    Sulfa (sulfonamide antibiotics) Other (See Comments)     Stomach pain       MEDS:   Current Outpatient Medications:     albuterol (PROVENTIL/VENTOLIN HFA) 90 mcg/actuation inhaler, USE 2 INHALATIONS EVERY 6 HOURS AS NEEDED FOR WHEEZING, Disp: 17 g, Rfl: 0    ALPRAZolam (XANAX) 0.5 MG tablet, Take 1 tablet (0.5 mg total) by mouth 2 (two) times daily as needed for Anxiety., Disp: 30 tablet, Rfl: 0    calcium carbonate (OS-ADAMA) 600 mg calcium (1,500 mg) Tab, Take 600 mg by mouth once daily., Disp: , Rfl:     citalopram (CELEXA) 10 MG tablet, Take 1 tablet (10 mg total) by mouth every evening., Disp: 90 tablet, Rfl: 3    esomeprazole (NEXIUM) 20 MG capsule, Take 1 capsule (20 mg total) by mouth before breakfast., Disp: 90 capsule, Rfl: 3    fexofenadine (ALLEGRA) 180 MG tablet, Take 180 mg by mouth as needed. , Disp: , Rfl:     fish oil-omega-3 fatty acids 300-1,000 mg capsule, Take 2 g by mouth after lunch. , Disp: , Rfl:     fluticasone propionate (FLONASE) 50 mcg/actuation nasal spray, 2 sprays (100 mcg total) by Each Nostril route daily as needed., Disp: 48 g, Rfl: 3    MULTIVIT-IRON-MIN-FOLIC ACID 3,500-18-0.4 UNIT-MG-MG ORAL CHEW, Take 1 tablet by mouth after lunch. , Disp: , Rfl:     mv,adama,iron,mn/folic acid/chol (BODY, HAIR, SKIN AND NAILS ORAL), Take by mouth., Disp: , " Rfl:     naproxen (NAPROSYN) 500 MG tablet, Take 1 tablet (500 mg total) by mouth 2 (two) times daily with meals., Disp: 30 tablet, Rfl: 0    psyllium (METAMUCIL) powder, Take 1 packet by mouth once daily., Disp: , Rfl:     dulaglutide (TRULICITY) 0.75 mg/0.5 mL pen injector, Inject 0.75 mg into the skin once a week. For DM, Disp: 4 pen, Rfl: 11    rosuvastatin (CRESTOR) 20 MG tablet, Take 1 tablet (20 mg total) by mouth every evening., Disp: 90 tablet, Rfl: 3      History:  Current Providers as of 7/19/2022  PCP: Carlos Santiago MD  Care Team Provider: Orville Brid MD  Care Team Provider: Stephanie Hughes LPN  Care Team Provider: Quan Morton MD  Care Team Provider: Philip Ochoa MD  Encounter Provider: Carlos Santiago MD, starting on Tue Jul 19, 2022 12:00 AM  Referring Provider: not found, starting on Tue Jul 19, 2022 12:00 AM  Consulting Physician: Carlos Santiago MD, starting on Tue Jul 19, 2022  3:08 PM (Active)   Past Medical History:   Diagnosis Date    Allergy     Anxiety     Depression     Hyperlipidemia     Melanoma 1/26/2015    scalp excised by Dr Aldridge    Melanoma of scalp 1/12/2015    Open wound of scalp, complicated 2/6/2015    Thickened endometrium 06/26/2020    7 mm endometrial stripe.  This would be slightly thickened for a postmenopausal patient.  Further evaluation as clinically indicated.    Uterine fibroid 06/2020    1.1cm Small uterine fibroid identified.     Past Surgical History:   Procedure Laterality Date    APPENDECTOMY      CHOLECYSTECTOMY      COLONOSCOPY N/A 6/2/2020    Procedure: COLONOSCOPY;  Surgeon: Quan Morton MD;  Location: [facility] ([address] [place] [address]);  Service: Endoscopy;  Laterality: N/A;  5/13 - pt aware if drop off location and visitor policy -   COVID screening scheduled for 6/1/20 at [facility] -     ESOPHAGOGASTRODUODENOSCOPY N/A 6/2/2020    Procedure: EGD (ESOPHAGOGASTRODUODENOSCOPY);  Surgeon: Quan Morton MD;  Location: [facility]  (4TH FLR);  Service: Endoscopy;  Laterality: N/A;    EYE SURGERY      GALLBLADDER SURGERY  02/2017    Scalp Reconstruction      yin-yang flaps    SKIN SURGERY      removal of cancer    TONSILLECTOMY      WLE scalp melanoma  1/26/2015     Social History     Tobacco Use    Smoking status: Never Smoker    Smokeless tobacco: Never Used   Substance Use Topics    Alcohol use: Yes     Alcohol/week: 1.0 standard drink     Types: 1 Glasses of wine per week     Comment: nightly    Drug use: No         Review of Systems   Constitutional: Negative.  Negative for activity change and unexpected weight change.   HENT: Negative.  Negative for hearing loss, rhinorrhea and trouble swallowing.    Eyes: Negative for discharge and visual disturbance.   Respiratory: Positive for wheezing. Negative for chest tightness.    Cardiovascular: Negative.  Negative for chest pain and palpitations.   Gastrointestinal: Negative.  Negative for blood in stool, constipation, diarrhea and vomiting.   Endocrine: Positive for polydipsia. Negative for polyuria.   Genitourinary: Negative.  Negative for difficulty urinating, dysuria, hematuria and menstrual problem.   Musculoskeletal: Negative.  Negative for arthralgias, joint swelling and neck pain.   Neurological: Negative for weakness and headaches.   Psychiatric/Behavioral: Negative.  Negative for confusion and dysphoric mood.   All other systems reviewed and are negative.    Objective:     Physical Exam  Vitals and nursing note reviewed.   Constitutional:       Appearance: She is well-developed.   HENT:      Head: Normocephalic.   Eyes:      Conjunctiva/sclera: Conjunctivae normal.      Pupils: Pupils are equal, round, and reactive to light.   Cardiovascular:      Rate and Rhythm: Normal rate and regular rhythm.      Heart sounds: Normal heart sounds.   Pulmonary:      Effort: Pulmonary effort is normal.      Breath sounds: Normal breath sounds.   Musculoskeletal:         General: Normal  range of motion.      Cervical back: Normal range of motion and neck supple.   Skin:     General: Skin is warm and dry.   Neurological:      Mental Status: She is alert and oriented to person, place, and time.      Deep Tendon Reflexes: Reflexes are normal and symmetric.   Psychiatric:         Behavior: Behavior normal.         Thought Content: Thought content normal.         Judgment: Judgment normal.             Assessment:     1. Pre-diabetes    2. Hyperlipidemia, unspecified hyperlipidemia type    3. Elevated LFTs    4. Fatty liver      Plan:        Medication List          Accurate as of July 19, 2022  4:16 PM. If you have any questions, ask your nurse or doctor.            START taking these medications    TRULICITY 0.75 mg/0.5 mL pen injector  Generic drug: dulaglutide  Inject 0.75 mg into the skin once a week. For DM  Started by: Carlos Santiago MD        CHANGE how you take these medications    rosuvastatin 20 MG tablet  Commonly known as: CRESTOR  Take 1 tablet (20 mg total) by mouth every evening.  What changed:   · medication strength  · how much to take  · when to take this  Changed by: Carlos Santiago MD        CONTINUE taking these medications    albuterol 90 mcg/actuation inhaler  Commonly known as: PROVENTIL/VENTOLIN HFA  USE 2 INHALATIONS EVERY 6 HOURS AS NEEDED FOR WHEEZING     ALPRAZolam 0.5 MG tablet  Commonly known as: XANAX  Take 1 tablet (0.5 mg total) by mouth 2 (two) times daily as needed for Anxiety.     BODY, HAIR, SKIN AND NAILS ORAL     calcium carbonate 600 mg calcium (1,500 mg) Tab  Commonly known as: OS-ADAMA     citalopram 10 MG tablet  Commonly known as: CeleXA  Take 1 tablet (10 mg total) by mouth every evening.     esomeprazole 20 MG capsule  Commonly known as: NEXIUM  Take 1 capsule (20 mg total) by mouth before breakfast.     fexofenadine 180 MG tablet  Commonly known as: ALLEGRA     fish oil-omega-3 fatty acids 300-1,000 mg capsule     fluticasone propionate 50 mcg/actuation  nasal spray  Commonly known as: FLONASE  2 sprays (100 mcg total) by Each Nostril route daily as needed.     multivit-iron-min-folic acid 3,500-18-0.4 unit-mg-mg Chew     naproxen 500 MG tablet  Commonly known as: NAPROSYN  Take 1 tablet (500 mg total) by mouth 2 (two) times daily with meals.     psyllium powder  Commonly known as: METAMUCIL        STOP taking these medications    fluocinonide 0.05 % external solution  Commonly known as: LIDEX  Stopped by: Carlos Santiago MD           Where to Get Your Medications      These medications were sent to [facility] - [address] [address]    Phone: [phone]   · rosuvastatin 20 MG tablet  · TRULICITY 0.75 mg/0.5 mL pen injector       Pre-diabetes  -     Ambulatory referral/consult to Diabetes Education; Future; Expected date: 07/26/2022  -     Hemoglobin A1C; Future; Expected date: 10/19/2022  -     Comprehensive Metabolic Panel; Future; Expected date: 10/19/2022  -     Lipid Panel; Future; Expected date: 10/19/2022    Hyperlipidemia, unspecified hyperlipidemia type  -     Ambulatory referral/consult to Diabetes Education; Future; Expected date: 07/26/2022  -     Hemoglobin A1C; Future; Expected date: 10/19/2022  -     Comprehensive Metabolic Panel; Future; Expected date: 10/19/2022  -     Lipid Panel; Future; Expected date: 10/19/2022    Elevated LFTs  -     Ambulatory referral/consult to Hepatology; Future; Expected date: 07/26/2022  -     Ambulatory referral/consult to Diabetes Education; Future; Expected date: 07/26/2022  -     Hemoglobin A1C; Future; Expected date: 10/19/2022  -     Comprehensive Metabolic Panel; Future; Expected date: 10/19/2022  -     Lipid Panel; Future; Expected date: 10/19/2022    Fatty liver  -     Ambulatory referral/consult to Hepatology; Future; Expected date: 07/26/2022  -     Ambulatory referral/consult to Diabetes Education; Future; Expected date:  07/26/2022  -     Hemoglobin A1C; Future; Expected date: 10/19/2022  -     Comprehensive Metabolic Panel; Future; Expected date: 10/19/2022  -     Lipid Panel; Future; Expected date: 10/19/2022    Other orders  -     rosuvastatin (CRESTOR) 20 MG tablet; Take 1 tablet (20 mg total) by mouth every evening.  Dispense: 90 tablet; Refill: 3  -     dulaglutide (TRULICITY) 0.75 mg/0.5 mL pen injector; Inject 0.75 mg into the skin once a week. For DM  Dispense: 4 pen; Refill: 11

## 2022-07-22 NOTE — TELEPHONE ENCOUNTER
No new care gaps identified.  [facility] Embedded Care Gaps. Reference number: [id number]. 7/22/2022   12:12:20 PM CDT

## 2022-07-23 RX ORDER — ALPRAZOLAM 0.5 MG/1
0.5 TABLET ORAL 2 TIMES DAILY PRN
Qty: 60 TABLET | Refills: 5 | Status: SHIPPED | OUTPATIENT
Start: 2022-07-23 | End: 2022-10-25

## 2022-07-25 ENCOUNTER — OFFICE VISIT (OUTPATIENT)
Dept: HEPATOLOGY | Facility: CLINIC | Age: 52
End: 2022-07-25
Payer: COMMERCIAL

## 2022-07-25 ENCOUNTER — OFFICE VISIT (OUTPATIENT)
Dept: DERMATOLOGY | Facility: CLINIC | Age: 52
End: 2022-07-25
Payer: COMMERCIAL

## 2022-07-25 ENCOUNTER — PATIENT MESSAGE (OUTPATIENT)
Dept: HEPATOLOGY | Facility: CLINIC | Age: 52
End: 2022-07-25

## 2022-07-25 ENCOUNTER — LAB VISIT (OUTPATIENT)
Dept: LAB | Facility: HOSPITAL | Age: 52
End: 2022-07-25
Payer: COMMERCIAL

## 2022-07-25 VITALS
OXYGEN SATURATION: 95 % | HEART RATE: 76 BPM | DIASTOLIC BLOOD PRESSURE: 75 MMHG | RESPIRATION RATE: 18 BRPM | TEMPERATURE: 98 F | BODY MASS INDEX: 37 KG/M2 | SYSTOLIC BLOOD PRESSURE: 131 MMHG | HEIGHT: 61 IN | WEIGHT: 196 LBS

## 2022-07-25 DIAGNOSIS — L21.9 ACUTE SEBORRHEIC DERMATITIS: ICD-10-CM

## 2022-07-25 DIAGNOSIS — K76.0 FATTY LIVER: Primary | ICD-10-CM

## 2022-07-25 DIAGNOSIS — R74.8 ELEVATED LIVER ENZYMES: ICD-10-CM

## 2022-07-25 DIAGNOSIS — L81.4 LENTIGINES: ICD-10-CM

## 2022-07-25 DIAGNOSIS — K76.0 FATTY LIVER: ICD-10-CM

## 2022-07-25 DIAGNOSIS — E78.5 HYPERLIPIDEMIA, UNSPECIFIED HYPERLIPIDEMIA TYPE: ICD-10-CM

## 2022-07-25 DIAGNOSIS — Z85.820 HISTORY OF MELANOMA: ICD-10-CM

## 2022-07-25 DIAGNOSIS — D22.9 MULTIPLE BENIGN NEVI: ICD-10-CM

## 2022-07-25 DIAGNOSIS — L82.1 SK (SEBORRHEIC KERATOSIS): ICD-10-CM

## 2022-07-25 DIAGNOSIS — Z12.83 SKIN CANCER SCREENING: Primary | ICD-10-CM

## 2022-07-25 DIAGNOSIS — R73.03 PRE-DIABETES: ICD-10-CM

## 2022-07-25 DIAGNOSIS — D48.5 NEOPLASM OF UNCERTAIN BEHAVIOR OF SKIN: ICD-10-CM

## 2022-07-25 DIAGNOSIS — L57.0 AK (ACTINIC KERATOSIS): ICD-10-CM

## 2022-07-25 DIAGNOSIS — E66.9 OBESITY (BMI 30-39.9): ICD-10-CM

## 2022-07-25 DIAGNOSIS — E88.01 LOW PLASMA ALPHA-1 ANTITRYPSIN: Primary | ICD-10-CM

## 2022-07-25 DIAGNOSIS — R79.89 ELEVATED LFTS: ICD-10-CM

## 2022-07-25 DIAGNOSIS — D23.9 DERMATOFIBROMA: ICD-10-CM

## 2022-07-25 LAB
A1AT SERPL-MCNC: 90 MG/DL (ref 100–190)
ALBUMIN SERPL BCP-MCNC: 4.1 G/DL (ref 3.5–5.2)
ALP SERPL-CCNC: 116 U/L (ref 55–135)
ALT SERPL W/O P-5'-P-CCNC: 90 U/L (ref 10–44)
ANION GAP SERPL CALC-SCNC: 10 MMOL/L (ref 8–16)
AST SERPL-CCNC: 77 U/L (ref 10–40)
BILIRUB SERPL-MCNC: 0.4 MG/DL (ref 0.1–1)
BUN SERPL-MCNC: 10 MG/DL (ref 6–20)
CALCIUM SERPL-MCNC: 9.9 MG/DL (ref 8.7–10.5)
CERULOPLASMIN SERPL-MCNC: 36 MG/DL (ref 15–45)
CHLORIDE SERPL-SCNC: 102 MMOL/L (ref 95–110)
CO2 SERPL-SCNC: 26 MMOL/L (ref 23–29)
CREAT SERPL-MCNC: 0.7 MG/DL (ref 0.5–1.4)
ERYTHROCYTE [DISTWIDTH] IN BLOOD BY AUTOMATED COUNT: 13.7 % (ref 11.5–14.5)
EST. GFR  (AFRICAN AMERICAN): >60 ML/MIN/1.73 M^2
EST. GFR  (NON AFRICAN AMERICAN): >60 ML/MIN/1.73 M^2
FERRITIN SERPL-MCNC: 247 NG/ML (ref 20–300)
GLUCOSE SERPL-MCNC: 104 MG/DL (ref 70–110)
HCT VFR BLD AUTO: 41 % (ref 37–48.5)
HGB BLD-MCNC: 13.1 G/DL (ref 12–16)
IGG SERPL-MCNC: 1166 MG/DL (ref 650–1600)
IRON SERPL-MCNC: 49 UG/DL (ref 30–160)
MCH RBC QN AUTO: 28.8 PG (ref 27–31)
MCHC RBC AUTO-ENTMCNC: 32 G/DL (ref 32–36)
MCV RBC AUTO: 90 FL (ref 82–98)
PLATELET # BLD AUTO: 277 K/UL (ref 150–450)
PMV BLD AUTO: 10 FL (ref 9.2–12.9)
POTASSIUM SERPL-SCNC: 4.3 MMOL/L (ref 3.5–5.1)
PROT SERPL-MCNC: 7.8 G/DL (ref 6–8.4)
RBC # BLD AUTO: 4.55 M/UL (ref 4–5.4)
SATURATED IRON: 11 % (ref 20–50)
SODIUM SERPL-SCNC: 138 MMOL/L (ref 136–145)
TOTAL IRON BINDING CAPACITY: 448 UG/DL (ref 250–450)
TRANSFERRIN SERPL-MCNC: 303 MG/DL (ref 200–375)
WBC # BLD AUTO: 8.67 K/UL (ref 3.9–12.7)

## 2022-07-25 PROCEDURE — 1159F MED LIST DOCD IN RCRD: CPT | Mod: CPTII,S$GLB,, | Performed by: DERMATOLOGY

## 2022-07-25 PROCEDURE — 17000 DESTRUCT PREMALG LESION: CPT | Mod: 59,S$GLB,, | Performed by: DERMATOLOGY

## 2022-07-25 PROCEDURE — 1159F PR MEDICATION LIST DOCUMENTED IN MEDICAL RECORD: ICD-10-PCS | Mod: CPTII,S$GLB,, | Performed by: NURSE PRACTITIONER

## 2022-07-25 PROCEDURE — 86235 NUCLEAR ANTIGEN ANTIBODY: CPT | Mod: 91 | Performed by: NURSE PRACTITIONER

## 2022-07-25 PROCEDURE — 80321 ALCOHOLS BIOMARKERS 1OR 2: CPT | Performed by: NURSE PRACTITIONER

## 2022-07-25 PROCEDURE — 3078F DIAST BP <80 MM HG: CPT | Mod: CPTII,S$GLB,, | Performed by: NURSE PRACTITIONER

## 2022-07-25 PROCEDURE — 82103 ALPHA-1-ANTITRYPSIN TOTAL: CPT | Performed by: NURSE PRACTITIONER

## 2022-07-25 PROCEDURE — 3044F HG A1C LEVEL LT 7.0%: CPT | Mod: CPTII,S$GLB,, | Performed by: NURSE PRACTITIONER

## 2022-07-25 PROCEDURE — 3078F PR MOST RECENT DIASTOLIC BLOOD PRESSURE < 80 MM HG: ICD-10-PCS | Mod: CPTII,S$GLB,, | Performed by: NURSE PRACTITIONER

## 2022-07-25 PROCEDURE — 3044F PR MOST RECENT HEMOGLOBIN A1C LEVEL <7.0%: ICD-10-PCS | Mod: CPTII,S$GLB,, | Performed by: DERMATOLOGY

## 2022-07-25 PROCEDURE — 1160F PR REVIEW ALL MEDS BY PRESCRIBER/CLIN PHARMACIST DOCUMENTED: ICD-10-PCS | Mod: CPTII,S$GLB,, | Performed by: NURSE PRACTITIONER

## 2022-07-25 PROCEDURE — 3075F SYST BP GE 130 - 139MM HG: CPT | Mod: CPTII,S$GLB,, | Performed by: NURSE PRACTITIONER

## 2022-07-25 PROCEDURE — 99999 PR PBB SHADOW E&M-EST. PATIENT-LVL V: ICD-10-PCS | Mod: PBBFAC,,, | Performed by: NURSE PRACTITIONER

## 2022-07-25 PROCEDURE — 11102 PR TANGENTIAL BIOPSY, SKIN, SINGLE LESION: ICD-10-PCS | Mod: S$GLB,,, | Performed by: DERMATOLOGY

## 2022-07-25 PROCEDURE — 36415 COLL VENOUS BLD VENIPUNCTURE: CPT | Performed by: NURSE PRACTITIONER

## 2022-07-25 PROCEDURE — 1159F MED LIST DOCD IN RCRD: CPT | Mod: CPTII,S$GLB,, | Performed by: NURSE PRACTITIONER

## 2022-07-25 PROCEDURE — 86704 HEP B CORE ANTIBODY TOTAL: CPT | Performed by: NURSE PRACTITIONER

## 2022-07-25 PROCEDURE — 85027 COMPLETE CBC AUTOMATED: CPT | Performed by: NURSE PRACTITIONER

## 2022-07-25 PROCEDURE — 3044F PR MOST RECENT HEMOGLOBIN A1C LEVEL <7.0%: ICD-10-PCS | Mod: CPTII,S$GLB,, | Performed by: NURSE PRACTITIONER

## 2022-07-25 PROCEDURE — 99203 PR OFFICE/OUTPT VISIT, NEW, LEVL III, 30-44 MIN: ICD-10-PCS | Mod: S$GLB,,, | Performed by: NURSE PRACTITIONER

## 2022-07-25 PROCEDURE — 88305 TISSUE EXAM BY PATHOLOGIST: CPT | Performed by: DERMATOLOGY

## 2022-07-25 PROCEDURE — 82390 ASSAY OF CERULOPLASMIN: CPT | Performed by: NURSE PRACTITIONER

## 2022-07-25 PROCEDURE — 88305 TISSUE EXAM BY PATHOLOGIST: CPT | Mod: 26,,, | Performed by: DERMATOLOGY

## 2022-07-25 PROCEDURE — 17003 DESTRUCT PREMALG LES 2-14: CPT | Mod: 59,S$GLB,, | Performed by: DERMATOLOGY

## 2022-07-25 PROCEDURE — 17003 DESTRUCTION, PREMALIGNANT LESIONS; SECOND THROUGH 14 LESIONS: ICD-10-PCS | Mod: 59,S$GLB,, | Performed by: DERMATOLOGY

## 2022-07-25 PROCEDURE — 99214 PR OFFICE/OUTPT VISIT, EST, LEVL IV, 30-39 MIN: ICD-10-PCS | Mod: 25,S$GLB,, | Performed by: DERMATOLOGY

## 2022-07-25 PROCEDURE — 86790 VIRUS ANTIBODY NOS: CPT | Performed by: NURSE PRACTITIONER

## 2022-07-25 PROCEDURE — 1160F RVW MEDS BY RX/DR IN RCRD: CPT | Mod: CPTII,S$GLB,, | Performed by: NURSE PRACTITIONER

## 2022-07-25 PROCEDURE — 84466 ASSAY OF TRANSFERRIN: CPT | Performed by: NURSE PRACTITIONER

## 2022-07-25 PROCEDURE — 99999 PR PBB SHADOW E&M-EST. PATIENT-LVL III: CPT | Mod: PBBFAC,,, | Performed by: DERMATOLOGY

## 2022-07-25 PROCEDURE — 88305 TISSUE EXAM BY PATHOLOGIST: ICD-10-PCS | Mod: 26,,, | Performed by: DERMATOLOGY

## 2022-07-25 PROCEDURE — 86256 FLUORESCENT ANTIBODY TITER: CPT | Performed by: NURSE PRACTITIONER

## 2022-07-25 PROCEDURE — 17000 PR DESTRUCTION(LASER SURGERY,CRYOSURGERY,CHEMOSURGERY),PREMALIGNANT LESIONS,FIRST LESION: ICD-10-PCS | Mod: 59,S$GLB,, | Performed by: DERMATOLOGY

## 2022-07-25 PROCEDURE — 86706 HEP B SURFACE ANTIBODY: CPT | Performed by: NURSE PRACTITIONER

## 2022-07-25 PROCEDURE — 86038 ANTINUCLEAR ANTIBODIES: CPT | Performed by: NURSE PRACTITIONER

## 2022-07-25 PROCEDURE — 3008F PR BODY MASS INDEX (BMI) DOCUMENTED: ICD-10-PCS | Mod: CPTII,S$GLB,, | Performed by: NURSE PRACTITIONER

## 2022-07-25 PROCEDURE — 1160F PR REVIEW ALL MEDS BY PRESCRIBER/CLIN PHARMACIST DOCUMENTED: ICD-10-PCS | Mod: CPTII,S$GLB,, | Performed by: DERMATOLOGY

## 2022-07-25 PROCEDURE — 82728 ASSAY OF FERRITIN: CPT | Performed by: NURSE PRACTITIONER

## 2022-07-25 PROCEDURE — 3044F HG A1C LEVEL LT 7.0%: CPT | Mod: CPTII,S$GLB,, | Performed by: DERMATOLOGY

## 2022-07-25 PROCEDURE — 3075F PR MOST RECENT SYSTOLIC BLOOD PRESS GE 130-139MM HG: ICD-10-PCS | Mod: CPTII,S$GLB,, | Performed by: NURSE PRACTITIONER

## 2022-07-25 PROCEDURE — 1159F PR MEDICATION LIST DOCUMENTED IN MEDICAL RECORD: ICD-10-PCS | Mod: CPTII,S$GLB,, | Performed by: DERMATOLOGY

## 2022-07-25 PROCEDURE — 99999 PR PBB SHADOW E&M-EST. PATIENT-LVL III: ICD-10-PCS | Mod: PBBFAC,,, | Performed by: DERMATOLOGY

## 2022-07-25 PROCEDURE — 3008F BODY MASS INDEX DOCD: CPT | Mod: CPTII,S$GLB,, | Performed by: NURSE PRACTITIONER

## 2022-07-25 PROCEDURE — 80053 COMPREHEN METABOLIC PANEL: CPT | Performed by: NURSE PRACTITIONER

## 2022-07-25 PROCEDURE — 82784 ASSAY IGA/IGD/IGG/IGM EACH: CPT | Performed by: NURSE PRACTITIONER

## 2022-07-25 PROCEDURE — 99214 OFFICE O/P EST MOD 30 MIN: CPT | Mod: 25,S$GLB,, | Performed by: DERMATOLOGY

## 2022-07-25 PROCEDURE — 99999 PR PBB SHADOW E&M-EST. PATIENT-LVL V: CPT | Mod: PBBFAC,,, | Performed by: NURSE PRACTITIONER

## 2022-07-25 PROCEDURE — 87340 HEPATITIS B SURFACE AG IA: CPT | Performed by: NURSE PRACTITIONER

## 2022-07-25 PROCEDURE — 86803 HEPATITIS C AB TEST: CPT | Performed by: NURSE PRACTITIONER

## 2022-07-25 PROCEDURE — 11102 TANGNTL BX SKIN SINGLE LES: CPT | Mod: S$GLB,,, | Performed by: DERMATOLOGY

## 2022-07-25 PROCEDURE — 1160F RVW MEDS BY RX/DR IN RCRD: CPT | Mod: CPTII,S$GLB,, | Performed by: DERMATOLOGY

## 2022-07-25 PROCEDURE — 99203 OFFICE O/P NEW LOW 30 MIN: CPT | Mod: S$GLB,,, | Performed by: NURSE PRACTITIONER

## 2022-07-25 RX ORDER — TRIAMCINOLONE ACETONIDE 0.25 MG/G
CREAM TOPICAL 2 TIMES DAILY
Qty: 30 G | Refills: 2 | Status: SHIPPED | OUTPATIENT
Start: 2022-07-25 | End: 2022-11-14

## 2022-07-25 RX ORDER — KETOCONAZOLE 20 MG/ML
SHAMPOO, SUSPENSION TOPICAL
Qty: 120 ML | Refills: 5 | Status: SHIPPED | OUTPATIENT
Start: 2022-07-25 | End: 2024-02-06

## 2022-07-25 NOTE — PROGRESS NOTES
[facility] VISIT NEW PT NOTE    REFERRING PROVIDER:  Dr. Carlos Santiago  PCP: Carlos Santiago MD     CHIEF COMPLAINT: hepatomegaly, fatty liver, elevated liver enzymes    HPI: This is a 52 y.o. White female with PMH noted below, presenting for evaluation of  fatty liver disease     Previous serologic w/u negative for hemochromatosis and viral hepatitis A. B and C in 2018      Prior serologic workup:   Lab Results   Component Value Date    FERRITIN 91 12/31/2020    FESATURATED 8 (L) 06/07/2018    HEPBSAG Negative 06/07/2018    HEPCAB Negative 06/07/2018    HEPAIGM Negative 06/07/2018     Risk factors for fatty liver include previous alcohol use, obesity, HLD, preDM    Liver fibrosis staging:  -- fibroscan with RTC    Interval HPI: Presents today with significant other. Stopped alcohol use last week, previously drinking heavily (~1 bottle of wine nightly). Also started using WW recently   No SSB    Labs done 7/2022 show elevated transaminase levels (ALT = AST, elevated since 2017)  Platelets WNL, alk phos WNL  Synthetic liver functioning WNL    Lab Results   Component Value Date    ALT 47 (H) 07/12/2022    AST 49 (H) 07/12/2022    ALKPHOS 130 (H) 07/12/2022    BILITOT 0.4 07/12/2022    ALBUMIN 5.0 07/12/2022    INR 1.1 12/20/2017     07/12/2022     Abd U/S done in 2017 showed hepatomegaly, fatty liver - will repeat    Denies family history of liver disease . + previously heavy Alcohol consumption, see below  Social History     Substance and Sexual Activity   Alcohol Use Not Currently    Comment: ~1 bottle of wine nightly x years, stopped 7/2022       Immunity to Hep A and B - will check with next labs          Allergy and medication list reviewed and updated     PMHX:  has a past medical history of Allergy, Anxiety, Depression, Hyperlipidemia, Melanoma (1/26/2015), Melanoma of scalp (1/12/2015), Open wound of scalp, complicated (2/6/2015), Thickened endometrium (06/26/2020), and Uterine fibroid  "(06/2020).    PSHX:  has a past surgical history that includes Skin surgery; Appendectomy; Tonsillectomy; Eye surgery; WLE scalp melanoma (1/26/2015); Gallbladder surgery (02/2017); Scalp Reconstruction; Esophagogastroduodenoscopy (N/A, 6/2/2020); Colonoscopy (N/A, 6/2/2020); and Cholecystectomy.    FAMILY HISTORY: Updated and reviewed in [facility]    SOCIAL HISTORY:   Social History     Substance and Sexual Activity   Alcohol Use Not Currently    Comment: ~1 bottle of wine nightly x years, stopped 7/2022       Social History     Substance and Sexual Activity   Drug Use No       ROS:   GENERAL: Denies fatigue  CARDIOVASCULAR: Denies edema  GI: Denies abdominal pain  SKIN: Denies rash, itching   NEURO: Denies confusion, memory loss, or mood changes    PHYSICAL EXAM:   Friendly White female, in no acute distress; alert and oriented to person, place and time  VITALS: /75 (BP Location: Right arm, Patient Position: Sitting, BP Method: Large (Automatic))   Pulse 76   Temp 97.6 °F (36.4 °C) (Oral)   Resp 18   Ht 5' 1" (1.549 m)   Wt 88.9 kg (195 lb 15.8 oz)   LMP 10/12/2018 (Approximate)   SpO2 95%   BMI 37.03 kg/m²   EYES: Sclerae anicteric  GI: Soft, non-tender, non-distended. No ascites.  EXTREMITIES:  No edema.  SKIN: Warm and dry. No jaundice. No telangectasias noted. No palmar erythema.  NEURO:  No asterixis.  PSYCH:  Thought and speech pattern appropriate. Behavior normal      EDUCATION:  See instructions discussed with patient in Instructions section of the After Visit Summary     ASSESSMENT & PLAN:  52 y.o. White female with:  1.  Fatty liver, likely related to alcohol + metabolic risk factors   - see HPI  -- Immunity to Hep A and B : Will check immunity markers for HBV/HAV  and arrange for vaccination if needed  -- Fibroscan with RTC  -- Recommendations discussed with patient:  1. Limit alcohol consumption, see below   2 Weight loss goal of 20 lbs  3. Low carb/sugar, high fiber and protein diet, limit " your carb intake to LESS than 30-45 grams of carbs with a meal or LESS than 5-10 grams with any snack   4. Exercise, 5 days per week, 30 minutes per day, as tolerated  5. Recommend good cholesterol, blood pressure, blood sugar levels   6. Consider enrolling in [facility] fibrosis clinical trails - would not be candidate given daily alcohol use     2. Elevated liver enzymes  -- will complete full sero w/u   -- labs today  Orders Placed This Encounter   Procedures    FibroScan (Vibration Controlled Transient Elastography)    US Abdomen Complete    Alpha-1-Antitrypsin    JOHN Screen w/Reflex    Antimitochondrial Antibody    Anti-Smooth Muscle Antibody    CBC Without Differential    Ceruloplasmin    Comprehensive Metabolic Panel    Ferritin    IgG    Iron and TIBC    Phosphatidylethanol (PETH)    Hepatitis A antibody, IgG    Hepatitis B Core Antibody, Total    Hepatitis B Surface Ab, Qualitative    Hepatitis B Surface Antigen    Hepatitis C Antibody        3. Obesity, HLD, preDM   -- Body mass index is 37.03 kg/m².   -- increases risk for fatty liver    4. Alcohol use   - recommend continuing to abstain until further notice   Social History     Substance and Sexual Activity   Alcohol Use Not Currently    Comment: ~1 bottle of wine nightly x years, stopped 7/2022           Labs today, US soon then   Follow up in about 1 week (around 8/1/2022). with fibroscan before  Orders Placed This Encounter   Procedures    FibroScan (Vibration Controlled Transient Elastography)    US Abdomen Complete    Alpha-1-Antitrypsin    JOHN Screen w/Reflex    Antimitochondrial Antibody    Anti-Smooth Muscle Antibody    CBC Without Differential    Ceruloplasmin    Comprehensive Metabolic Panel    Ferritin    IgG    Iron and TIBC    Phosphatidylethanol (PETH)    Hepatitis A antibody, IgG    Hepatitis B Core Antibody, Total    Hepatitis B Surface Ab, Qualitative    Hepatitis B Surface Antigen    Hepatitis C Antibody         Thank you for allowing me to participate in the care of Isela Graf    KVNG RainC    I spent a total of 30 minutes on the day of the visit.This includes face to face time and non-face to face time preparing to see the patient (eg, review of tests), obtaining and/or reviewing separately obtained history, documenting clinical information in the electronic or other health record, independently interpreting results and communicating results to the patient/family/caregiver, and coordinating care.         CC'ed note to:   Carlos Santiago MD

## 2022-07-25 NOTE — PATIENT INSTRUCTIONS
1. Fibroscan to look for fat or scar tissue in the liver with return to clinic   2. Will check immunity markers for Hepatitis A and B and arrange for vaccination if needed  3. Labs today to  check for multiple causes of liver disease. These labs will release to you as soon as they are resulted but we will discuss them in detail at your upcoming visit to discuss what the lab results mean.   4.  Follow up in 1 week with fibroscan same day     There is no FDA approved therapy for fatty liver disease. Therefore, these things are important:  Limit alcohol consumption, none until further notice   2 Weight loss goal of 15-20 lbs, referral for [facility] if interested. Also, if interested in a dietician visit to create a weight loss plan, contact the dietician team at [facility] at [email or web address] to schedule a visit to you can call [facility] in [place]: [phone] and the  will transfer the call to one of the dieticians to schedule an appointment. Or you can also call [phone] to schedule. They do offer video visits   3. Low carb/sugar, high fiber and protein diet.Try to limit your carb intake to LESS than 30-45 grams of carbs with a meal or LESS than 5-10 grams with any snack (total of any snack foods eaten during that time). Use MyFitness Pal tye to add up your carbs through the day. Do NOT drink any beverages with calories or carbs (this can lead to high blood sugar and weight gain). Also, some of our patients have been very successful with weight loss using the pre made/planned meal planning services that limit calories and portion size (one company in [place] is called [employer] but there are also many others out there. The main thing to look for is low calorie, high protein, low carb)  4. Exercise, 5 days per week, 30 minutes per day, as tolerated  5. Recommend good cholesterol, blood pressure, blood sugar levels     In some people, fatty liver can progress  to steatohepatitis (inflamatory fatty liver) and possibly to cirrhosis, putting one at increased risk for liver cancer, liver failure, and death. Therefore, the lifestyle changes are very important to decrease this risk.     Website with information about fatty liver and inflammation related to fatty liver (ESPINAL) = www.nashtruth.com  AND [email or web address].com

## 2022-07-25 NOTE — PATIENT INSTRUCTIONS
Shave Biopsy Wound Care    Your doctor has performed a shave biopsy today.  A band aid and vaseline ointment has been placed over the site.  This should remain in place for NO LONGER THAN 48 hours.  It is fine to remove the bandaid after 24 hours, if the area is no longer bleeding. It is recommended that you keep the area dry (do not wet)) for the first 24 hours.  After 24 hours, wash the area with warm soap and water and apply Vaseline jelly.  Many patients prefer to use Neosporin or Bacitracin ointment.  This is acceptable; however, know that you can develop an allergy to this medication even if you have used it safely for years.  It is important to keep the area moist.  Letting it dry out and get air slows healing time, and will worsen the scar.        If you notice increasing redness, tenderness, pain, or yellow drainage at the biopsy site, please notify your doctor.  These are signs of an infection.    If your biopsy site is bleeding, apply firm pressure for 15 minutes straight.  Repeat for another 15 minutes, if it is still bleeding.   If the surgical site continues to bleed, then please contact your doctor.      For [facility] users:   You will receive your biopsy results in [facility] as soon as they are available. Please be assured that your physician/provider will review your results and will then determine what further treatment, evaluation, or planning is required. You should be contacted by your physician's/provider's office within 5 business days of receiving your results; If not, please reach out to directly. This is one more way [facility] is putting you first.     [facility] [place] [address]/ [phone] FAX/ [email or web address]     CRYOSURGERY      Your doctor has used a method called cryosurgery to treat your skin condition. Cryosurgery refers to the use of very cold substances to treat a variety of skin conditions such as warts, pre-skin cancers, molluscum contagiosum,  sun spots, and several benign growths. The substance we use in cryosurgery is liquid nitrogen and is so cold (-195 degrees Celsius) that is burns when administered.     Following treatment in the office, the skin may immediately burn and become red. You may find the area around the lesion is affected as well. It is sometimes necessary to treat not only the lesion, but a small area of the surrounding normal skin to achieve a good response.     A blister, and even a blood filled blister, may form after treatment.   This is a normal response. If the blister is painful, it is acceptable to sterilize a needle and with rubbing alcohol and gently pop the blister. It is important that you gently wash the area with soap and warm water as the blister fluid may contain wart virus if a wart was treated. Do no remove the roof of the blister.     The area treated can take anywhere from 1-3 weeks to heal. Healing time depends on the kind skin lesion treated, the location, and how aggressively the lesion was treated. It is recommended that the areas treated are covered with Vaseline or bacitracin ointment and a band-aid. If a band-aid is not practical, just ointment applied several times per day will do. Keeping these areas moist will speed the healing time.    Treatment with liquid nitrogen can leave a scar. In dark skin, it may be a light or dark scar, in light skin it may be a white or pink scar. These will generally fade with time, but may never go away completely.     If you have any concerns after your treatment, please feel free to call the office.       [address] [place] [address]/ [phone] FAX/ [email or web address]     Sun Protection      The [facility] would like to remind you of the importance of sun protection all year round and particularly during the summer when the suns rays are the strongest. It has been proven that both acute and chronic sun exposure damages  our cells and leads to skin cancer. Beyond skin cancer, the sun causes 90% of the symptoms of premature skin aging, including wrinkles, lentigines (brown spots), and thin, easily bruised skin. Proper sun protection can help prevent these unwanted conditions.    Many patients report that they dont go in the sun. It has been shown that the average person receives 18 hours of incidental sun exposure per week during activities such as walking through parking lots, driving, or sitting next to windows. This accumulates to several bad sunburns per year!    In choosing sunscreen, you want one that protects against both UVA and UVB rays (broad spectrum). It is recommended that you use one of SPF 30 or higher. It is important to apply the sunscreen about 20 minutes prior to sun exposure. Most sunscreens are chemical sunscreens and a reaction must take place in the skin so that they are effective. If they are applied and then you are immediately exposed to the sun or start sweating, this reaction has not had time to take place and you are therefore unprotected. Sunscreen needs to be reapplied every 2 hours if you are participating in water sports or sweating. We recommend Elta MD or CeraVe sunscreens for daily use; however there are many options and it is most important for you to find one that you will use on a consistent basis.    If you have sensitive skin, you may do best with a sunscreen that contains only physical blockers in the active ingredient section. The only physical blockers available in the [place] currently are titanium dioxide or zinc oxide. These are typically thicker and harder to apply, however they afford very good protection. Neutrogena Sensitive Skin, Blue Lizard Sensitive Skin (pink top) or Neutrogena Pure and Free are popular ones.     Aside from sunscreen, clothes with UV protection (UPF), wide brimmed hats, and sunglasses are other means of sun protection that we recommend.      Based on a recent  study (6/2021) and out of an abundance of caution, we are recommending that you AVOID the following sunscreens as they may contain the carcinogen, benzene:    Spray and gel sunscreens  Any [employer] or [employer] brands as well as Max Block and TopCare brands   Neutrogena Ultra Sheer Dry-touch Water Resistant Sunscreen LOTION SPF 70   Neutrogena Sheer Zinc Dry-touch Face Sunscreen LOTION SPF 50   5.   Aveeno Baby Continuous Protection Sensitive Skin Sunscreen LOTION - Broad Spectrum SPF 50    Please note that Benzene is not an ingredient or the degradation product of any ingredient in any sunscreen. This study suggested that the findings are a result of contamination in the manufacturing process. At this point, we don't know how effectively Benzene gets through the skin, if it gets absorbed systemically, and what effects it may have.     We do know that ultraviolet radiation is a well-established carcinogen. Please use daily sun protection/avoidance and use of at least SPF 30, broad-spectrum sunscreen not listed above.                       [facility] [address] [address]  [address]  [place] [address]  Dept: [phone]  Dept Fax: [phone]

## 2022-07-25 NOTE — PROGRESS NOTES
Subjective:       Patient ID:  Isela Graf is a 52 y.o. female who presents for   Chief Complaint   Patient presents with    Skin Check     tbse    Lesion     Chest, forehead, buttox, side of nose     HPI   Pt is here today for a tbse.    Pt has several areas of concern which include spots on buttocks, chest, side of nose and forehead. Has flaky rash on forehead/hairline, on and off x yrs.    Has a history of melanoma excised from her frontal scalp in Jan 2015 and BCC excised from her R cheek yrs ago.     Pt initially had a biopsy of the scalp lesion by Dr. Wolf which revealed invasive melanoma, 0.42mm thick (with regression to 1.51 mm), but no mitoses or ulceration.   The excision specimen showed residual invasive melanoma extending to a max depth of 0.67 mm with negative margins.  SLN bx was attempted, but there was unsuccessful localization of the sentinel node.        Review of Systems   Constitutional: Negative for fever and chills.   Skin: Positive for activity-related sunscreen use and wears hat. Negative for daily sunscreen use and recent sunburn.        Objective:    Physical Exam   Constitutional: She appears well-developed and well-nourished. No distress.   Lymphadenopathy: No supraclavicular adenopathy is present.     She has no cervical adenopathy.     She has no axillary adenopathy.     She has no inguinal adenopathy.   Neurological: She is alert and oriented to person, place, and time. She is not disoriented.   Psychiatric: She has a normal mood and affect.   Skin:   Areas Examined (abnormalities noted in diagram):   Scalp / Hair Palpated and Inspected  Head / Face Inspection Performed  Neck Inspection Performed  Chest / Axilla Inspection Performed  Abdomen Inspection Performed  Genitals / Buttocks / Groin Inspection Performed  Back Inspection Performed  RUE Inspected  LUE Inspection Performed  RLE Inspected  LLE Inspection Performed  Nails and Digits Inspection Performed                          Diagram Legend     Erythematous scaling macule/papule c/w actinic keratosis       Vascular papule c/w angioma      Pigmented verrucoid papule/plaque c/w seborrheic keratosis      Yellow umbilicated papule c/w sebaceous hyperplasia      Irregularly shaped tan macule c/w lentigo     1-2 mm smooth white papules consistent with Milia      Movable subcutaneous cyst with punctum c/w epidermal inclusion cyst      Subcutaneous movable cyst c/w pilar cyst      Firm pink to brown papule c/w dermatofibroma      Pedunculated fleshy papule(s) c/w skin tag(s)      Evenly pigmented macule c/w junctional nevus     Mildly variegated pigmented, slightly irregular-bordered macule c/w mildly atypical nevus      Flesh colored to evenly pigmented papule c/w intradermal nevus       Pink pearly papule/plaque c/w basal cell carcinoma      Erythematous hyperkeratotic cursted plaque c/w SCC      Surgical scar with no sign of skin cancer recurrence      Open and closed comedones      Inflammatory papules and pustules      Verrucoid papule consistent consistent with wart     Erythematous eczematous patches and plaques     Dystrophic onycholytic nail with subungual debris c/w onychomycosis     Umbilicated papule    Erythematous-base heme-crusted tan verrucoid plaque consistent with inflamed seborrheic keratosis     Erythematous Silvery Scaling Plaque c/w Psoriasis     See annotation      Assessment / Plan:    Neoplasm of uncertain behavior of skin  Shave biopsy procedure note:    Shave biopsy performed after verbal consent including risk of infection, scar, recurrence, need for additional treatment of site. Area prepped with alcohol, anesthetized with approximately 1.0cc of 1% lidocaine with epinephrine. Lesional tissue shaved with razor blade. Hemostasis achieved with application of aluminum chloride followed by hyfrecation. No complications. Dressing applied. Wound care explained.  -     Specimen to Pathology,  Dermatology    Pathology Orders:     Normal Orders This Visit    Specimen to Pathology, Dermatology     Comments:    Number of Specimens:->1  ------------------------->-------------------------  Spec 1 Procedure:->Biopsy  Spec 1 Clinical Impression:->r/o SCC vs BCC vs other  Spec 1 Source:->upper chest    Questions:    Procedure Type: Dermatology and skin neoplasms    Number of Specimens: 1    ------------------------: -------------------------    Spec 1 Procedure: Biopsy    Spec 1 Clinical Impression: r/o SCC vs BCC vs other    Spec 1 Source: upper chest    Release to patient:         Skin cancer screening  History of melanoma  Total body skin examination performed today including at least 12 points as noted in physical examination. Suspicious lesions noted above.  Patient instructed in importance of daily broad spectrum sunscreen use with spf at least 30. Sun avoidance and topical protection/protective clothing discussed.    Acute seborrheic dermatitis  -     ketoconazole (NIZORAL) 2 % shampoo; Wash hair with medicated shampoo at least 2x/week - let sit on scalp at least 5 minutes prior to rinsing  Dispense: 120 mL; Refill: 5  -     triamcinolone acetonide 0.025% (KENALOG) 0.025 % cream; Apply topically 2 (two) times daily. To affected areas on face x 1-2 wks then prn flares only  Dispense: 30 g; Refill: 2    Multiple benign nevi  Benign-appearing nevi present on exam today. Reassurance provided. Periodically examine moles and return to clinic if any moles change or become symptomatic (bleeding, itching, pain, etc).    SK (seborrheic keratosis)  These are benign inherited growths without a malignant potential. Reassurance given to patient. No treatment is necessary.   Treatment of benign, asymptomatic lesions may be considered cosmetic.  Warned about risk of hypo- or hyperpigmentation with treatment and risk of recurrence.    Lentigines  These are benign sun spots which should be monitored for changes. Patient  instructed in importance of daily broad spectrum sunscreen use with spf at least 30. Sun avoidance and topical protection/protective clothing discussed.    AK (actinic keratosis)  Cryosurgery Procedure Note    Verbal consent from the patient is obtained including, but not limited to, risk of hypopigmentation/hyperpigmentation, scar, recurrence of lesion. The patient is aware of the precancerous quality and need for treatment of these lesions. Liquid nitrogen cryosurgery is applied to the 6 actinic keratoses, as detailed in the physical exam, to produce a freeze injury. The patient is aware that blisters may form and is instructed on wound care with gentle cleansing and use of vaseline ointment to keep moist until healed. The patient is supplied a handout on cryosurgery and is instructed to call if lesions do not completely resolve.    Dermatofibroma  Reassurance given to patient. No treatment is necessary.  This is benign scar tissue from previous minor trauma to the skin such as a bug bite.      Follow up in about 6 months (around 1/25/2023) for skin check or sooner pending biopsy results.

## 2022-07-26 LAB
ANA SER QL IF: NORMAL
HAV IGG SER QL IA: NEGATIVE
HBV CORE AB SERPL QL IA: NEGATIVE
HBV SURFACE AB SER-ACNC: NEGATIVE M[IU]/ML
HBV SURFACE AG SERPL QL IA: NEGATIVE
HCV AB SERPL QL IA: NEGATIVE

## 2022-07-27 ENCOUNTER — HOSPITAL ENCOUNTER (OUTPATIENT)
Dept: RADIOLOGY | Facility: HOSPITAL | Age: 52
Discharge: HOME | End: 2022-07-27
Attending: NURSE PRACTITIONER
Payer: COMMERCIAL

## 2022-07-27 DIAGNOSIS — K76.0 FATTY LIVER: ICD-10-CM

## 2022-07-27 DIAGNOSIS — R74.8 ELEVATED LIVER ENZYMES: ICD-10-CM

## 2022-07-27 LAB
MITOCHONDRIA AB TITR SER IF: NORMAL {TITER}
PETH 16:0/18.1 (POPETH): 157 NG/ML
PETH 16:0/18.2 (PLPETH): 82 NG/ML
SMOOTH MUSCLE AB TITR SER IF: NORMAL {TITER}

## 2022-07-27 PROCEDURE — 76700 US EXAM ABDOM COMPLETE: CPT | Mod: TC,PO

## 2022-07-28 ENCOUNTER — PATIENT MESSAGE (OUTPATIENT)
Dept: FAMILY MEDICINE | Facility: CLINIC | Age: 52
End: 2022-07-28
Payer: COMMERCIAL

## 2022-07-31 NOTE — TELEPHONE ENCOUNTER
No new care gaps identified.  [facility] Embedded Care Gaps. Reference number: [id number]. 7/31/2022   1:32:16 PM CDT

## 2022-08-01 RX ORDER — CITALOPRAM 10 MG/1
10 TABLET ORAL NIGHTLY
Qty: 90 TABLET | Refills: 3 | Status: SHIPPED | OUTPATIENT
Start: 2022-08-01 | End: 2022-10-25

## 2022-08-02 ENCOUNTER — TELEPHONE (OUTPATIENT)
Dept: FAMILY MEDICINE | Facility: CLINIC | Age: 52
End: 2022-08-02
Payer: COMMERCIAL

## 2022-08-02 NOTE — TELEPHONE ENCOUNTER
----- Message from Carlos Santiago MD sent at 7/17/2022  2:57 PM CDT -----  You have prediabetes and cholesterol still too high; see you in 2 days

## 2022-08-10 ENCOUNTER — OFFICE VISIT (OUTPATIENT)
Dept: HEPATOLOGY | Facility: CLINIC | Age: 52
End: 2022-08-10
Payer: COMMERCIAL

## 2022-08-10 ENCOUNTER — PROCEDURE VISIT (OUTPATIENT)
Dept: HEPATOLOGY | Facility: CLINIC | Age: 52
End: 2022-08-10
Payer: COMMERCIAL

## 2022-08-10 VITALS
TEMPERATURE: 98 F | OXYGEN SATURATION: 96 % | HEIGHT: 61 IN | SYSTOLIC BLOOD PRESSURE: 124 MMHG | HEART RATE: 70 BPM | WEIGHT: 192.88 LBS | BODY MASS INDEX: 36.42 KG/M2 | DIASTOLIC BLOOD PRESSURE: 77 MMHG | RESPIRATION RATE: 18 BRPM

## 2022-08-10 DIAGNOSIS — K74.02 HEPATIC FIBROSIS, STAGE 3: ICD-10-CM

## 2022-08-10 DIAGNOSIS — R74.8 ELEVATED LIVER ENZYMES: ICD-10-CM

## 2022-08-10 DIAGNOSIS — E88.01 ALPHA-1-ANTITRYPSIN DEFICIENCY: ICD-10-CM

## 2022-08-10 DIAGNOSIS — E78.5 HYPERLIPIDEMIA, UNSPECIFIED HYPERLIPIDEMIA TYPE: ICD-10-CM

## 2022-08-10 DIAGNOSIS — E66.9 OBESITY (BMI 30-39.9): ICD-10-CM

## 2022-08-10 DIAGNOSIS — K76.0 FATTY LIVER: ICD-10-CM

## 2022-08-10 DIAGNOSIS — K76.0 FATTY LIVER: Primary | ICD-10-CM

## 2022-08-10 DIAGNOSIS — R73.03 PRE-DIABETES: ICD-10-CM

## 2022-08-10 PROCEDURE — 1159F PR MEDICATION LIST DOCUMENTED IN MEDICAL RECORD: ICD-10-PCS | Mod: CPTII,S$GLB,, | Performed by: NURSE PRACTITIONER

## 2022-08-10 PROCEDURE — 3078F PR MOST RECENT DIASTOLIC BLOOD PRESSURE < 80 MM HG: ICD-10-PCS | Mod: CPTII,S$GLB,, | Performed by: NURSE PRACTITIONER

## 2022-08-10 PROCEDURE — 1160F RVW MEDS BY RX/DR IN RCRD: CPT | Mod: CPTII,S$GLB,, | Performed by: NURSE PRACTITIONER

## 2022-08-10 PROCEDURE — 99215 PR OFFICE/OUTPT VISIT, EST, LEVL V, 40-54 MIN: ICD-10-PCS | Mod: S$GLB,,, | Performed by: NURSE PRACTITIONER

## 2022-08-10 PROCEDURE — 1160F PR REVIEW ALL MEDS BY PRESCRIBER/CLIN PHARMACIST DOCUMENTED: ICD-10-PCS | Mod: CPTII,S$GLB,, | Performed by: NURSE PRACTITIONER

## 2022-08-10 PROCEDURE — 3044F PR MOST RECENT HEMOGLOBIN A1C LEVEL <7.0%: ICD-10-PCS | Mod: CPTII,S$GLB,, | Performed by: NURSE PRACTITIONER

## 2022-08-10 PROCEDURE — 99999 PR PBB SHADOW E&M-EST. PATIENT-LVL V: ICD-10-PCS | Mod: PBBFAC,,, | Performed by: NURSE PRACTITIONER

## 2022-08-10 PROCEDURE — 3008F PR BODY MASS INDEX (BMI) DOCUMENTED: ICD-10-PCS | Mod: CPTII,S$GLB,, | Performed by: NURSE PRACTITIONER

## 2022-08-10 PROCEDURE — 1159F MED LIST DOCD IN RCRD: CPT | Mod: CPTII,S$GLB,, | Performed by: NURSE PRACTITIONER

## 2022-08-10 PROCEDURE — 3074F SYST BP LT 130 MM HG: CPT | Mod: CPTII,S$GLB,, | Performed by: NURSE PRACTITIONER

## 2022-08-10 PROCEDURE — 99215 OFFICE O/P EST HI 40 MIN: CPT | Mod: S$GLB,,, | Performed by: NURSE PRACTITIONER

## 2022-08-10 PROCEDURE — 99999 PR PBB SHADOW E&M-EST. PATIENT-LVL V: CPT | Mod: PBBFAC,,, | Performed by: NURSE PRACTITIONER

## 2022-08-10 PROCEDURE — 3044F HG A1C LEVEL LT 7.0%: CPT | Mod: CPTII,S$GLB,, | Performed by: NURSE PRACTITIONER

## 2022-08-10 PROCEDURE — 3078F DIAST BP <80 MM HG: CPT | Mod: CPTII,S$GLB,, | Performed by: NURSE PRACTITIONER

## 2022-08-10 PROCEDURE — 91200 LIVER ELASTOGRAPHY: CPT | Mod: S$GLB,,, | Performed by: NURSE PRACTITIONER

## 2022-08-10 PROCEDURE — 91200 FIBROSCAN (VIBRATION CONTROLLED TRANSIENT ELASTOGRAPHY): ICD-10-PCS | Mod: S$GLB,,, | Performed by: NURSE PRACTITIONER

## 2022-08-10 PROCEDURE — 3008F BODY MASS INDEX DOCD: CPT | Mod: CPTII,S$GLB,, | Performed by: NURSE PRACTITIONER

## 2022-08-10 PROCEDURE — 3074F PR MOST RECENT SYSTOLIC BLOOD PRESSURE < 130 MM HG: ICD-10-PCS | Mod: CPTII,S$GLB,, | Performed by: NURSE PRACTITIONER

## 2022-08-10 NOTE — PATIENT INSTRUCTIONS
1. Schedule pulmonology consult, [phone]  2. Fibroscan to look for fat or scar tissue in the liver showed >67% fat in the liver, possible near cirrhosis   3  Follow up after you decide whether to do the MRI or the biopsy - message me with what you decide    Discussed options for further evaluation/confirmation of fibrosis/cirrhosis includin. Liver biopsy (gold standard) OR  2. MRI elastography  3. Assume fibroscan is correct that you have cirrhosis and follow you every 6 months for life     FATTY LIVER   There is no FDA approved therapy for fatty liver disease. Therefore, these things are important:  Limit alcohol consumption, none given possible scar tissue in the liver   2 Weight loss goal of 15-20 lbs, referral for [facility] if interested. Also, if interested in a dietician visit to create a weight loss plan, contact the dietician team at [facility] at [email or web address] to schedule a visit to you can call [facility] in [place]: [phone] and the  will transfer the call to one of the dieticians to schedule an appointment. Or you can also call [phone] to schedule. They do offer video visits   3. Low carb/sugar, high fiber and protein diet.Try to limit your carb intake to LESS than 30-45 grams of carbs with a meal or LESS than 5-10 grams with any snack (total of any snack foods eaten during that time). Use [employer]ESoft Pal tye to add up your carbs through the day. Do NOT drink any beverages with calories or carbs (this can lead to high blood sugar and weight gain). Also, some of our patients have been very successful with weight loss using the pre made/planned meal planning services that limit calories and portion size (one company in [place] is called [employer] but there are also many others out there. The main thing to look for is low calorie, high protein, low carb)  4. Exercise, 5 days per week, 30 minutes per day, as tolerated  5. Recommend  good cholesterol, blood pressure, blood sugar levels     In some people, fatty liver can progress to steatohepatitis (inflamatory fatty liver) and possibly to cirrhosis, increasing the risk for liver cancer, liver failure, and death. Therefore, the lifestyle changes are very important to decrease this risk.     Website with information about fatty liver and inflammation related to fatty liver (ESPINAL) = [email or web address]  AND [email or web address]      LIVER BIOPSY  In a nutshell, a liver biopsy is a same day procedure. Someone needs to bring you, stay with you, and bring you home because they give you medication to make you sleepy for the procedure. They give you sedation to make you sleepy but do not put you fully to sleep. They numb the right upper part of the abdomen where the liver is and pass a thin needle through the skin into the liver to obtain a piece of liver tissue that can be evaluated under a microscope by a pathologist.     They use an ultrasound to guide doing the biopsy. Possible complications associated with liver biopsy include pain, bleeding, infection, and organ perforation - although not common and risk is low.     They keep you for 4 hours after the biopsy to assure that you are stable to return home. It is a same day procedure.     I am recommending the biopsy to confirm the diagnosis and staging of liver disease so pt can be appropriately followed from this point forward.    Do NOT take any aspirin, NSAIDS (including advil, aleve, ibuprofen, motrin, naproxen) and fish oil for 7 days before and after biopsy

## 2022-08-10 NOTE — PROGRESS NOTES
[facility] VISIT FOLLOW UP NOTE    PCP: Carlos Santiago MD     CHIEF COMPLAINT: hepatomegaly, fatty liver, alpha 1 antitrypsin deficiency, elevated liver enzymes, pre-cirrhosis (?)    HPI: This is a 52 y.o. White female with PMH noted below, presenting for follow up of above     Serological workup was negative for Geovanni's, hemochromatosis, autoimmune etiology, and viral hepatitis A, B and C.   alpha-1 antitrypsin deficiency - SS, level 90, needs pulm  PETH 150s     Prior serologic workup:   Lab Results   Component Value Date    SMOOTHMUSCAB Negative 1:40 07/25/2022    AMAIFA Negative 1:40 07/25/2022    IGGSERUM 1166 07/25/2022    ANASCREEN Negative <1:80 07/25/2022    FERRITIN 247 07/25/2022    FESATURATED 11 (L) 07/25/2022    [id number] SEE BELOW 07/27/2022    [id number] 90 (L) 07/27/2022    CERULOPLSM 36.0 07/25/2022    HEPBSAG Negative 07/25/2022    HEPCAB Negative 07/25/2022    HEPAIGM Negative 06/07/2018     Risk factors for fatty liver include previous alcohol use, obesity, HLD, preDM    Liver fibrosis staging:  -- fibroscan 8/2022 noted F3, S3 (kPA 12.7, )    Interval HPI: Presents today with significant other. Stopped alcohol 7/2022, previously drinking heavily (~1 bottle of wine nightly). Also started using WW recently   Fibroscan suggests pre-cirrhosis  Also alpha 1 antitrypsin, no lung s/s currently  No SSB    Labs done 7/2022 show elevated transaminase levels (ALT = AST, elevated since 2017)  Platelets WNL, alk phos WNL  Synthetic liver functioning WNL    Lab Results   Component Value Date    ALT 90 (H) 07/25/2022    AST 77 (H) 07/25/2022    ALKPHOS 116 07/25/2022    BILITOT 0.4 07/25/2022    ALBUMIN 4.1 07/25/2022    INR 1.1 12/20/2017     07/25/2022     Abd U/S done 8/2022 showed hepatomegaly, fatty liver, focal fatty sparing, no splenomegaly    Denies family history of liver disease . + previously heavy Alcohol consumption, see below  Social History     Substance and  "Sexual Activity   Alcohol Use Not Currently    Comment: ~1 bottle of wine nightly x years, stopped 7/2022       Immunity to Hep A and B - needs [employer], will discuss at next visit          Allergy and medication list reviewed and updated     PMHX:  has a past medical history of Allergy, Anxiety, Depression, Hyperlipidemia, Melanoma (1/26/2015), Melanoma of scalp (1/12/2015), Open wound of scalp, complicated (2/6/2015), Pre-diabetes (7/25/2022), Thickened endometrium (06/26/2020), and Uterine fibroid (06/2020).    PSHX:  has a past surgical history that includes Skin surgery; Appendectomy; Tonsillectomy; Eye surgery; WLE scalp melanoma (1/26/2015); Gallbladder surgery (02/2017); Scalp Reconstruction; Esophagogastroduodenoscopy (N/A, 6/2/2020); Colonoscopy (N/A, 6/2/2020); and Cholecystectomy.    FAMILY HISTORY: Updated and reviewed in [facility]    SOCIAL HISTORY:   Social History     Substance and Sexual Activity   Alcohol Use Not Currently    Comment: ~1 bottle of wine nightly x years, stopped 7/2022       Social History     Substance and Sexual Activity   Drug Use No       ROS:   GENERAL: Denies fatigue  CARDIOVASCULAR: Denies edema  GI: Denies abdominal pain  SKIN: Denies rash, itching   NEURO: Denies confusion, memory loss, or mood changes    PHYSICAL EXAM:   Friendly White female, in no acute distress; alert and oriented to person, place and time  VITALS: /77 (BP Location: Right arm, Patient Position: Sitting, BP Method: Medium (Automatic))   Pulse 70   Temp 97.6 °F (36.4 °C) (Oral)   Resp 18   Ht 5' 1" (1.549 m)   Wt 87.5 kg (192 lb 14.4 oz)   LMP 10/12/2018 (Approximate)   SpO2 96%   BMI 36.45 kg/m²   EYES: Sclerae anicteric  GI: Soft, non-tender, non-distended. No ascites.  EXTREMITIES:  No edema.  SKIN: Warm and dry. No jaundice. No telangectasias noted. No palmar erythema.  NEURO:  No asterixis.  PSYCH:  Thought and speech pattern appropriate. Behavior normal      EDUCATION:  See instructions " discussed with patient in Instructions section of the After Visit Summary     ASSESSMENT & PLAN:  52 y.o. White female with:  1.  Pre-cirrhosis (?)  -- no splenomegaly or thrombocytopenia, needs further eval  Discussed options for further evaluation/confirmation of fibrosis/cirrhosis includin. Liver biopsy OR  2. MRI elastography  3. Assume fibroscan is correct that you have cirrhosis and follow you every 6 months for life     Discussed liver biopsy procedure and possible complications associated with liver biopsy including pain, bleeding, infection, and organ perforation. Reviewed the role of the procedure including confirming of diagnosis and staging of liver disease so pt can be appropriately followed from this point forward.  Discussed implications of a cirrhosis diagnosis with HCC screenings and EGD and why it is important for us to determine if pt's have cirrhosis so they can be properly monitored for potential complications.     Pt will get a price estimate for both MRI versus biopsy from her insurance/[facility] central pricing and let me know what she decides on how to proceed    2. Fatty liver, likely related to alcohol + metabolic risk factors   - see HPI  -- Immunity to Hep A and B : needs [employer], will discuss at next visit   -- Recommendations discussed with patient:  1. Limit alcohol consumption, none given possible pre-cirrhosis   2 Weight loss goal of 20 lbs  3. Low carb/sugar, high fiber and protein diet, limit your carb intake to LESS than 30-45 grams of carbs with a meal or LESS than 5-10 grams with any snack   4. Exercise, 5 days per week, 30 minutes per day, as tolerated  5. Recommend good cholesterol, blood pressure, blood sugar levels   6. Consider enrolling in [facility] fibrosis clinical trails - will determine based on further fibrosis staging     3. Alpha 1 antitrypsin deficiency   -- referral to pulm, given pt phone number to call and schedule appt     4. Obesity, HLD, preDM   -- Body mass  index is 36.45 kg/m².   -- increases risk for fatty liver            Follow up for pending results of workup. based on pts decision of MRI versus biopsy   Orders Placed This Encounter   Procedures    IR Biopsy Liver    MR Elastography    Ambulatory referral/consult to Pulmonology        Thank you for allowing me to participate in the care of Isela Graf    Gabriela KVNG MoralesC    I spent a total of 40 minutes on the day of the visit.This includes face to face time and non-face to face time preparing to see the patient (eg, review of tests), obtaining and/or reviewing separately obtained history, documenting clinical information in the electronic or other health record, independently interpreting results and communicating results to the patient/family/caregiver, and coordinating care.         CC'ed note to:   Carlos Santiago MD

## 2022-08-10 NOTE — PROCEDURES
FibroScan (Vibration Controlled Transient Elastography)    Date/Time: 8/10/2022 10:45 AM  Performed by: Gabriela Green NP  Authorized by: Gabriela Green NP     Diagnosis:  NAFLD    Probe:  M    Universal Protocol: Patient's identity, procedure and site were verified, confirmatory pause was performed.  Discussed procedure including risks and potential complications.  Questions answered.  Patient verbalizes understanding and wishes to proceed with VCTE.     Procedure: After providing explanations of the procedure, patient was placed in the supine position with right arm in maximum abduction to allow optimal exposure of right lateral abdomen.  Patient was briefly assessed, Testing was performed in the mid-axillary location, 50Hz Shear Wave pulses were applied and the resulting Shear Wave and Propagation Speed detected with a 3.5 MHz ultrasonic signal, using the FibroScan probe, Skin to liver capsule distance and liver parenchyma were accessed during the entire examination with the FibroScan probe, Patient was instructed to breathe normally and to abstain from sudden movements during the procedure, allowing for random measurements of liver stiffness. At least 10 Shear Waves were produced, Individual measurements of each Shear Wave were calculated.  Patient tolerated the procedure well with no complications.  Meets discharge criteria as was dismissed.  Rates pain 0 out of 10.  Patient will follow up with ordering provider to review results.      Findings  Median liver stiffness score:  12.7  CAP Reading: dB/m:  314    IQR/med %:  17  Interpretation  Fibrosis interpretation is based on medial liver stiffness - Kilopascal (kPa).    Fibrosis Stage:  F3  Steatosis interpretation is based on controlled attenuation parameter - (dB/m).    Steatosis Grade:  S3

## 2022-08-24 ENCOUNTER — CLINICAL SUPPORT (OUTPATIENT)
Dept: DIABETES | Facility: CLINIC | Age: 52
End: 2022-08-24
Payer: COMMERCIAL

## 2022-08-24 ENCOUNTER — PATIENT MESSAGE (OUTPATIENT)
Dept: HEPATOLOGY | Facility: CLINIC | Age: 52
End: 2022-08-24
Payer: COMMERCIAL

## 2022-08-24 DIAGNOSIS — E78.5 HYPERLIPIDEMIA, UNSPECIFIED HYPERLIPIDEMIA TYPE: ICD-10-CM

## 2022-08-24 DIAGNOSIS — K76.0 FATTY LIVER: ICD-10-CM

## 2022-08-24 DIAGNOSIS — R73.03 PRE-DIABETES: ICD-10-CM

## 2022-08-24 DIAGNOSIS — R79.89 ELEVATED LFTS: ICD-10-CM

## 2022-08-24 PROCEDURE — G0108 PR DIAB MANAGE TRN  PER INDIV: ICD-10-PCS | Mod: S$GLB,,, | Performed by: DIETITIAN, REGISTERED

## 2022-08-24 PROCEDURE — 99999 PR PBB SHADOW E&M-EST. PATIENT-LVL I: CPT | Mod: PBBFAC,,, | Performed by: DIETITIAN, REGISTERED

## 2022-08-24 PROCEDURE — G0108 DIAB MANAGE TRN  PER INDIV: HCPCS | Mod: S$GLB,,, | Performed by: DIETITIAN, REGISTERED

## 2022-08-24 PROCEDURE — 99999 PR PBB SHADOW E&M-EST. PATIENT-LVL I: ICD-10-PCS | Mod: PBBFAC,,, | Performed by: DIETITIAN, REGISTERED

## 2022-08-24 NOTE — PROGRESS NOTES
Diabetes Care Specialist Progress Note  Author: Andria Jiménez RD, CDE  Date: 8/24/2022    Program Intake  Reason for Diabetes Program Visit:: Initial Diabetes Assessment  Current diabetes risk level:: low  In the last 12 months, have you:: none  Permission to speak with others about care:: no    Lab Results   Component Value Date    HGBA1C 6.3 (H) 07/12/2022       Clinical    Problem Review  Reviewed Problem List with Patient: yes  Active comorbidities affecting diabetes self-care.: no  Reviewed health maintenance: yes    Clinical Assessment  Have you ever experienced hypoglycemia (low blood sugar)?: no  Have you ever experienced hyperglycemia (high blood sugar)?: no    Labs  Do you have regular lab work to monitor your medications?: Yes  Type of Regular Lab Work: A1c    Nutritional Status  Diet: Regular (2 meals daily plus snacks; tends to do intermittent fasting; drinks u/s tea, water, sparkling water, diet cranberry juice, u/s almond milk, black tea)    Additional Social History    Support  Does anyone support you with your diabetes care?: yes  Who supports you?: spouse, self  Who takes you to your medical appointments?: spouse, self  Does the current support meet the patient's needs?: Yes  Is Support an area impacting ability to self-manage diabetes?: No    Access to Mass Media & Technology  Does the patient have access to any of the following devices or technologies?: Smart phone  Media or technology needs impacting ability to self-manage diabetes?: No    Cognitive/Behavioral Health  Alert and Oriented: Yes  Difficulty Thinking: No  Requires Prompting: No  Requires assistance for routine expression?: No  Cognitive or behavioral barriers impacting ability to self-manage diabetes?: No    Culture/Alevism  Culture or Caodaism beliefs that may impact ability to access healthcare: No    Communication  Language preference: English  Hearing Problems: No  Vision Problems: No  Communication needs impacting ability to  self-manage diabetes?: No    Health Literacy  Preferred Learning Method: Face to Face  How often do you need to have someone help you read instructions, pamphlets, or written material from your doctor or pharmacy?: Never  Health literacy needs impacting ability to self-manage diabetes?: No      Diabetes Self-Management Skills Assessment    Diabetes Disease Process/Treatment Options  Patient/caregiver able to state what happens when someone has diabetes.: no  Patient/caregiver knows what type of diabetes they have.: yes  Diabetes Type : Pre-Diabetes  Patient/caregiver able to identify at least three signs and symptoms of diabetes.: no  Patient able to identify at least three risk factors for diabetes.: no  Diabetes Disease Process/Treatment Options: Skills Assessment Completed: Yes  Assessment indicates:: Instruction Needed  Area of need?: Yes     Reviewed diabetes disease process and treatment options    Nutrition/Healthy Eating  Method of carbohydrate measurement:: carb counting/reading labels  Patient can identify foods that impact blood sugar.: yes  Patient-identified foods:: starches (bread, pasta, rice, cereal), sweets, soda  Nutrition/Healthy Eating Skills Assessment Completed:: Yes  Assessment indicates:: Instruction Needed  Area of need?: Yes     Reviewed CHO counting, label reading and addt'l resources to assist w/ CHO counting; plate method reviewed; alternatives to sugary beverages discussed    Physical Activity/Exercise  Patient's daily activity level:: moderately active  Patient formally exercises outside of work.: yes  Exercise Type: other (see comments) (Gym)  Frequency: three times a week  Duration: 1 hour  Patient can identify forms of physical activity.: yes  Stated forms of physical activity:: any movement performed by muscles that uses energy  Patient can identify reasons why exercise/physical activity is important in diabetes management.: no  Physical Activity/Exercise Skills Assessment  Completed: : Yes  Assessment indicates:: Instruction Needed  Area of need?: Yes     Reviewed goals and benefits    Medications  Medication Skills Assessment Completed:: No  Deffered due to:: Other (comment) (Not on DM meds)  Area of need?: No    Home Blood Glucose Monitoring  Patient states that blood sugar is checked at home daily.: no  Reasons for not monitoring:: other (see comments) (Prediabetes)  Home Blood Glucose Monitoring Skills Assessment Completed: : Yes  Assessment indicates:: Other (comment) (Will not SMBG at this time)  Area of need?: No    Acute Complications  Acute Complications Skills Assessment Completed: : No  Deffered due to:: Other (comment) (Prediabetes)    Chronic Complications  Chronic Complications Skills Assessment Completed: : No  Deferred due to:: Other (comment) (Prediabetes)    Psychosocial/Coping  Patient can identify ways of coping with chronic disease.: yes  Patient-stated ways of coping with chronic disease:: support from loved ones  Psychosocial/Coping Skills Assessment Completed: : Yes  Assessment indicates:: Adequate understanding  Area of need?: No    Assessment Summary and Plan    Based on today's diabetes care assessment, the following areas of need were identified:      Social 8/24/2022   Support No   Access to Mass Media/Tech No   Cognitive/Behavioral Health No   Culture/Pentecostal No   Communication No   Health Literacy No              Diabetes Self-Management Skills 8/24/2022   Diabetes Disease Process/Treatment Options Yes   Nutrition/Healthy Eating Yes   Physical Activity/Exercise Yes   Medication No   Home Blood Glucose Monitoring No   Psychosocial/Coping No          Today's interventions were provided through individual discussion, instruction, and written materials were provided.      Patient verbalized understanding of instruction and written materials.  Pt was able to return back demonstration of instructions today. Patient understood key points, needs reinforcement  and further instruction.     Diabetes Self-Management Care Plan:    Today's Diabetes Self-Management Care Plan was developed with Isela's input. Isela has agreed to work toward the following goal(s) to improve his/her overall diabetes control.      Care Plan: Diabetes Management   Updates made since 7/25/2022 12:00 AM      Problem: Physical Activity and Exercise       Long-Range Goal: Patient to aim for 150 min/wk of PA    Start Date: 8/24/2022   Expected End Date: 3/24/2023   Priority: High   Barriers: No Barriers Identified      Task: Discussed role of physical activity on reducing insulin resistance and improvement in overall glycemic control. Completed 8/24/2022                Follow Up Plan     Follow up in about 6 months (around 2/24/2023).    Today's care plan and follow up schedule was discussed with patient.  Isela verbalized understanding of the care plan, goals, and agrees to follow up plan.        The patient was encouraged to communicate with his/her health care provider/physician and care team regarding his/her condition(s) and treatment.  I provided the patient with my contact information today and encouraged to contact me via phone or [facility] Portal as needed.     Length of Visit   Total Time: 60 Minutes

## 2022-08-31 ENCOUNTER — TELEPHONE (OUTPATIENT)
Dept: HEPATOLOGY | Facility: CLINIC | Age: 52
End: 2022-08-31
Payer: COMMERCIAL

## 2022-08-31 NOTE — TELEPHONE ENCOUNTER
Called patient. No answer. Left message to notify her about the messages that was sent to her by her provider. Instructed her to reply the message or call us at .    Gabriela Green, [facility] Staff  Please notify pt to read unread [facility] message and/or ask if she has decided on MRI versus liver biopsy

## 2022-09-08 LAB
FINAL PATHOLOGIC DIAGNOSIS: NORMAL
GROSS: NORMAL
Lab: NORMAL
MICROSCOPIC EXAM: NORMAL

## 2022-09-09 ENCOUNTER — PATIENT MESSAGE (OUTPATIENT)
Dept: DERMATOLOGY | Facility: CLINIC | Age: 52
End: 2022-09-09
Payer: COMMERCIAL

## 2022-09-09 DIAGNOSIS — D09.9 SQUAMOUS CELL CARCINOMA IN SITU (SCCIS): Primary | ICD-10-CM

## 2022-09-09 RX ORDER — FLUOROURACIL 50 MG/G
CREAM TOPICAL
Qty: 40 G | Refills: 1 | Status: SHIPPED | OUTPATIENT
Start: 2022-09-09 | End: 2022-11-12

## 2022-09-23 ENCOUNTER — TELEPHONE (OUTPATIENT)
Dept: DERMATOLOGY | Facility: CLINIC | Age: 52
End: 2022-09-23
Payer: COMMERCIAL

## 2022-09-23 NOTE — TELEPHONE ENCOUNTER
I called pt today and left a voicemail letting her know that I sent her biopsy results through the portal and it appears she hasn't viewed them yet. My staff also called her and left a voicemail. I asked her to give us a call back when she gets this or write us back and let us know she received the biopsy results. If we don't hear from her next week, we will send a certified letter in the mail.

## 2022-10-05 ENCOUNTER — PATIENT MESSAGE (OUTPATIENT)
Dept: DERMATOLOGY | Facility: CLINIC | Age: 52
End: 2022-10-05
Payer: COMMERCIAL

## 2022-10-17 ENCOUNTER — TELEPHONE (OUTPATIENT)
Dept: FAMILY MEDICINE | Facility: CLINIC | Age: 52
End: 2022-10-17
Payer: COMMERCIAL

## 2022-10-17 NOTE — TELEPHONE ENCOUNTER
----- Message from Brianna Black sent at 10/17/2022  3:45 PM CDT -----  Regarding: advice/same day  Contact: [phone]  Type:  Same Day Appointment Request    Advice   Name of Caller: self   When is the first available appointment?   Symptoms: burning in her chest, wheezing, this is the time of year she gets Bronchitis. Started this weekend. She usually gets a shot.   Best Call Back Number:   Additional Information:   [id number] [address];

## 2022-10-17 NOTE — TELEPHONE ENCOUNTER
----- Message from Brianna Black sent at 10/17/2022  3:45 PM CDT -----  Regarding: advice/same day  Contact: [phone]  Type:  Same Day Appointment Request    Advice   Name of Caller: self   When is the first available appointment?   Symptoms: burning in her chest, wheezing, this is the time of year she gets Bronchitis. Started this weekend. She usually gets a shot.   Best Call Back Number:   Additional Information:   [id number] [place];

## 2022-10-17 NOTE — TELEPHONE ENCOUNTER
Spoke with pt. She has begun coughing and wheezing over the last few days. She's concerned about bronchitis. Appt has been scheduled for pt to be evaluated by Dr. Wyman.

## 2022-10-18 ENCOUNTER — LAB VISIT (OUTPATIENT)
Dept: LAB | Facility: HOSPITAL | Age: 52
End: 2022-10-18
Attending: STUDENT IN AN ORGANIZED HEALTH CARE EDUCATION/TRAINING PROGRAM
Payer: COMMERCIAL

## 2022-10-18 ENCOUNTER — OFFICE VISIT (OUTPATIENT)
Dept: FAMILY MEDICINE | Facility: CLINIC | Age: 52
End: 2022-10-18
Payer: COMMERCIAL

## 2022-10-18 VITALS
TEMPERATURE: 98 F | SYSTOLIC BLOOD PRESSURE: 122 MMHG | HEART RATE: 78 BPM | WEIGHT: 185.5 LBS | BODY MASS INDEX: 35.02 KG/M2 | OXYGEN SATURATION: 97 % | HEIGHT: 61 IN | DIASTOLIC BLOOD PRESSURE: 84 MMHG

## 2022-10-18 DIAGNOSIS — K76.0 FATTY LIVER: ICD-10-CM

## 2022-10-18 DIAGNOSIS — N30.00 ACUTE CYSTITIS WITHOUT HEMATURIA: Primary | ICD-10-CM

## 2022-10-18 DIAGNOSIS — J40 BRONCHITIS: ICD-10-CM

## 2022-10-18 DIAGNOSIS — R30.0 DYSURIA: ICD-10-CM

## 2022-10-18 DIAGNOSIS — R73.03 PRE-DIABETES: ICD-10-CM

## 2022-10-18 DIAGNOSIS — N76.0 ACUTE VAGINITIS: ICD-10-CM

## 2022-10-18 DIAGNOSIS — E78.5 HYPERLIPIDEMIA, UNSPECIFIED HYPERLIPIDEMIA TYPE: ICD-10-CM

## 2022-10-18 DIAGNOSIS — R79.89 ELEVATED LFTS: ICD-10-CM

## 2022-10-18 LAB
ALBUMIN SERPL BCP-MCNC: 4.8 G/DL (ref 3.5–5.2)
ALP SERPL-CCNC: 110 U/L (ref 38–126)
ALT SERPL W/O P-5'-P-CCNC: 43 U/L (ref 10–44)
ANION GAP SERPL CALC-SCNC: 8 MMOL/L (ref 8–16)
AST SERPL-CCNC: 33 U/L (ref 15–46)
BILIRUB SERPL-MCNC: 0.1 MG/DL (ref 0.1–1)
BILIRUB SERPL-MCNC: ABNORMAL MG/DL
BLOOD URINE, POC: ABNORMAL
CALCIUM SERPL-MCNC: 9.5 MG/DL (ref 8.7–10.5)
CHLORIDE SERPL-SCNC: 103 MMOL/L (ref 95–110)
CHOLEST SERPL-MCNC: 194 MG/DL (ref 120–199)
CHOLEST/HDLC SERPL: 3.3 {RATIO} (ref 2–5)
CLARITY, POC UA: CLEAR
CO2 SERPL-SCNC: 32 MMOL/L (ref 23–29)
COLOR, POC UA: YELLOW
CREAT SERPL-MCNC: 0.67 MG/DL (ref 0.5–1.4)
EST. GFR  (NO RACE VARIABLE): >60 ML/MIN/1.73 M^2
ESTIMATED AVG GLUCOSE: 114 MG/DL (ref 68–131)
GLUCOSE SERPL-MCNC: 117 MG/DL (ref 70–110)
GLUCOSE UR QL STRIP: ABNORMAL
HBA1C MFR BLD: 5.6 % (ref 4–5.6)
HDLC SERPL-MCNC: 59 MG/DL (ref 40–75)
HDLC SERPL: 30.4 % (ref 20–50)
KETONES UR QL STRIP: ABNORMAL
LDLC SERPL CALC-MCNC: 106.6 MG/DL (ref 63–159)
LEUKOCYTE ESTERASE URINE, POC: 1
NITRITE, POC UA: ABNORMAL
NONHDLC SERPL-MCNC: 135 MG/DL
PH, POC UA: 6
POTASSIUM SERPL-SCNC: 4.4 MMOL/L (ref 3.5–5.1)
PROT SERPL-MCNC: 7.9 G/DL (ref 6–8.4)
PROTEIN, POC: ABNORMAL
SODIUM SERPL-SCNC: 143 MMOL/L (ref 136–145)
SPECIFIC GRAVITY, POC UA: 1.02
TRIGL SERPL-MCNC: 142 MG/DL (ref 30–150)
UROBILINOGEN, POC UA: ABNORMAL
UUN UR-MCNC: 13 MG/DL (ref 7–17)

## 2022-10-18 PROCEDURE — 80053 COMPREHEN METABOLIC PANEL: CPT | Mod: PO | Performed by: FAMILY MEDICINE

## 2022-10-18 PROCEDURE — 3074F PR MOST RECENT SYSTOLIC BLOOD PRESSURE < 130 MM HG: ICD-10-PCS | Mod: CPTII,S$GLB,, | Performed by: STUDENT IN AN ORGANIZED HEALTH CARE EDUCATION/TRAINING PROGRAM

## 2022-10-18 PROCEDURE — 99214 OFFICE O/P EST MOD 30 MIN: CPT | Mod: 25,S$GLB,, | Performed by: STUDENT IN AN ORGANIZED HEALTH CARE EDUCATION/TRAINING PROGRAM

## 2022-10-18 PROCEDURE — 81002 POCT URINE DIPSTICK WITHOUT MICROSCOPE: ICD-10-PCS | Mod: S$GLB,,, | Performed by: STUDENT IN AN ORGANIZED HEALTH CARE EDUCATION/TRAINING PROGRAM

## 2022-10-18 PROCEDURE — 99214 PR OFFICE/OUTPT VISIT, EST, LEVL IV, 30-39 MIN: ICD-10-PCS | Mod: 25,S$GLB,, | Performed by: STUDENT IN AN ORGANIZED HEALTH CARE EDUCATION/TRAINING PROGRAM

## 2022-10-18 PROCEDURE — 81002 URINALYSIS NONAUTO W/O SCOPE: CPT | Mod: S$GLB,,, | Performed by: STUDENT IN AN ORGANIZED HEALTH CARE EDUCATION/TRAINING PROGRAM

## 2022-10-18 PROCEDURE — 3074F SYST BP LT 130 MM HG: CPT | Mod: CPTII,S$GLB,, | Performed by: STUDENT IN AN ORGANIZED HEALTH CARE EDUCATION/TRAINING PROGRAM

## 2022-10-18 PROCEDURE — 1159F MED LIST DOCD IN RCRD: CPT | Mod: CPTII,S$GLB,, | Performed by: STUDENT IN AN ORGANIZED HEALTH CARE EDUCATION/TRAINING PROGRAM

## 2022-10-18 PROCEDURE — 80061 LIPID PANEL: CPT | Performed by: FAMILY MEDICINE

## 2022-10-18 PROCEDURE — 3044F HG A1C LEVEL LT 7.0%: CPT | Mod: CPTII,S$GLB,, | Performed by: STUDENT IN AN ORGANIZED HEALTH CARE EDUCATION/TRAINING PROGRAM

## 2022-10-18 PROCEDURE — 36415 COLL VENOUS BLD VENIPUNCTURE: CPT | Mod: PO | Performed by: FAMILY MEDICINE

## 2022-10-18 PROCEDURE — 1160F PR REVIEW ALL MEDS BY PRESCRIBER/CLIN PHARMACIST DOCUMENTED: ICD-10-PCS | Mod: CPTII,S$GLB,, | Performed by: STUDENT IN AN ORGANIZED HEALTH CARE EDUCATION/TRAINING PROGRAM

## 2022-10-18 PROCEDURE — 83036 HEMOGLOBIN GLYCOSYLATED A1C: CPT | Performed by: FAMILY MEDICINE

## 2022-10-18 PROCEDURE — 1160F RVW MEDS BY RX/DR IN RCRD: CPT | Mod: CPTII,S$GLB,, | Performed by: STUDENT IN AN ORGANIZED HEALTH CARE EDUCATION/TRAINING PROGRAM

## 2022-10-18 PROCEDURE — 3079F DIAST BP 80-89 MM HG: CPT | Mod: CPTII,S$GLB,, | Performed by: STUDENT IN AN ORGANIZED HEALTH CARE EDUCATION/TRAINING PROGRAM

## 2022-10-18 PROCEDURE — 96372 THER/PROPH/DIAG INJ SC/IM: CPT | Mod: S$GLB,,, | Performed by: STUDENT IN AN ORGANIZED HEALTH CARE EDUCATION/TRAINING PROGRAM

## 2022-10-18 PROCEDURE — 96372 PR INJECTION,THERAP/PROPH/DIAG2ST, IM OR SUBCUT: ICD-10-PCS | Mod: S$GLB,,, | Performed by: STUDENT IN AN ORGANIZED HEALTH CARE EDUCATION/TRAINING PROGRAM

## 2022-10-18 PROCEDURE — 1159F PR MEDICATION LIST DOCUMENTED IN MEDICAL RECORD: ICD-10-PCS | Mod: CPTII,S$GLB,, | Performed by: STUDENT IN AN ORGANIZED HEALTH CARE EDUCATION/TRAINING PROGRAM

## 2022-10-18 PROCEDURE — 3079F PR MOST RECENT DIASTOLIC BLOOD PRESSURE 80-89 MM HG: ICD-10-PCS | Mod: CPTII,S$GLB,, | Performed by: STUDENT IN AN ORGANIZED HEALTH CARE EDUCATION/TRAINING PROGRAM

## 2022-10-18 PROCEDURE — 3044F PR MOST RECENT HEMOGLOBIN A1C LEVEL <7.0%: ICD-10-PCS | Mod: CPTII,S$GLB,, | Performed by: STUDENT IN AN ORGANIZED HEALTH CARE EDUCATION/TRAINING PROGRAM

## 2022-10-18 RX ORDER — CEFDINIR 300 MG/1
300 CAPSULE ORAL 2 TIMES DAILY
Qty: 10 CAPSULE | Refills: 0 | Status: SHIPPED | OUTPATIENT
Start: 2022-10-18 | End: 2022-10-25

## 2022-10-18 RX ORDER — FLUCONAZOLE 150 MG/1
150 TABLET ORAL DAILY
Qty: 1 TABLET | Refills: 0 | Status: SHIPPED | OUTPATIENT
Start: 2022-10-18 | End: 2022-10-19

## 2022-10-18 RX ORDER — TRIAMCINOLONE ACETONIDE 40 MG/ML
80 INJECTION, SUSPENSION INTRA-ARTICULAR; INTRAMUSCULAR ONCE
Status: COMPLETED | OUTPATIENT
Start: 2022-10-18 | End: 2022-10-18

## 2022-10-18 RX ADMIN — TRIAMCINOLONE ACETONIDE 80 MG: 40 INJECTION, SUSPENSION INTRA-ARTICULAR; INTRAMUSCULAR at 11:10

## 2022-10-18 NOTE — PROGRESS NOTES
Patient ID: Isela Graf is a 52 y.o. female.     Chief Complaint: Cough    Cough  This is a new problem. The current episode started in the past 7 days. The problem has been unchanged. The problem occurs every few minutes. The cough is Non-productive. Associated symptoms include nasal congestion, postnasal drip and rhinorrhea. Pertinent negatives include no chest pain, chills, ear pain, fever, headaches, myalgias, rash, sore throat, shortness of breath or weight loss. Nothing aggravates the symptoms. She has tried a beta-agonist inhaler (flonase) for the symptoms. The treatment provided no relief. Her past medical history is significant for environmental allergies.   Dysuria   This is a new problem. The current episode started yesterday. The problem occurs every urination. The problem has been unchanged. The quality of the pain is described as burning. The pain is mild. There has been no fever. Associated symptoms include urgency. Pertinent negatives include no chills, hesitancy, nausea, possible pregnancy, vomiting, weight loss, rash or withholding. She has tried nothing for the symptoms.        Review of Systems  Review of Systems   Constitutional:  Negative for chills, fever and weight loss.   HENT:  Positive for postnasal drip and rhinorrhea. Negative for ear pain, sinus pain and sore throat.    Eyes:  Negative for discharge.   Respiratory:  Positive for cough. Negative for shortness of breath.    Cardiovascular:  Negative for chest pain and leg swelling.   Gastrointestinal:  Negative for diarrhea, nausea and vomiting.   Genitourinary:  Positive for dysuria and urgency. Negative for hesitancy.   Musculoskeletal:  Negative for myalgias.   Skin:  Negative for rash.   Neurological:  Negative for weakness and headaches.   Endo/Heme/Allergies:  Positive for environmental allergies.   Psychiatric/Behavioral:  Negative for depression.    All other systems reviewed and are negative.    Currently Medications  Current  Outpatient Medications on File Prior to Visit   Medication Sig Dispense Refill    albuterol (PROVENTIL/VENTOLIN HFA) 90 mcg/actuation inhaler USE 2 INHALATIONS EVERY 6 HOURS AS NEEDED FOR WHEEZING 17 g 0    ALPRAZolam (XANAX) 0.5 MG tablet Take 1 tablet (0.5 mg total) by mouth 2 (two) times daily as needed for Anxiety. 60 tablet 5    calcium carbonate (OS-JUNIOR) 600 mg calcium (1,500 mg) Tab Take 600 mg by mouth once daily.      citalopram (CELEXA) 10 MG tablet Take 1 tablet (10 mg total) by mouth every evening. 90 tablet 3    dulaglutide (TRULICITY) 0.75 mg/0.5 mL pen injector Inject 0.75 mg into the skin once a week. For DM 4 pen 11    esomeprazole (NEXIUM) 20 MG capsule Take 1 capsule (20 mg total) by mouth before breakfast. 90 capsule 3    esomeprazole (NEXIUM) 20 MG capsule Take 1 capsule (20 mg total) by mouth before breakfast. 90 capsule 3    fexofenadine (ALLEGRA) 180 MG tablet Take 180 mg by mouth as needed.       fish oil-omega-3 fatty acids 300-1,000 mg capsule Take 2 g by mouth after lunch.       fluorouraciL (EFUDEX) 5 % cream AAA on upper chest BID x 4-6 weeks. Stop if blistered, oozing, or bleeding. Use daily sun protection. 40 g 1    fluticasone propionate (FLONASE) 50 mcg/actuation nasal spray 2 sprays (100 mcg total) by Each Nostril route daily as needed. 48 g 3    ketoconazole (NIZORAL) 2 % shampoo Wash hair with medicated shampoo at least 2x/week - let sit on scalp at least 5 minutes prior to rinsing 120 mL 5    MULTIVIT-IRON-MIN-FOLIC ACID 3,500-18-0.4 UNIT-MG-MG ORAL CHEW Take 1 tablet by mouth after lunch.       mv,junior,iron,mn/folic acid/chol (BODY, HAIR, SKIN AND NAILS ORAL) Take by mouth.      naproxen (NAPROSYN) 500 MG tablet Take 1 tablet (500 mg total) by mouth 2 (two) times daily with meals. 30 tablet 0    psyllium (METAMUCIL) powder Take 1 packet by mouth once daily.      rosuvastatin (CRESTOR) 20 MG tablet Take 1 tablet (20 mg total) by mouth every evening. 90 tablet 3    triamcinolone  "acetonide 0.025% (KENALOG) 0.025 % cream Apply topically 2 (two) times daily. To affected areas on face x 1-2 wks then prn flares only 30 g 2     No current facility-administered medications on file prior to visit.       Physical  Exam  Vitals:    10/18/22 1119   BP: 122/84   BP Location: Left arm   Patient Position: Sitting   Pulse: 78   Temp: 97.6 °F (36.4 °C)   SpO2: 97%   Weight: 84.1 kg (185 lb 8.3 oz)   Height: 5' 1" (1.549 m)      Body mass index is 35.05 kg/m².    Physical Exam  Vitals and nursing note reviewed.   Constitutional:       General: She is not in acute distress.     Appearance: She is not ill-appearing.   HENT:      Head: Normocephalic and atraumatic.      Right Ear: External ear normal.      Left Ear: External ear normal.      Nose: Nose normal.      Mouth/Throat:      Mouth: Mucous membranes are moist.   Eyes:      Extraocular Movements: Extraocular movements intact.      Conjunctiva/sclera: Conjunctivae normal.   Cardiovascular:      Rate and Rhythm: Normal rate and regular rhythm.      Pulses: Normal pulses.      Heart sounds: No murmur heard.  Pulmonary:      Effort: Pulmonary effort is normal. No respiratory distress.      Breath sounds: No wheezing.   Abdominal:      General: There is no distension.      Palpations: Abdomen is soft. There is no mass.      Tenderness: There is no abdominal tenderness.   Musculoskeletal:         General: No swelling.      Cervical back: Normal range of motion.   Skin:     Coloration: Skin is not jaundiced.      Findings: No rash.   Neurological:      General: No focal deficit present.      Mental Status: She is alert and oriented to person, place, and time.   Psychiatric:         Mood and Affect: Mood normal.         Thought Content: Thought content normal.       Labs:    Complete Blood Count  Lab Results   Component Value Date    RBC 4.55 07/25/2022    HGB 13.1 07/25/2022    HCT 41.0 07/25/2022    MCV 90 07/25/2022    MCH 28.8 07/25/2022    MCHC 32.0 " 07/25/2022    RDW 13.7 07/25/2022     07/25/2022    MPV 10.0 07/25/2022    GRAN 6.2 07/12/2022    GRAN 58.0 07/12/2022    LYMPH 3.5 07/12/2022    LYMPH 33.2 07/12/2022    MONO 0.6 07/12/2022    MONO 5.9 07/12/2022    EOS 0.2 07/12/2022    BASO 0.07 07/12/2022    EOSINOPHIL 1.8 07/12/2022    BASOPHIL 0.7 07/12/2022    DIFFMETHOD Automated 07/12/2022       Comprehensive Metabolic Panel  Lab Results   Component Value Date     (H) 10/18/2022    BUN 13 10/18/2022    CREATININE 0.67 10/18/2022     10/18/2022    K 4.4 10/18/2022     10/18/2022    PROT 7.9 10/18/2022    ALBUMIN 4.8 10/18/2022    BILITOT 0.1 10/18/2022    AST 33 10/18/2022    ALKPHOS 110 10/18/2022    CO2 32 (H) 10/18/2022    ALT 43 10/18/2022    ANIONGAP 8 10/18/2022    EGFRNONAA >60.0 07/25/2022    ESTGFRAFRICA >60.0 07/25/2022       TSH  Lab Results   Component Value Date    TSH 2.690 07/12/2022       Imaging:  US Abdomen Complete  Narrative: EXAMINATION:  US ABDOMEN COMPLETE    CLINICAL HISTORY:  Fatty (change of) liver, not elsewhere classifiedeval for liver disease, portal HTN, include spleen assessment;    TECHNIQUE:  Multiple static ultrasound images are submitted for interpretation with color flow and spectral Doppler imaging.    COMPARISON:  12/20/2017    FINDINGS:  The liver is enlarged.  It measures 18.8 cm in length.  The liver has moderately increased echogenicity and coarsening of its echotexture.  There is a 31 mm oval shaped area of relative hypoechogenicity in the right lobe of the liver.  There is no intrahepatic biliary ductal dilatation. The common bile duct has a diameter of 9 mm. The gallbladder is absent.  The visualized portion of the pancreas is normal in appearance. The spleen is normal in appearance. It measures 10.2 cm in length. The kidneys are normal in appearance. The right kidney measures 12.1 cm in length. The left kidney measures 11.8 cm in length. The abdominal aorta and inferior vena cava are  normal in appearance. The main portal vein has a normal venous waveform with flow directed towards the liver. It has a velocity of 32 cm/sec.  Impression: 1. Hepatomegaly most likely secondary to hepatic steatosis.  The liver measures 18.8 cm in length.  2. There is a 31 mm oval shaped area of relative hypoechogenicity in the right lobe of the liver.  This is characteristic of focal fatty sparing.  3. The gallbladder is absent.    Electronically signed by: Drake Ramírez MD  Date:    07/27/2022  Time:    12:03      Assessment/Plan:    Problem List Items Addressed This Visit    None  Visit Diagnoses       Acute cystitis without hematuria    -  Primary    Relevant Medications    cefdinir (OMNICEF) 300 MG capsule    Dysuria        Relevant Orders    POCT URINE DIPSTICK WITHOUT MICROSCOPE    Acute vaginitis        Relevant Medications    fluconazole (DIFLUCAN) 150 MG Tab    Bronchitis        Relevant Medications    triamcinolone acetonide injection 80 mg (Completed)             Discussed how to stay healthy including: diet, exercise, refraining from smoking and discussed screening exams / tests needed for age, sex and family Hx.      Mario Wyman MD         5-Fu Counseling: 5-Fluorouracil Counseling:  I discussed with the patient the risks of 5-fluorouracil including but not limited to erythema, scaling, itching, weeping, crusting, and pain.

## 2022-10-25 ENCOUNTER — OFFICE VISIT (OUTPATIENT)
Dept: FAMILY MEDICINE | Facility: CLINIC | Age: 52
End: 2022-10-25
Payer: COMMERCIAL

## 2022-10-25 VITALS
HEART RATE: 91 BPM | SYSTOLIC BLOOD PRESSURE: 126 MMHG | OXYGEN SATURATION: 96 % | DIASTOLIC BLOOD PRESSURE: 78 MMHG | HEIGHT: 62 IN | BODY MASS INDEX: 33.87 KG/M2 | TEMPERATURE: 98 F | WEIGHT: 184.06 LBS

## 2022-10-25 DIAGNOSIS — R73.03 PRE-DIABETES: ICD-10-CM

## 2022-10-25 DIAGNOSIS — E66.9 OBESITY (BMI 30-39.9): ICD-10-CM

## 2022-10-25 DIAGNOSIS — Z23 FLU VACCINE NEED: Primary | ICD-10-CM

## 2022-10-25 DIAGNOSIS — K76.0 FATTY LIVER: ICD-10-CM

## 2022-10-25 PROCEDURE — 99213 OFFICE O/P EST LOW 20 MIN: CPT | Mod: 25,S$GLB,, | Performed by: FAMILY MEDICINE

## 2022-10-25 PROCEDURE — 99213 PR OFFICE/OUTPT VISIT, EST, LEVL III, 20-29 MIN: ICD-10-PCS | Mod: 25,S$GLB,, | Performed by: FAMILY MEDICINE

## 2022-10-25 PROCEDURE — 3074F PR MOST RECENT SYSTOLIC BLOOD PRESSURE < 130 MM HG: ICD-10-PCS | Mod: CPTII,S$GLB,, | Performed by: FAMILY MEDICINE

## 2022-10-25 PROCEDURE — 3044F HG A1C LEVEL LT 7.0%: CPT | Mod: CPTII,S$GLB,, | Performed by: FAMILY MEDICINE

## 2022-10-25 PROCEDURE — 90471 FLU VACCINE (QUAD) GREATER THAN OR EQUAL TO 3YO PRESERVATIVE FREE IM: ICD-10-PCS | Mod: S$GLB,,, | Performed by: FAMILY MEDICINE

## 2022-10-25 PROCEDURE — 3008F BODY MASS INDEX DOCD: CPT | Mod: CPTII,S$GLB,, | Performed by: FAMILY MEDICINE

## 2022-10-25 PROCEDURE — 3074F SYST BP LT 130 MM HG: CPT | Mod: CPTII,S$GLB,, | Performed by: FAMILY MEDICINE

## 2022-10-25 PROCEDURE — 3008F PR BODY MASS INDEX (BMI) DOCUMENTED: ICD-10-PCS | Mod: CPTII,S$GLB,, | Performed by: FAMILY MEDICINE

## 2022-10-25 PROCEDURE — 90471 IMMUNIZATION ADMIN: CPT | Mod: S$GLB,,, | Performed by: FAMILY MEDICINE

## 2022-10-25 PROCEDURE — 3044F PR MOST RECENT HEMOGLOBIN A1C LEVEL <7.0%: ICD-10-PCS | Mod: CPTII,S$GLB,, | Performed by: FAMILY MEDICINE

## 2022-10-25 PROCEDURE — 3078F DIAST BP <80 MM HG: CPT | Mod: CPTII,S$GLB,, | Performed by: FAMILY MEDICINE

## 2022-10-25 PROCEDURE — 90686 IIV4 VACC NO PRSV 0.5 ML IM: CPT | Mod: S$GLB,,, | Performed by: FAMILY MEDICINE

## 2022-10-25 PROCEDURE — 90686 FLU VACCINE (QUAD) GREATER THAN OR EQUAL TO 3YO PRESERVATIVE FREE IM: ICD-10-PCS | Mod: S$GLB,,, | Performed by: FAMILY MEDICINE

## 2022-10-25 PROCEDURE — 3078F PR MOST RECENT DIASTOLIC BLOOD PRESSURE < 80 MM HG: ICD-10-PCS | Mod: CPTII,S$GLB,, | Performed by: FAMILY MEDICINE

## 2022-10-25 NOTE — PROGRESS NOTES
"Subjective:      Patient ID: Isela Graf is a 52 y.o. female.    Chief Complaint: Follow-up      Vitals:    10/25/22 1551   BP: 126/78   Pulse: 91   Temp: 97.9 °F (36.6 °C)   TempSrc: Oral   SpO2: 96%   Weight: 83.5 kg (184 lb 1.4 oz)   Height: 5' 1.5" (1.562 m)        HPI   Here with ; [facility] helps with her personality and stress   will call his [facility] to get her counsiling, CBT  If needs a referral, he'll let me know  Drinking lot less alcohol  Lost weight 13 ounds in 3 months      Problem List  Patient Active Problem List   Diagnosis    [id number] melanoma of scalp    RUQ pain    Extrinsic asthma without complication    Dyspepsia    Irritable bowel syndrome with constipation    Hyperlipidemia    Pre-diabetes    Fatty liver    Elevated liver enzymes    Obesity (BMI 30-39.9)    Alpha-1-antitrypsin deficiency    Hepatic fibrosis, stage 3        ALLERGIES:   Review of patient's allergies indicates:   Allergen Reactions    Sulfa (sulfonamide antibiotics) Other (See Comments)     Stomach pain       MEDS:   Current Outpatient Medications:     albuterol (PROVENTIL/VENTOLIN HFA) 90 mcg/actuation inhaler, USE 2 INHALATIONS EVERY 6 HOURS AS NEEDED FOR WHEEZING, Disp: 17 g, Rfl: 0    calcium carbonate (OS-ADAMA) 600 mg calcium (1,500 mg) Tab, Take 600 mg by mouth once daily., Disp: , Rfl:     fexofenadine (ALLEGRA) 180 MG tablet, Take 180 mg by mouth as needed. , Disp: , Rfl:     fish oil-omega-3 fatty acids 300-1,000 mg capsule, Take 2 g by mouth after lunch. , Disp: , Rfl:     ketoconazole (NIZORAL) 2 % shampoo, Wash hair with medicated shampoo at least 2x/week - let sit on scalp at least 5 minutes prior to rinsing, Disp: 120 mL, Rfl: 5    MULTIVIT-IRON-MIN-FOLIC ACID 3,500-18-0.4 UNIT-MG-MG ORAL CHEW, Take 1 tablet by mouth after lunch. , Disp: , Rfl:     mv,adama,iron,mn/folic acid/chol (BODY, HAIR, SKIN AND NAILS ORAL), Take by mouth., Disp: , Rfl:     psyllium (METAMUCIL) powder, Take 1 packet by mouth once " daily., Disp: , Rfl:     citalopram (CELEXA) 10 MG tablet, Take 1 tablet (10 mg total) by mouth every evening., Disp: 30 tablet, Rfl: 3    esomeprazole (NEXIUM) 20 MG capsule, Take 1 capsule (20 mg total) by mouth before breakfast., Disp: 30 capsule, Rfl: 3    fluticasone propionate (FLONASE) 50 mcg/actuation nasal spray, 2 sprays (100 mcg total) by Each Nostril route daily as needed., Disp: 48 g, Rfl: 3    rosuvastatin (CRESTOR) 20 MG tablet, Take 1 tablet (20 mg total) by mouth every evening., Disp: 30 tablet, Rfl: 3    tiZANidine (ZANAFLEX) 4 MG tablet, Take 1 tablet (4 mg total) by mouth every evening., Disp: 30 tablet, Rfl: 1    triamcinolone acetonide 0.025% (KENALOG) 0.025 % cream, Apply topically 2 (two) times daily. To affected areas on face x 1-2 wks then prn flares only, Disp: 30 g, Rfl: 2      History:  Current Providers as of 10/25/2022  PCP: Carlos Santiago MD  Care Team Provider: Orville Bird MD  Care Team Provider: Stephanie Hughes LPN  Care Team Provider: Quan Morton MD  Care Team Provider: Philip Ochoa MD  Care Team Provider: Gabriela Green NP  Encounter Provider: Carlos Santiago MD, starting on Tue Oct 25, 2022 12:00 AM  Referring Provider: not found, starting on Tue Oct 25, 2022 12:00 AM  Consulting Physician: Carlos Santiago MD, starting on Tue Oct 25, 2022  3:48 PM (Active)   Past Medical History:   Diagnosis Date    Allergy     Alpha-1-antitrypsin deficiency 7/25/2022    Anxiety     Depression     Hyperlipidemia     Melanoma 1/26/2015    scalp excised by Dr Aldridge    Melanoma of scalp 1/12/2015    Open wound of scalp, complicated 2/6/2015    Pre-diabetes 7/25/2022    Thickened endometrium 06/26/2020    7 mm endometrial stripe.  This would be slightly thickened for a postmenopausal patient.  Further evaluation as clinically indicated.    Uterine fibroid 06/2020    1.1cm Small uterine fibroid identified.     Past Surgical History:   Procedure Laterality Date    APPENDECTOMY       CHOLECYSTECTOMY      COLONOSCOPY N/A 06/02/2020    Procedure: COLONOSCOPY;  Surgeon: Quan Morton MD;  [facility]  Service: Endoscopy;  Laterality: N/A;  5/13 - pt aware if drop off location and visitor policy -   COVID screening scheduled for 6/1/20 at [facility] -     ESOPHAGOGASTRODUODENOSCOPY N/A 06/02/2020    Procedure: EGD (ESOPHAGOGASTRODUODENOSCOPY);  Surgeon: Quan Morton MD;  Location: [facility]  Service: Endoscopy;  Laterality: N/A;    EYE SURGERY      GALLBLADDER SURGERY  02/2017    Scalp Reconstruction      yin-yang flaps    SKIN SURGERY      removal of cancer    TONSILLECTOMY      WLE scalp melanoma  01/26/2015     Social History     Tobacco Use    Smoking status: Never    Smokeless tobacco: Never   Substance Use Topics    Alcohol use: Yes     Alcohol/week: 1.0 standard drink     Types: 1 Glasses of wine per week     Comment: ~1 bottle of wine nightly x years, stopped 7/2022    Drug use: Yes     Types: Marijuana     Comment: medical thc gummy         Review of Systems   Constitutional: Negative.    HENT: Negative.     Respiratory: Negative.     Cardiovascular: Negative.    Gastrointestinal: Negative.    Endocrine: Negative.    Genitourinary: Negative.    Musculoskeletal: Negative.    Psychiatric/Behavioral: Negative.     All other systems reviewed and are negative.  Objective:     Physical Exam  Vitals and nursing note reviewed.   Constitutional:       Appearance: She is well-developed. She is obese.   HENT:      Head: Normocephalic.   Eyes:      Conjunctiva/sclera: Conjunctivae normal.      Pupils: Pupils are equal, round, and reactive to light.   Cardiovascular:      Rate and Rhythm: Normal rate and regular rhythm.      Heart sounds: Normal heart sounds.   Pulmonary:      Effort: Pulmonary effort is normal.      Breath sounds: Normal breath sounds.   Musculoskeletal:         General: Normal range of motion.      Cervical back: Normal range of motion  and neck supple.   Skin:     General: Skin is warm and dry.   Neurological:      Mental Status: She is alert and oriented to person, place, and time.      Deep Tendon Reflexes: Reflexes are normal and symmetric.   Psychiatric:         Behavior: Behavior normal.         Thought Content: Thought content normal.         Judgment: Judgment normal.           Assessment:     1. Flu vaccine need    2. Fatty liver    3. Obesity (BMI 30-39.9)    4. Pre-diabetes      Plan:        Medication List            Accurate as of October 25, 2022 11:59 PM. If you have any questions, ask your nurse or doctor.                CONTINUE taking these medications      albuterol 90 mcg/actuation inhaler  Commonly known as: PROVENTIL/VENTOLIN HFA  USE 2 INHALATIONS EVERY 6 HOURS AS NEEDED FOR WHEEZING     BODY, HAIR, SKIN AND NAILS ORAL     calcium carbonate 600 mg calcium (1,500 mg) Tab  Commonly known as: OS-ADAMA     esomeprazole 20 MG capsule  Commonly known as: NEXIUM  Take 1 capsule (20 mg total) by mouth before breakfast.     fexofenadine 180 MG tablet  Commonly known as: ALLEGRA     fish oil-omega-3 fatty acids 300-1,000 mg capsule     fluorouraciL 5 % cream  Commonly known as: EFUDEX  AAA on upper chest BID x 4-6 weeks. Stop if blistered, oozing, or bleeding. Use daily sun protection.     fluticasone propionate 50 mcg/actuation nasal spray  Commonly known as: FLONASE  2 sprays (100 mcg total) by Each Nostril route daily as needed.     ketoconazole 2 % shampoo  Commonly known as: NIZORAL  Wash hair with medicated shampoo at least 2x/week - let sit on scalp at least 5 minutes prior to rinsing     multivit-iron-min-folic acid 3,500-18-0.4 unit-mg-mg Chew     psyllium powder  Commonly known as: METAMUCIL     rosuvastatin 20 MG tablet  Commonly known as: CRESTOR  Take 1 tablet (20 mg total) by mouth every evening.     triamcinolone acetonide 0.025% 0.025 % cream  Commonly known as: KENALOG  Apply topically 2 (two) times daily. To affected  areas on face x 1-2 wks then prn flares only            STOP taking these medications      ALPRAZolam 0.5 MG tablet  Commonly known as: XANAX  Stopped by: Carlos Santiago MD     cefdinir 300 MG capsule  Commonly known as: OMNICEF  Stopped by: Carlos Santiago MD     citalopram 10 MG tablet  Commonly known as: CeleXA  Stopped by: Carlos Santiago MD     naproxen 500 MG tablet  Commonly known as: NAPROSYN  Stopped by: Carlos Santiago MD            Flu vaccine need  -     Influenza - Quadrivalent *Preferred* (6 months+) (PF)    Fatty liver    Obesity (BMI 30-39.9)    Pre-diabetes

## 2022-11-03 ENCOUNTER — PATIENT MESSAGE (OUTPATIENT)
Dept: OBSTETRICS AND GYNECOLOGY | Facility: CLINIC | Age: 52
End: 2022-11-03
Payer: COMMERCIAL

## 2022-11-04 ENCOUNTER — TELEPHONE (OUTPATIENT)
Dept: OBSTETRICS AND GYNECOLOGY | Facility: CLINIC | Age: 52
End: 2022-11-04
Payer: COMMERCIAL

## 2022-11-04 DIAGNOSIS — Z12.31 SCREENING MAMMOGRAM, ENCOUNTER FOR: Primary | ICD-10-CM

## 2022-11-04 NOTE — TELEPHONE ENCOUNTER
Tried contacting patient to advise no appointments are available for requested date, left voicemail.

## 2022-11-07 ENCOUNTER — PATIENT MESSAGE (OUTPATIENT)
Dept: FAMILY MEDICINE | Facility: CLINIC | Age: 52
End: 2022-11-07
Payer: COMMERCIAL

## 2022-11-07 RX ORDER — CITALOPRAM 10 MG/1
10 TABLET ORAL NIGHTLY
Qty: 30 TABLET | Refills: 3 | Status: SHIPPED | OUTPATIENT
Start: 2022-11-07 | End: 2023-07-19 | Stop reason: SDUPTHER

## 2022-11-07 RX ORDER — ROSUVASTATIN CALCIUM 20 MG/1
20 TABLET, COATED ORAL NIGHTLY
Qty: 30 TABLET | Refills: 3 | Status: SHIPPED | OUTPATIENT
Start: 2022-11-07 | End: 2023-07-19 | Stop reason: SDUPTHER

## 2022-11-07 RX ORDER — HYDROGEN PEROXIDE 3 %
20 SOLUTION, NON-ORAL MISCELLANEOUS
Qty: 30 CAPSULE | Refills: 3 | Status: SHIPPED | OUTPATIENT
Start: 2022-11-07 | End: 2023-07-19 | Stop reason: SDUPTHER

## 2022-11-10 ENCOUNTER — OFFICE VISIT (OUTPATIENT)
Dept: OBSTETRICS AND GYNECOLOGY | Facility: CLINIC | Age: 52
End: 2022-11-10
Attending: OBSTETRICS & GYNECOLOGY
Payer: COMMERCIAL

## 2022-11-10 ENCOUNTER — PATIENT MESSAGE (OUTPATIENT)
Dept: DERMATOLOGY | Facility: CLINIC | Age: 52
End: 2022-11-10
Payer: COMMERCIAL

## 2022-11-10 VITALS
BODY MASS INDEX: 35.05 KG/M2 | HEIGHT: 61 IN | SYSTOLIC BLOOD PRESSURE: 136 MMHG | DIASTOLIC BLOOD PRESSURE: 88 MMHG | WEIGHT: 185.63 LBS

## 2022-11-10 DIAGNOSIS — N95.0 POSTMENOPAUSAL BLEEDING: Primary | ICD-10-CM

## 2022-11-10 PROCEDURE — 3075F SYST BP GE 130 - 139MM HG: CPT | Mod: CPTII,S$GLB,, | Performed by: OBSTETRICS & GYNECOLOGY

## 2022-11-10 PROCEDURE — 3008F BODY MASS INDEX DOCD: CPT | Mod: CPTII,S$GLB,, | Performed by: OBSTETRICS & GYNECOLOGY

## 2022-11-10 PROCEDURE — 3079F DIAST BP 80-89 MM HG: CPT | Mod: CPTII,S$GLB,, | Performed by: OBSTETRICS & GYNECOLOGY

## 2022-11-10 PROCEDURE — 3008F PR BODY MASS INDEX (BMI) DOCUMENTED: ICD-10-PCS | Mod: CPTII,S$GLB,, | Performed by: OBSTETRICS & GYNECOLOGY

## 2022-11-10 PROCEDURE — 1160F PR REVIEW ALL MEDS BY PRESCRIBER/CLIN PHARMACIST DOCUMENTED: ICD-10-PCS | Mod: CPTII,S$GLB,, | Performed by: OBSTETRICS & GYNECOLOGY

## 2022-11-10 PROCEDURE — 99999 PR PBB SHADOW E&M-EST. PATIENT-LVL IV: ICD-10-PCS | Mod: PBBFAC,,, | Performed by: OBSTETRICS & GYNECOLOGY

## 2022-11-10 PROCEDURE — 3079F PR MOST RECENT DIASTOLIC BLOOD PRESSURE 80-89 MM HG: ICD-10-PCS | Mod: CPTII,S$GLB,, | Performed by: OBSTETRICS & GYNECOLOGY

## 2022-11-10 PROCEDURE — 1159F MED LIST DOCD IN RCRD: CPT | Mod: CPTII,S$GLB,, | Performed by: OBSTETRICS & GYNECOLOGY

## 2022-11-10 PROCEDURE — 99213 OFFICE O/P EST LOW 20 MIN: CPT | Mod: S$GLB,,, | Performed by: OBSTETRICS & GYNECOLOGY

## 2022-11-10 PROCEDURE — 1159F PR MEDICATION LIST DOCUMENTED IN MEDICAL RECORD: ICD-10-PCS | Mod: CPTII,S$GLB,, | Performed by: OBSTETRICS & GYNECOLOGY

## 2022-11-10 PROCEDURE — 3075F PR MOST RECENT SYSTOLIC BLOOD PRESS GE 130-139MM HG: ICD-10-PCS | Mod: CPTII,S$GLB,, | Performed by: OBSTETRICS & GYNECOLOGY

## 2022-11-10 PROCEDURE — 1160F RVW MEDS BY RX/DR IN RCRD: CPT | Mod: CPTII,S$GLB,, | Performed by: OBSTETRICS & GYNECOLOGY

## 2022-11-10 PROCEDURE — 3044F HG A1C LEVEL LT 7.0%: CPT | Mod: CPTII,S$GLB,, | Performed by: OBSTETRICS & GYNECOLOGY

## 2022-11-10 PROCEDURE — 99213 PR OFFICE/OUTPT VISIT, EST, LEVL III, 20-29 MIN: ICD-10-PCS | Mod: S$GLB,,, | Performed by: OBSTETRICS & GYNECOLOGY

## 2022-11-10 PROCEDURE — 3044F PR MOST RECENT HEMOGLOBIN A1C LEVEL <7.0%: ICD-10-PCS | Mod: CPTII,S$GLB,, | Performed by: OBSTETRICS & GYNECOLOGY

## 2022-11-10 PROCEDURE — 99999 PR PBB SHADOW E&M-EST. PATIENT-LVL IV: CPT | Mod: PBBFAC,,, | Performed by: OBSTETRICS & GYNECOLOGY

## 2022-11-10 NOTE — PROGRESS NOTES
Chief Complaint   Patient presents with    post menopausal bleeding        HPI:  Isela Graf is a 52 y.o. female patient  who presents today for evaluation of postmenopausal bleeding.  She reports that she has not had any bleeding for several years.  About 2 weeks ago, she describes having light pinkish spotting that lasted for about 7 days in duration. This spotting began the day after having IC.  Denies pelvic pain or fever.  Reports hot flashes / sweats.  Pelvic sono 21 for evaluation of pelvic pain showed an 8 mm ES for which she was evaluated with EMBX.  Patient's last menstrual period was 10/12/2018 (approximate).    21 Pelvic sono:  FINDINGS:  Uterus:  Size: 8.6 x 4.4 x 4.1 cm  Masses: 5 mm nonspecific echogenic focus in the endometrium in the lower uterine body/cervix.  5 mm hypoechoic focus in the posterior uterine fundus suspicious for a small fibroid similar to prior study of 2020.  Endometrium: Upper limits of normal in this post menopausal patient, measuring 8 mm.  Right ovary:  Size: 2.3 x 1.7 x 1.2 cm  Appearance: Normal  Vascular flow: Normal.  Left ovary:  Size: 2.1 x 1.9 x 1.2 cm  Appearance: Normal  Vascular Flow: Normal.  Free Fluid:  Trace free fluid in the cul-de-sac.  Impression:  No acute abnormality identified.  5 mm nonspecific echogenic focus in the endometrium in the lower uterine body/cervix.  Upper limits of normal endometrium measuring 8 mm thickness.  Consider repeat transvaginal ultrasound in 6 months.  5 mm hypoechoic focus in the posterior uterine fundus suspicious for a small fibroid similar to prior study of 2020.    21 EMBX:  SMALL FRAGMENT OF BENIGN ENDOCERVICAL TYPE MUCOSA, NO DYSPLASIA OR   ENDOMETRIAL TISSUE IDENTIFIED.     20 Pap: Negative, HPV: Negative    Past Medical History:   Diagnosis Date    Allergy     Alpha-1-antitrypsin deficiency 2022    Anxiety     Depression     Hyperlipidemia     Melanoma 2015    scalp  excised by Dr Aldridge    Melanoma of scalp 1/12/2015    Open wound of scalp, complicated 2/6/2015    Pre-diabetes 7/25/2022    Thickened endometrium 06/26/2020    7 mm endometrial stripe.  This would be slightly thickened for a postmenopausal patient.  Further evaluation as clinically indicated.    Uterine fibroid 06/2020    1.1cm Small uterine fibroid identified.       Past Surgical History:   Procedure Laterality Date    APPENDECTOMY      CHOLECYSTECTOMY      COLONOSCOPY N/A 6/2/2020    Procedure: COLONOSCOPY;  Surgeon: Quan Morton MD;  Location: [facility] ENDO (4TH FLR);  Service: Endoscopy;  Laterality: N/A;  5/13 - pt aware if drop off location and visitor policy -   COVID screening scheduled for 6/1/20 at [facility] -     ESOPHAGOGASTRODUODENOSCOPY N/A 6/2/2020    Procedure: EGD (ESOPHAGOGASTRODUODENOSCOPY);  Surgeon: Quan Morton MD;  Location: [facility] ENDO (4TH FLR);  Service: Endoscopy;  Laterality: N/A;    EYE SURGERY      GALLBLADDER SURGERY  02/2017    Scalp Reconstruction      yin-yang flaps    SKIN SURGERY      removal of cancer    TONSILLECTOMY      WLE scalp melanoma  1/26/2015         ROS:  GENERAL: Feeling well overall.   SKIN: Denies rash or lesions.   HEAD: Denies head injury or headache.   NODES: Denies enlarged lymph nodes.   CHEST: Denies chest pain or shortness of breath.   CARDIOVASCULAR: Denies palpitations or left sided chest pain.   ABDOMEN: No abdominal pain, nausea, vomiting or rectal bleeding.   URINARY: No dysuria or hematuria.  REPRODUCTIVE: See HPI.   BREASTS: Denies pain, lumps, or nipple discharge.   HEMATOLOGIC: Reports pinkish spotting.  MUSCULOSKELETAL: Denies joint pain or swelling.   NEUROLOGIC: Denies syncope or weakness.   PSYCHIATRIC: Denies depression.    PE:   (chaperone present during entire exam)  APPEARANCE: Well nourished, well developed, in no acute distress.  ABDOMEN: Soft. No tenderness or masses. No hernias. No CVA tenderness.  VULVA: No lesions. Normal  female genitalia.  URETHRAL MEATUS: Normal size and location, no lesions, no prolapse.  URETHRA: No masses, tenderness, prolapse or scarring.  VAGINA: No lesions, no abnormal discharge, no bleeding or old blood seen.  CERVIX: No lesions and discharge. No CMT.  UTERUS: Normal size, regular shape, mobile, non-tender, bladder base nontender.  ADNEXA: No masses, tenderness or CDS nodularity.  ANUS PERINEUM: Normal.    Diagnosis:  1. Postmenopausal bleeding          PLAN:    Orders Placed This Encounter    US Pelvis Comp with Transvag NON-OB (xpd       Patient was counseled today on postmenopausal bleeding and the various etiologies. This recent bleeding began the day after IC and may be related to vaginal atrophic changes.  She will have a pelvic ultrasound to for evaluation of her ES.  If thickened, she will need to return for EMBX.    Follow-up for annual exam / pap    I spent a total of 20 minutes on the day of the visit.This includes face to face time and non-face to face time preparing to see the patient (eg, review of tests), Obtaining and/or reviewing separately obtained history, Documenting clinical information in the electronic or other health record, Independently interpreting resultsand communicating results to the patient/family/caregiver, or Care coordination.      Answers submitted by the patient for this visit:  Vaginal Discharge Questionnaire  (Submitted on 11/10/2022)  Chief Complaint: Vaginal discharge  Chronicity: new  Onset: in the past 7 days  Frequency: daily  Progression since onset: unchanged  Pain severity: mild  Affected side: both  Pregnant now?: No  abdominal pain: No  anorexia: No  back pain: No  chills: No  constipation: No  diarrhea: No  discolored urine: No  dysuria: No  fever: No  flank pain: Yes  frequency: No  headaches: No  hematuria: No  painful intercourse: No  rash: No  urgency: No  vomiting: No  Vaginal bleeding: spotting  Passing clots?: No  Passing tissue?: No  Aggravated by:  nothing  treatments tried: nothing  Improvement on treatment: significant  Sexual activity: sexually active  Partner with STD symptoms: no  Menstrual history: postmenopausal  STD: No  abdominal surgery: No   section: No  Ectopic pregnancy: No  Endometriosis: No  herpes simplex: No  gynecological surgery: No  menorrhagia: No  metrorrhagia: No  miscarriage: Yes  ovarian cysts: Yes  perineal abscess: No  PID: No  terminated pregnancy: No  vaginosis: No

## 2022-11-11 ENCOUNTER — HOSPITAL ENCOUNTER (OUTPATIENT)
Dept: RADIOLOGY | Facility: HOSPITAL | Age: 52
Discharge: HOME | End: 2022-11-11
Attending: OBSTETRICS & GYNECOLOGY
Payer: COMMERCIAL

## 2022-11-11 DIAGNOSIS — N95.0 POSTMENOPAUSAL BLEEDING: ICD-10-CM

## 2022-11-11 PROCEDURE — 76830 TRANSVAGINAL US NON-OB: CPT | Mod: TC,PO

## 2022-11-12 ENCOUNTER — OFFICE VISIT (OUTPATIENT)
Dept: INTERNAL MEDICINE | Facility: CLINIC | Age: 52
End: 2022-11-12
Payer: COMMERCIAL

## 2022-11-12 DIAGNOSIS — M54.2 NECK PAIN: Primary | ICD-10-CM

## 2022-11-12 PROCEDURE — 1159F PR MEDICATION LIST DOCUMENTED IN MEDICAL RECORD: ICD-10-PCS | Mod: CPTII,95,, | Performed by: INTERNAL MEDICINE

## 2022-11-12 PROCEDURE — 99213 PR OFFICE/OUTPT VISIT, EST, LEVL III, 20-29 MIN: ICD-10-PCS | Mod: 95,,, | Performed by: INTERNAL MEDICINE

## 2022-11-12 PROCEDURE — 3044F PR MOST RECENT HEMOGLOBIN A1C LEVEL <7.0%: ICD-10-PCS | Mod: CPTII,95,, | Performed by: INTERNAL MEDICINE

## 2022-11-12 PROCEDURE — 1160F PR REVIEW ALL MEDS BY PRESCRIBER/CLIN PHARMACIST DOCUMENTED: ICD-10-PCS | Mod: CPTII,95,, | Performed by: INTERNAL MEDICINE

## 2022-11-12 PROCEDURE — 1160F RVW MEDS BY RX/DR IN RCRD: CPT | Mod: CPTII,95,, | Performed by: INTERNAL MEDICINE

## 2022-11-12 PROCEDURE — 1159F MED LIST DOCD IN RCRD: CPT | Mod: CPTII,95,, | Performed by: INTERNAL MEDICINE

## 2022-11-12 PROCEDURE — 99213 OFFICE O/P EST LOW 20 MIN: CPT | Mod: 95,,, | Performed by: INTERNAL MEDICINE

## 2022-11-12 PROCEDURE — 3044F HG A1C LEVEL LT 7.0%: CPT | Mod: CPTII,95,, | Performed by: INTERNAL MEDICINE

## 2022-11-12 RX ORDER — TIZANIDINE 4 MG/1
4 TABLET ORAL NIGHTLY
Qty: 30 TABLET | Refills: 1 | Status: SHIPPED | OUTPATIENT
Start: 2022-11-12 | End: 2022-12-22

## 2022-11-12 NOTE — PROGRESS NOTES
The patient location is: [place]  The chief complaint leading to consultation is: Neck swelling    Visit type: audiovisual    Face to Face time with patient: 20    minutes of total time spent on the encounter, which includes face to face time and non-face to face time preparing to see the patient (eg, review of tests), Obtaining and/or reviewing separately obtained history, Documenting clinical information in the electronic or other health record, Independently interpreting results (not separately reported) and communicating results to the patient/family/caregiver, or Care coordination (not separately reported).         Each patient to whom he or she provides medical services by telemedicine is:  (1) informed of the relationship between the physician and patient and the respective role of any other health care provider with respect to management of the patient; and (2) notified that he or she may decline to receive medical services by telemedicine and may withdraw from such care at any time.    Notes:   CC:  Sensation of neck swelling  HPI:  The patient is a 52 y.o. year old female who presents to the office for sensation of neck swelling.  She reports she had difficulty sleeping last night and reports sensation of swelling in the back of her head and neck.  She also reports pruritus.  She reports she has been going to gym daily for the last 10 days.  She denies any nasal congestion, postnasal drip, rhinorrhea or fever.  The patient does report she is been taking medical marijuana for anxiety.  Her symptoms have been controlled.  About 10-12 days ago, she reports she may have taken a little too much.  She was walking when she fell down backwards.  She denied any head trauma.      PAST MEDICAL HISTORY:  Past Medical History:   Diagnosis Date    Allergy     Alpha-1-antitrypsin deficiency 7/25/2022    Anxiety     Depression     Hyperlipidemia     Melanoma 1/26/2015    scalp excised by Dr Aldridge    Melanoma of scalp  1/12/2015    Open wound of scalp, complicated 2/6/2015    Pre-diabetes 7/25/2022    Thickened endometrium 06/26/2020    7 mm endometrial stripe.  This would be slightly thickened for a postmenopausal patient.  Further evaluation as clinically indicated.    Uterine fibroid 06/2020    1.1cm Small uterine fibroid identified.       SURGICAL HISTORY:  Past Surgical History:   Procedure Laterality Date    APPENDECTOMY      CHOLECYSTECTOMY      COLONOSCOPY N/A 6/2/2020    Procedure: COLONOSCOPY;  Surgeon: Quan Morton MD;  Location: [facility] ENDO (4TH FLR);  Service: Endoscopy;  Laterality: N/A;  5/13 - pt aware if drop off location and visitor policy -   COVID screening scheduled for 6/1/20 at [facility] -     ESOPHAGOGASTRODUODENOSCOPY N/A 6/2/2020    Procedure: EGD (ESOPHAGOGASTRODUODENOSCOPY);  Surgeon: Quan Morton MD;  Location: [facility] ENDO (4TH FLR);  Service: Endoscopy;  Laterality: N/A;    EYE SURGERY      GALLBLADDER SURGERY  02/2017    Scalp Reconstruction      yin-yang flaps    SKIN SURGERY      removal of cancer    TONSILLECTOMY      WLE scalp melanoma  1/26/2015       MEDS:  Medcard reviewed and updated    ALLERGIES: Allergy Card reviewed and updated    SOCIAL HISTORY:   The patient is a nonsmoker.    PE:   APPEARANCE: Well nourished, well developed, in no acute distress.    Video visit  PSYCHIATRIC: The patient is oriented to person, place, and time and has a pleasant affect.        ASSESSMENT/PLAN:  Diagnoses and all orders for this visit:    Neck pain  -    recommend trial of tizanidine   -    if symptoms fail to improve, patient should schedule an in-person visit for further evaluation     Other orders  -     tiZANidine (ZANAFLEX) 4 MG tablet; Take 1 tablet (4 mg total) by mouth every evening.        Answers submitted by the patient for this visit:  Review of Systems Questionnaire (Submitted on 11/12/2022)  activity change: No  unexpected weight change: No  neck pain: Yes  hearing loss:  No  rhinorrhea: No  trouble swallowing: No  eye discharge: No  visual disturbance: No  chest tightness: No  wheezing: Yes  chest pain: No  palpitations: No  blood in stool: No  constipation: No  vomiting: No  diarrhea: No  polydipsia: No  polyuria: No  difficulty urinating: No  hematuria: No  menstrual problem: No  dysuria: No  joint swelling: Yes  arthralgias: Yes  headaches: Yes  weakness: No  confusion: No  dysphoric mood: No

## 2022-11-14 ENCOUNTER — PATIENT MESSAGE (OUTPATIENT)
Dept: FAMILY MEDICINE | Facility: CLINIC | Age: 52
End: 2022-11-14
Payer: COMMERCIAL

## 2022-11-14 ENCOUNTER — OFFICE VISIT (OUTPATIENT)
Dept: FAMILY MEDICINE | Facility: CLINIC | Age: 52
End: 2022-11-14
Payer: COMMERCIAL

## 2022-11-14 VITALS
DIASTOLIC BLOOD PRESSURE: 80 MMHG | SYSTOLIC BLOOD PRESSURE: 124 MMHG | TEMPERATURE: 98 F | HEIGHT: 61 IN | WEIGHT: 184.94 LBS | BODY MASS INDEX: 34.92 KG/M2 | OXYGEN SATURATION: 97 % | HEART RATE: 90 BPM

## 2022-11-14 DIAGNOSIS — L21.9 ACUTE SEBORRHEIC DERMATITIS: ICD-10-CM

## 2022-11-14 PROCEDURE — 3079F PR MOST RECENT DIASTOLIC BLOOD PRESSURE 80-89 MM HG: ICD-10-PCS | Mod: CPTII,S$GLB,, | Performed by: FAMILY MEDICINE

## 2022-11-14 PROCEDURE — 3008F PR BODY MASS INDEX (BMI) DOCUMENTED: ICD-10-PCS | Mod: CPTII,S$GLB,, | Performed by: FAMILY MEDICINE

## 2022-11-14 PROCEDURE — 96372 THER/PROPH/DIAG INJ SC/IM: CPT | Mod: S$GLB,,, | Performed by: FAMILY MEDICINE

## 2022-11-14 PROCEDURE — 99213 OFFICE O/P EST LOW 20 MIN: CPT | Mod: 25,S$GLB,, | Performed by: FAMILY MEDICINE

## 2022-11-14 PROCEDURE — 3008F BODY MASS INDEX DOCD: CPT | Mod: CPTII,S$GLB,, | Performed by: FAMILY MEDICINE

## 2022-11-14 PROCEDURE — 3044F HG A1C LEVEL LT 7.0%: CPT | Mod: CPTII,S$GLB,, | Performed by: FAMILY MEDICINE

## 2022-11-14 PROCEDURE — 99213 PR OFFICE/OUTPT VISIT, EST, LEVL III, 20-29 MIN: ICD-10-PCS | Mod: 25,S$GLB,, | Performed by: FAMILY MEDICINE

## 2022-11-14 PROCEDURE — 3079F DIAST BP 80-89 MM HG: CPT | Mod: CPTII,S$GLB,, | Performed by: FAMILY MEDICINE

## 2022-11-14 PROCEDURE — 3074F SYST BP LT 130 MM HG: CPT | Mod: CPTII,S$GLB,, | Performed by: FAMILY MEDICINE

## 2022-11-14 PROCEDURE — 3044F PR MOST RECENT HEMOGLOBIN A1C LEVEL <7.0%: ICD-10-PCS | Mod: CPTII,S$GLB,, | Performed by: FAMILY MEDICINE

## 2022-11-14 PROCEDURE — 1159F MED LIST DOCD IN RCRD: CPT | Mod: CPTII,S$GLB,, | Performed by: FAMILY MEDICINE

## 2022-11-14 PROCEDURE — 96372 PR INJECTION,THERAP/PROPH/DIAG2ST, IM OR SUBCUT: ICD-10-PCS | Mod: S$GLB,,, | Performed by: FAMILY MEDICINE

## 2022-11-14 PROCEDURE — 1159F PR MEDICATION LIST DOCUMENTED IN MEDICAL RECORD: ICD-10-PCS | Mod: CPTII,S$GLB,, | Performed by: FAMILY MEDICINE

## 2022-11-14 PROCEDURE — 3074F PR MOST RECENT SYSTOLIC BLOOD PRESSURE < 130 MM HG: ICD-10-PCS | Mod: CPTII,S$GLB,, | Performed by: FAMILY MEDICINE

## 2022-11-14 RX ORDER — TRIAMCINOLONE ACETONIDE 40 MG/ML
40 INJECTION, SUSPENSION INTRA-ARTICULAR; INTRAMUSCULAR ONCE
Status: COMPLETED | OUTPATIENT
Start: 2022-11-14 | End: 2022-11-14

## 2022-11-14 RX ORDER — TRIAMCINOLONE ACETONIDE 0.25 MG/G
CREAM TOPICAL 2 TIMES DAILY
Qty: 30 G | Refills: 2
Start: 2022-11-14

## 2022-11-14 RX ADMIN — TRIAMCINOLONE ACETONIDE 40 MG: 40 INJECTION, SUSPENSION INTRA-ARTICULAR; INTRAMUSCULAR at 04:11

## 2022-11-14 NOTE — PROGRESS NOTES
"Subjective:      Patient ID: Isela Graf is a 52 y.o. female.    Chief Complaint: No chief complaint on file.      Vitals:    11/14/22 1212   BP: 124/80   Pulse: 90   Temp: 98 °F (36.7 °C)   TempSrc: Oral   SpO2: 97%   Weight: 83.9 kg (184 lb 15.5 oz)   Height: 5' 1" (1.549 m)        HPI   Rash     Problem List  Patient Active Problem List   Diagnosis    [id number] melanoma of scalp    RUQ pain    Extrinsic asthma without complication    Dyspepsia    Irritable bowel syndrome with constipation    Hyperlipidemia    Pre-diabetes    Fatty liver    Elevated liver enzymes    Obesity (BMI 30-39.9)    Alpha-1-antitrypsin deficiency    Hepatic fibrosis, stage 3        ALLERGIES:   Review of patient's allergies indicates:   Allergen Reactions    Sulfa (sulfonamide antibiotics) Other (See Comments)     Stomach pain       MEDS:   Current Outpatient Medications:     albuterol (PROVENTIL/VENTOLIN HFA) 90 mcg/actuation inhaler, USE 2 INHALATIONS EVERY 6 HOURS AS NEEDED FOR WHEEZING, Disp: 17 g, Rfl: 0    calcium carbonate (OS-ADAMA) 600 mg calcium (1,500 mg) Tab, Take 600 mg by mouth once daily., Disp: , Rfl:     citalopram (CELEXA) 10 MG tablet, Take 1 tablet (10 mg total) by mouth every evening., Disp: 30 tablet, Rfl: 3    esomeprazole (NEXIUM) 20 MG capsule, Take 1 capsule (20 mg total) by mouth before breakfast., Disp: 30 capsule, Rfl: 3    fexofenadine (ALLEGRA) 180 MG tablet, Take 180 mg by mouth as needed. , Disp: , Rfl:     fish oil-omega-3 fatty acids 300-1,000 mg capsule, Take 2 g by mouth after lunch. , Disp: , Rfl:     fluticasone propionate (FLONASE) 50 mcg/actuation nasal spray, 2 sprays (100 mcg total) by Each Nostril route daily as needed., Disp: 48 g, Rfl: 3    ketoconazole (NIZORAL) 2 % shampoo, Wash hair with medicated shampoo at least 2x/week - let sit on scalp at least 5 minutes prior to rinsing, Disp: 120 mL, Rfl: 5    MULTIVIT-IRON-MIN-FOLIC ACID 3,500-18-0.4 UNIT-MG-MG ORAL CHEW, Take 1 tablet by mouth after " lunch. , Disp: , Rfl:     mv,junior,iron,mn/folic acid/chol (BODY, HAIR, SKIN AND NAILS ORAL), Take by mouth., Disp: , Rfl:     psyllium (METAMUCIL) powder, Take 1 packet by mouth once daily., Disp: , Rfl:     rosuvastatin (CRESTOR) 20 MG tablet, Take 1 tablet (20 mg total) by mouth every evening., Disp: 30 tablet, Rfl: 3    tiZANidine (ZANAFLEX) 4 MG tablet, Take 1 tablet (4 mg total) by mouth every evening., Disp: 30 tablet, Rfl: 1    triamcinolone acetonide 0.025% (KENALOG) 0.025 % cream, Apply topically 2 (two) times daily. To affected areas on face x 1-2 wks then prn flares only, Disp: 30 g, Rfl: 2      History:  Current Providers as of 11/14/2022  PCP: Carlos Santiago MD  Care Team Provider: Orville Bird MD  Care Team Provider: Stephanie Hughes LPN  Care Team Provider: Quan Morton MD  Care Team Provider: Philip Ochoa MD  Care Team Provider: Gabriela Green NP  Encounter Provider: Carlos Santiago MD, starting on Mon Nov 14, 2022 12:00 AM  Referring Provider: not found, starting on Mon Nov 14, 2022 12:00 AM  Consulting Physician: Carlos Santiago MD, starting on Mon Nov 14, 2022 12:15 PM (Active)   Past Medical History:   Diagnosis Date    Allergy     Alpha-1-antitrypsin deficiency 7/25/2022    Anxiety     Depression     Hyperlipidemia     Melanoma 1/26/2015    scalp excised by Dr Aldridge    Melanoma of scalp 1/12/2015    Open wound of scalp, complicated 2/6/2015    Pre-diabetes 7/25/2022    Thickened endometrium 06/26/2020    7 mm endometrial stripe.  This would be slightly thickened for a postmenopausal patient.  Further evaluation as clinically indicated.    Uterine fibroid 06/2020    1.1cm Small uterine fibroid identified.     Past Surgical History:   Procedure Laterality Date    APPENDECTOMY      CHOLECYSTECTOMY      COLONOSCOPY N/A 06/02/2020    Procedure: COLONOSCOPY;  Surgeon: Quan Morton MD;  Location: [facility] ([address] [place] [address]);  Service: Endoscopy;  Laterality: N/A;  5/13 - pt aware if drop  off location and visitor policy -   COVID screening scheduled for 6/1/20 at [facility] -     ESOPHAGOGASTRODUODENOSCOPY N/A 06/02/2020    Procedure: EGD (ESOPHAGOGASTRODUODENOSCOPY);  Surgeon: Quan Morton MD;  Location: [facility] ([address] [place] [address]);  Service: Endoscopy;  Laterality: N/A;    EYE SURGERY      GALLBLADDER SURGERY  02/2017    Scalp Reconstruction      yin-yang flaps    SKIN SURGERY      removal of cancer    TONSILLECTOMY      WLE scalp melanoma  01/26/2015     Social History     Tobacco Use    Smoking status: Never    Smokeless tobacco: Never   Substance Use Topics    Alcohol use: Yes     Alcohol/week: 1.0 standard drink     Types: 1 Glasses of wine per week     Comment: ~1 bottle of wine nightly x years, stopped 7/2022    Drug use: Yes     Types: Marijuana     Comment: medical thc gummy         Review of Systems   Constitutional: Negative.    HENT: Negative.     Respiratory: Negative.     Cardiovascular: Negative.    Gastrointestinal: Negative.    Endocrine: Negative.    Genitourinary: Negative.    Musculoskeletal: Negative.    Skin:  Positive for rash.   Psychiatric/Behavioral: Negative.     All other systems reviewed and are negative.  Objective:     Physical Exam  Vitals and nursing note reviewed.   Constitutional:       Appearance: She is well-developed.   HENT:      Head: Normocephalic.   Eyes:      Conjunctiva/sclera: Conjunctivae normal.      Pupils: Pupils are equal, round, and reactive to light.   Cardiovascular:      Rate and Rhythm: Normal rate and regular rhythm.      Heart sounds: Normal heart sounds.   Pulmonary:      Effort: Pulmonary effort is normal.      Breath sounds: Normal breath sounds.   Musculoskeletal:         General: Normal range of motion.      Cervical back: Normal range of motion and neck supple.   Skin:     General: Skin is warm and dry.   Neurological:      Mental Status: She is alert and oriented to person, place, and time.      Deep Tendon Reflexes: Reflexes are  normal and symmetric.   Psychiatric:         Behavior: Behavior normal.         Thought Content: Thought content normal.         Judgment: Judgment normal.           Assessment:     1. Acute seborrheic dermatitis      Plan:        Medication List            Accurate as of November 14, 2022 11:59 PM. If you have any questions, ask your nurse or doctor.                CONTINUE taking these medications      albuterol 90 mcg/actuation inhaler  Commonly known as: PROVENTIL/VENTOLIN HFA  USE 2 INHALATIONS EVERY 6 HOURS AS NEEDED FOR WHEEZING     BODY, HAIR, SKIN AND NAILS ORAL     calcium carbonate 600 mg calcium (1,500 mg) Tab  Commonly known as: OS-ADAMA     citalopram 10 MG tablet  Commonly known as: CeleXA  Take 1 tablet (10 mg total) by mouth every evening.     esomeprazole 20 MG capsule  Commonly known as: NEXIUM  Take 1 capsule (20 mg total) by mouth before breakfast.     fexofenadine 180 MG tablet  Commonly known as: ALLEGRA     fish oil-omega-3 fatty acids 300-1,000 mg capsule     fluticasone propionate 50 mcg/actuation nasal spray  Commonly known as: FLONASE  2 sprays (100 mcg total) by Each Nostril route daily as needed.     ketoconazole 2 % shampoo  Commonly known as: NIZORAL  Wash hair with medicated shampoo at least 2x/week - let sit on scalp at least 5 minutes prior to rinsing     multivit-iron-min-folic acid 3,500-18-0.4 unit-mg-mg Chew     psyllium powder  Commonly known as: METAMUCIL     rosuvastatin 20 MG tablet  Commonly known as: CRESTOR  Take 1 tablet (20 mg total) by mouth every evening.     tiZANidine 4 MG tablet  Commonly known as: ZANAFLEX  Take 1 tablet (4 mg total) by mouth every evening.     triamcinolone acetonide 0.025% 0.025 % cream  Commonly known as: KENALOG  Apply topically 2 (two) times daily. To affected areas on face x 1-2 wks then prn flares only               Where to Get Your Medications        Information about where to get these medications is not yet available    Ask your nurse or  doctor about these medications  triamcinolone acetonide 0.025% 0.025 % cream       Acute seborrheic dermatitis  -     triamcinolone acetonide 0.025% (KENALOG) 0.025 % cream; Apply topically 2 (two) times daily. To affected areas on face x 1-2 wks then prn flares only  Dispense: 30 g; Refill: 2    Other orders  -     triamcinolone acetonide injection 40 mg

## 2022-11-17 ENCOUNTER — TELEPHONE (OUTPATIENT)
Dept: OBSTETRICS AND GYNECOLOGY | Facility: CLINIC | Age: 52
End: 2022-11-17
Payer: COMMERCIAL

## 2022-11-17 NOTE — TELEPHONE ENCOUNTER
Called patient:    Message left to call us.    [ES focal thickenin.8 cm - needs EMBX, probable hysteroscopy]

## 2022-11-21 ENCOUNTER — TELEPHONE (OUTPATIENT)
Dept: OBSTETRICS AND GYNECOLOGY | Facility: CLINIC | Age: 52
End: 2022-11-21
Payer: COMMERCIAL

## 2022-11-21 NOTE — TELEPHONE ENCOUNTER
Patient was returning phone call, patient states she will be home 11/22/22 all day for return call.

## 2022-11-29 ENCOUNTER — PATIENT MESSAGE (OUTPATIENT)
Dept: OBSTETRICS AND GYNECOLOGY | Facility: CLINIC | Age: 52
End: 2022-11-29
Payer: COMMERCIAL

## 2022-11-29 ENCOUNTER — TELEPHONE (OUTPATIENT)
Dept: OBSTETRICS AND GYNECOLOGY | Facility: CLINIC | Age: 52
End: 2022-11-29
Payer: COMMERCIAL

## 2022-11-29 NOTE — TELEPHONE ENCOUNTER
----- Message from Lola Santa sent at 11/29/2022  1:34 PM CST -----   Name of Who is Calling:     What is the request in detail:  patient request call back in reference to reschedule appointment Please contact to further discuss and advise      Can the clinic reply by [facility]:     [facility]  [phone]

## 2022-11-30 ENCOUNTER — HOSPITAL ENCOUNTER (OUTPATIENT)
Dept: RADIOLOGY | Facility: HOSPITAL | Age: 52
Discharge: HOME | End: 2022-11-30
Attending: OBSTETRICS & GYNECOLOGY
Payer: COMMERCIAL

## 2022-11-30 ENCOUNTER — TELEPHONE (OUTPATIENT)
Dept: OBSTETRICS AND GYNECOLOGY | Facility: CLINIC | Age: 52
End: 2022-11-30
Payer: COMMERCIAL

## 2022-11-30 ENCOUNTER — PATIENT MESSAGE (OUTPATIENT)
Dept: OBSTETRICS AND GYNECOLOGY | Facility: CLINIC | Age: 52
End: 2022-11-30
Payer: COMMERCIAL

## 2022-11-30 DIAGNOSIS — Z12.31 SCREENING MAMMOGRAM, ENCOUNTER FOR: ICD-10-CM

## 2022-11-30 PROCEDURE — 77063 BREAST TOMOSYNTHESIS BI: CPT | Mod: TC,PO

## 2022-11-30 NOTE — TELEPHONE ENCOUNTER
----- Message from Klarissa Tavarez sent at 11/30/2022 10:20 AM CST -----  Regarding: nurse call back  Name of Who is Calling:WAI SANCHEZ [[id number]]          What is the request in detail: Pt is asking if the nurse would call her back to schedule her for her procedure. Please assist           Can the clinic reply by [facility]:no           What Number to Call Back if not in [facility]:[phone]

## 2022-11-30 NOTE — TELEPHONE ENCOUNTER
Called patient:    Discussed results of pelvic sono:    FINDINGS:  Uterus:  Size: 4.2 x 4.6 x 7.9 cm  Masses: There is an anechoic area in the posterior fundus 3 x 4 x 6 mm.  Endometrium: Abnormal in this post menopausal patient, irregular in appearance with a somewhat focal area in the fundus measuring 1.6 x 1.8 x 1.8 cm.  Follow-up suggested.  Right ovary:  Size: 1.1 x 1.7 x 1.8 cm  Appearance: Normal  Vascular flow: Not assessed  Left ovary:  Not visualized.  Free Fluid:  None.  Impression:  Abnormal study.  Focal hyperechoic thickening of the endometrium within the fundus.  Follow-up suggested.  Limited assessment of the ovaries.    We discussed her significantly thickened endometrium and recommendation for initially forming EMBX.

## 2022-11-30 NOTE — TELEPHONE ENCOUNTER
Patient scheduled for annual exam 12/15- EMB added to appointment. Attempt reaching patient / left voice mail message. Sent a portal message.

## 2022-12-13 ENCOUNTER — TELEPHONE (OUTPATIENT)
Dept: OBSTETRICS AND GYNECOLOGY | Facility: CLINIC | Age: 52
End: 2022-12-13
Payer: COMMERCIAL

## 2022-12-13 NOTE — TELEPHONE ENCOUNTER
----- Message from Michele Chaparro sent at 12/13/2022  4:40 PM CST -----  Regarding: Appt confirm  Contact: WAI SANCHEZ [[id number]]  Type:  Patient Returning Call    Who Called: WAI SANCHEZ [[id number]]    Who Left Message for Patient:     Does the patient know what this is regarding?:yes    Would the patient rather a call back or a response via [facility]? CALL    Best Call Back Number: [phone]    Additional Information:  Pt confirming to come in on 12/15 at 12:15P. Please confirm

## 2022-12-13 NOTE — TELEPHONE ENCOUNTER
ANGELIQUE to have the patient call the office [facility] with added EMX to be moved to 1:3 from 1:45 to accommodate both appointments

## 2022-12-15 ENCOUNTER — PROCEDURE VISIT (OUTPATIENT)
Dept: OBSTETRICS AND GYNECOLOGY | Facility: CLINIC | Age: 52
End: 2022-12-15
Attending: OBSTETRICS & GYNECOLOGY
Payer: COMMERCIAL

## 2022-12-15 VITALS — WEIGHT: 183.44 LBS | HEIGHT: 62 IN | BODY MASS INDEX: 33.76 KG/M2

## 2022-12-15 DIAGNOSIS — Z12.4 PAP SMEAR FOR CERVICAL CANCER SCREENING: ICD-10-CM

## 2022-12-15 DIAGNOSIS — R93.89 THICKENED ENDOMETRIUM: ICD-10-CM

## 2022-12-15 DIAGNOSIS — N95.0 POSTMENOPAUSAL BLEEDING: Primary | ICD-10-CM

## 2022-12-15 LAB
B-HCG UR QL: NEGATIVE
CTP QC/QA: YES

## 2022-12-15 PROCEDURE — 58100 BIOPSY OF UTERUS LINING: CPT | Mod: S$GLB,,, | Performed by: OBSTETRICS & GYNECOLOGY

## 2022-12-15 PROCEDURE — 87624 HPV HI-RISK TYP POOLED RSLT: CPT | Performed by: OBSTETRICS & GYNECOLOGY

## 2022-12-15 PROCEDURE — 88305 TISSUE EXAM BY PATHOLOGIST: CPT | Performed by: PATHOLOGY

## 2022-12-15 PROCEDURE — 81025 URINE PREGNANCY TEST: CPT | Mod: S$GLB,,, | Performed by: OBSTETRICS & GYNECOLOGY

## 2022-12-15 PROCEDURE — 81025 POCT URINE PREGNANCY: ICD-10-PCS | Mod: S$GLB,,, | Performed by: OBSTETRICS & GYNECOLOGY

## 2022-12-15 PROCEDURE — 58100 PR BIOPSY OF UTERUS LINING: ICD-10-PCS | Mod: S$GLB,,, | Performed by: OBSTETRICS & GYNECOLOGY

## 2022-12-15 PROCEDURE — 88305 TISSUE EXAM BY PATHOLOGIST: ICD-10-PCS | Mod: 26,,, | Performed by: PATHOLOGY

## 2022-12-15 PROCEDURE — 88175 CYTOPATH C/V AUTO FLUID REDO: CPT | Performed by: OBSTETRICS & GYNECOLOGY

## 2022-12-15 PROCEDURE — 88305 TISSUE EXAM BY PATHOLOGIST: CPT | Mod: 26,,, | Performed by: PATHOLOGY

## 2022-12-15 RX ORDER — TRAZODONE HYDROCHLORIDE 50 MG/1
50 TABLET ORAL NIGHTLY
COMMUNITY
End: 2023-03-30 | Stop reason: CLARIF

## 2022-12-15 NOTE — PROCEDURES
CC: ENDOMETRIAL BIOPSPY    Isela Graf is a 52 y.o. female  presents for an endometrial biopsy secondary to an episode of postmenopausal bleeding and thickened endometrium noted on ultrasound.    11/10/22 Note:  She reports that she has not had any bleeding for several years.  About 2 weeks ago, she describes having light pinkish spotting that lasted for about 7 days in duration. This spotting began the day after having IC.  Denies pelvic pain or fever.  Reports hot flashes / sweats.  Pelvic sono 21 for evaluation of pelvic pain showed an 8 mm ES for which she was evaluated with EMBX.    22 Pelvic sono:  FINDINGS:  Uterus:  Size: 4.2 x 4.6 x 7.9 cm  Masses: There is an anechoic area in the  posterior fundus 3 x 4 x 6 mm.  Endometrium: Abnormal in this post menopausal patient, irregular in appearance with a somewhat focal area in the fundus measuring 1.6 x 1.8 x 1.8 cm.  Follow-up  suggested.  Right ovary:  Size: 1.1 x 1.7 x 1.8 cm  Appearance: Normal  Vascular flow: Not assessed  Left ovary:  Not visualized.  Free Fluid:  None.  Impression:  Abnormal study.  Focal hyperechoic thickening of the endometrium within the fundus.  Follow-up suggested.  Limited assessment of the ovaries.    UPT is negative    PE:   (chaperone present during entire exam)  APPEARANCE: Well nourished, well developed, in no acute distress.  BREASTS: Symmetrical, no skin changes or visible lesions. No palpable masses, nipple discharge or adenopathy bilaterally.  ABDOMEN: Soft. No tenderness or masses. No CVA tenderness.  VULVA: No lesions. Normal female genitalia.  URETHRAL MEATUS: Normal size and location, no lesions, no prolapse.  URETHRA: No masses, tenderness, prolapse or scarring.  VAGINA: No lesions, no abnormal discharge, no blood / bleeding seen.  CERVIX: No lesions and discharge. Pap performed.  UTERUS: Normal size, regular shape, mobile, non-tender, bladder base nontender.  ADNEXA: No masses, tenderness or CDS  nodularity.  ANUS PERINEUM: Normal.    PRE ENDOMETRIAL BIOPSY COUNSELING:  The patient was informed of the risk of bleeding, infection, uterine perforation and pain and that the test will rule-out endometrial cancer with accuracy greater than 95%. She was counseled on the alternatives to endometrial biopsy and agrees to proceed.    TIME OUT PERFORMED.  The cervix was visualized with a speculum and prepped with betadine  A sterile endometrial pipelle was easily passed without difficulty to a depth of 8 cm.  Minimal endometrial tissue was obtained.  The specimen was placed in formalyn and sent to Pathology for histology evaluation. The patient tolerated the procedure well.    ASSESSMENT and PLAN  1. Postmenopausal bleeding    2. Thickened endometrium    3. Pap smear for cervical cancer screening        POST ENDOMETRIAL BIOPSY COUNSELING:  Manage post biopsy cramping with NSAIDs or Tylenol.  Expect spotting or light bleeding for a few days.  Report bleeding heavier than a period, fever > 101.0 F, worsening pain or a foul smelling vaginal discharge.    FOLLOW-UP: Pending biopsy results.  We will contact her to discuss biopsy results and treatment plan.

## 2022-12-21 LAB
FINAL PATHOLOGIC DIAGNOSIS: NORMAL
Lab: NORMAL

## 2022-12-22 ENCOUNTER — PATIENT MESSAGE (OUTPATIENT)
Dept: OBSTETRICS AND GYNECOLOGY | Facility: CLINIC | Age: 52
End: 2022-12-22
Payer: COMMERCIAL

## 2022-12-22 RX ORDER — TIZANIDINE 4 MG/1
4 TABLET ORAL EVERY 8 HOURS
Qty: 30 TABLET | Refills: 0 | Status: SHIPPED | OUTPATIENT
Start: 2022-12-22 | End: 2023-01-29

## 2022-12-23 ENCOUNTER — TELEPHONE (OUTPATIENT)
Dept: HEPATOLOGY | Facility: CLINIC | Age: 52
End: 2022-12-23
Payer: COMMERCIAL

## 2022-12-23 NOTE — TELEPHONE ENCOUNTER
Pt has not scheduled f/u testing, so needs a f/u appt to discuss further. Please contact pt to schedule f/u with me soon, video visit ok if she prefers    Thanks

## 2022-12-28 LAB
FINAL PATHOLOGIC DIAGNOSIS: NORMAL
GROSS: NORMAL
HPV HR 12 DNA SPEC QL NAA+PROBE: NEGATIVE
HPV16 AG SPEC QL: NEGATIVE
HPV18 DNA SPEC QL NAA+PROBE: NEGATIVE
Lab: NORMAL

## 2023-01-04 ENCOUNTER — TELEPHONE (OUTPATIENT)
Dept: OBSTETRICS AND GYNECOLOGY | Facility: CLINIC | Age: 53
End: 2023-01-04
Payer: COMMERCIAL

## 2023-01-05 ENCOUNTER — TELEPHONE (OUTPATIENT)
Dept: OBSTETRICS AND GYNECOLOGY | Facility: CLINIC | Age: 53
End: 2023-01-05
Payer: COMMERCIAL

## 2023-01-09 NOTE — TELEPHONE ENCOUNTER
Called patient:    Discussed results of EMBX;    ENDOMETRIUM, BIOPSY:   - Definitive endometrial tissue is not identified.   - Rare benign endocervical glands.   - Sample is suboptimal for evaluation of the process in question.    However, pelvic sono with 1.8 cm abnormal area in endometrium    REC: D&C, hysteroscopy.    She is currently out of town and will call us when she returns to discuss scheduling.

## 2023-01-20 ENCOUNTER — TELEPHONE (OUTPATIENT)
Dept: OBSTETRICS AND GYNECOLOGY | Facility: CLINIC | Age: 53
End: 2023-01-20
Payer: COMMERCIAL

## 2023-01-21 NOTE — TELEPHONE ENCOUNTER
Called patient to discuss scheduling D&C / hysteroscopy.    She is out of town again and will contact us next week when she is a home to discuss scheduling in more detail.

## 2023-01-28 NOTE — TELEPHONE ENCOUNTER
No new care gaps identified.  [facility] Embedded Care Gaps. Reference number: [id number]. 1/28/2023   12:24:29 AM CST

## 2023-01-29 RX ORDER — TIZANIDINE 4 MG/1
TABLET ORAL
Qty: 30 TABLET | Refills: 35 | Status: SHIPPED | OUTPATIENT
Start: 2023-01-29 | End: 2023-02-03 | Stop reason: SDUPTHER

## 2023-01-30 ENCOUNTER — TELEPHONE (OUTPATIENT)
Dept: OBSTETRICS AND GYNECOLOGY | Facility: CLINIC | Age: 53
End: 2023-01-30

## 2023-02-07 ENCOUNTER — PATIENT MESSAGE (OUTPATIENT)
Dept: OBSTETRICS AND GYNECOLOGY | Facility: CLINIC | Age: 53
End: 2023-02-07
Payer: COMMERCIAL

## 2023-02-07 ENCOUNTER — PATIENT MESSAGE (OUTPATIENT)
Dept: HEPATOLOGY | Facility: CLINIC | Age: 53
End: 2023-02-07
Payer: COMMERCIAL

## 2023-02-07 ENCOUNTER — TELEPHONE (OUTPATIENT)
Dept: OBSTETRICS AND GYNECOLOGY | Facility: CLINIC | Age: 53
End: 2023-02-07
Payer: COMMERCIAL

## 2023-02-07 ENCOUNTER — PATIENT MESSAGE (OUTPATIENT)
Dept: FAMILY MEDICINE | Facility: CLINIC | Age: 53
End: 2023-02-07
Payer: COMMERCIAL

## 2023-02-08 ENCOUNTER — PATIENT MESSAGE (OUTPATIENT)
Dept: OBSTETRICS AND GYNECOLOGY | Facility: CLINIC | Age: 53
End: 2023-02-08
Payer: COMMERCIAL

## 2023-02-08 ENCOUNTER — TELEPHONE (OUTPATIENT)
Dept: OBSTETRICS AND GYNECOLOGY | Facility: CLINIC | Age: 53
End: 2023-02-08
Payer: COMMERCIAL

## 2023-02-08 NOTE — TELEPHONE ENCOUNTER
Patient was offered 3/3/2023, does this day work for all parties?     Estrada, Itzel Santiago,  Gabriela  Jefferson     I am trying to coordinate having 2 Outpatient procedures done at the same time.  Dr Ochoa wants to do a D&C hysteroscopy for my menopausal bleeding.     And my hepatologist wants a liver biopsy.     Please set up a conference among yourselves to discuss, you also can include myself and my  if needed.     Sincerely, rosy with heart felt gratitude,  Isela SANCHEZ...

## 2023-02-10 ENCOUNTER — PATIENT MESSAGE (OUTPATIENT)
Dept: HEPATOLOGY | Facility: CLINIC | Age: 53
End: 2023-02-10
Payer: COMMERCIAL

## 2023-02-13 NOTE — TELEPHONE ENCOUNTER
Dr Ochoa reviewed the chart and the procedure would need to be done at [facility]. We are planning to schedule the patient for 3/3/23. Please let me know if you would like to proceed at [facility] on this day. Thank you.

## 2023-02-16 ENCOUNTER — LAB VISIT (OUTPATIENT)
Dept: LAB | Facility: HOSPITAL | Age: 53
End: 2023-02-16
Attending: FAMILY MEDICINE
Payer: COMMERCIAL

## 2023-02-16 DIAGNOSIS — K76.0 FATTY LIVER: ICD-10-CM

## 2023-02-16 DIAGNOSIS — E66.9 OBESITY (BMI 30-39.9): ICD-10-CM

## 2023-02-16 DIAGNOSIS — R73.03 PRE-DIABETES: ICD-10-CM

## 2023-02-16 DIAGNOSIS — Z23 FLU VACCINE NEED: ICD-10-CM

## 2023-02-16 LAB
ALBUMIN SERPL BCP-MCNC: 5.4 G/DL (ref 3.5–5.2)
ALP SERPL-CCNC: 139 U/L (ref 38–126)
ALT SERPL W/O P-5'-P-CCNC: 38 U/L (ref 10–44)
ANION GAP SERPL CALC-SCNC: 10 MMOL/L (ref 8–16)
AST SERPL-CCNC: 39 U/L (ref 15–46)
BASOPHILS # BLD AUTO: 0.06 K/UL (ref 0–0.2)
BASOPHILS NFR BLD: 0.6 % (ref 0–1.9)
BILIRUB SERPL-MCNC: 0.5 MG/DL (ref 0.1–1)
CALCIUM SERPL-MCNC: 9.8 MG/DL (ref 8.7–10.5)
CHLORIDE SERPL-SCNC: 103 MMOL/L (ref 95–110)
CHOLEST SERPL-MCNC: 253 MG/DL (ref 120–199)
CHOLEST/HDLC SERPL: 3.9 {RATIO} (ref 2–5)
CO2 SERPL-SCNC: 27 MMOL/L (ref 23–29)
CREAT SERPL-MCNC: 0.72 MG/DL (ref 0.5–1.4)
DIFFERENTIAL METHOD: NORMAL
EOSINOPHIL # BLD AUTO: 0.1 K/UL (ref 0–0.5)
EOSINOPHIL NFR BLD: 1.3 % (ref 0–8)
ERYTHROCYTE [DISTWIDTH] IN BLOOD BY AUTOMATED COUNT: 13.9 % (ref 11.5–14.5)
EST. GFR  (NO RACE VARIABLE): >60 ML/MIN/1.73 M^2
GLUCOSE SERPL-MCNC: 122 MG/DL (ref 70–110)
HCT VFR BLD AUTO: 42.4 % (ref 37–48.5)
HDLC SERPL-MCNC: 65 MG/DL (ref 40–75)
HDLC SERPL: 25.7 % (ref 20–50)
HGB BLD-MCNC: 14.2 G/DL (ref 12–16)
IMM GRANULOCYTES # BLD AUTO: 0.02 K/UL (ref 0–0.04)
IMM GRANULOCYTES NFR BLD AUTO: 0.2 % (ref 0–0.5)
LDLC SERPL CALC-MCNC: 124.2 MG/DL (ref 63–159)
LYMPHOCYTES # BLD AUTO: 2.6 K/UL (ref 1–4.8)
LYMPHOCYTES NFR BLD: 25.4 % (ref 18–48)
MCH RBC QN AUTO: 29.5 PG (ref 27–31)
MCHC RBC AUTO-ENTMCNC: 33.5 G/DL (ref 32–36)
MCV RBC AUTO: 88 FL (ref 82–98)
MONOCYTES # BLD AUTO: 0.7 K/UL (ref 0.3–1)
MONOCYTES NFR BLD: 6.5 % (ref 4–15)
NEUTROPHILS # BLD AUTO: 6.9 K/UL (ref 1.8–7.7)
NEUTROPHILS NFR BLD: 66 % (ref 38–73)
NONHDLC SERPL-MCNC: 188 MG/DL
NRBC BLD-RTO: 0 /100 WBC
PLATELET # BLD AUTO: 366 K/UL (ref 150–450)
PMV BLD AUTO: 9.6 FL (ref 9.2–12.9)
POTASSIUM SERPL-SCNC: 4.4 MMOL/L (ref 3.5–5.1)
PROT SERPL-MCNC: 8.7 G/DL (ref 6–8.4)
RBC # BLD AUTO: 4.82 M/UL (ref 4–5.4)
SODIUM SERPL-SCNC: 140 MMOL/L (ref 136–145)
TRIGL SERPL-MCNC: 319 MG/DL (ref 30–150)
UUN UR-MCNC: 10 MG/DL (ref 7–17)
WBC # BLD AUTO: 10.4 K/UL (ref 3.9–12.7)

## 2023-02-16 PROCEDURE — 85025 COMPLETE CBC W/AUTO DIFF WBC: CPT | Mod: PO | Performed by: FAMILY MEDICINE

## 2023-02-16 PROCEDURE — 80053 COMPREHEN METABOLIC PANEL: CPT | Mod: PO | Performed by: FAMILY MEDICINE

## 2023-02-16 PROCEDURE — 36415 COLL VENOUS BLD VENIPUNCTURE: CPT | Mod: PO | Performed by: FAMILY MEDICINE

## 2023-02-16 PROCEDURE — 80061 LIPID PANEL: CPT | Performed by: FAMILY MEDICINE

## 2023-02-20 ENCOUNTER — OFFICE VISIT (OUTPATIENT)
Dept: FAMILY MEDICINE | Facility: CLINIC | Age: 53
End: 2023-02-20
Payer: COMMERCIAL

## 2023-02-20 ENCOUNTER — TELEPHONE (OUTPATIENT)
Dept: FAMILY MEDICINE | Facility: CLINIC | Age: 53
End: 2023-02-20

## 2023-02-20 VITALS
BODY MASS INDEX: 35.07 KG/M2 | TEMPERATURE: 98 F | OXYGEN SATURATION: 97 % | HEIGHT: 62 IN | WEIGHT: 190.56 LBS | DIASTOLIC BLOOD PRESSURE: 80 MMHG | HEART RATE: 96 BPM | SYSTOLIC BLOOD PRESSURE: 124 MMHG

## 2023-02-20 DIAGNOSIS — R73.03 PRE-DIABETES: Primary | ICD-10-CM

## 2023-02-20 DIAGNOSIS — E66.01 SEVERE OBESITY (BMI 35.0-39.9) WITH COMORBIDITY: ICD-10-CM

## 2023-02-20 DIAGNOSIS — C43.4 MELANOMA OF SCALP: ICD-10-CM

## 2023-02-20 DIAGNOSIS — E88.01 ALPHA-1-ANTITRYPSIN DEFICIENCY: ICD-10-CM

## 2023-02-20 PROCEDURE — 3079F PR MOST RECENT DIASTOLIC BLOOD PRESSURE 80-89 MM HG: ICD-10-PCS | Mod: CPTII,S$GLB,, | Performed by: FAMILY MEDICINE

## 2023-02-20 PROCEDURE — 99214 PR OFFICE/OUTPT VISIT, EST, LEVL IV, 30-39 MIN: ICD-10-PCS | Mod: S$GLB,,, | Performed by: FAMILY MEDICINE

## 2023-02-20 PROCEDURE — 1159F PR MEDICATION LIST DOCUMENTED IN MEDICAL RECORD: ICD-10-PCS | Mod: CPTII,S$GLB,, | Performed by: FAMILY MEDICINE

## 2023-02-20 PROCEDURE — 3008F PR BODY MASS INDEX (BMI) DOCUMENTED: ICD-10-PCS | Mod: CPTII,S$GLB,, | Performed by: FAMILY MEDICINE

## 2023-02-20 PROCEDURE — 3008F BODY MASS INDEX DOCD: CPT | Mod: CPTII,S$GLB,, | Performed by: FAMILY MEDICINE

## 2023-02-20 PROCEDURE — 3074F SYST BP LT 130 MM HG: CPT | Mod: CPTII,S$GLB,, | Performed by: FAMILY MEDICINE

## 2023-02-20 PROCEDURE — 3079F DIAST BP 80-89 MM HG: CPT | Mod: CPTII,S$GLB,, | Performed by: FAMILY MEDICINE

## 2023-02-20 PROCEDURE — 99214 OFFICE O/P EST MOD 30 MIN: CPT | Mod: S$GLB,,, | Performed by: FAMILY MEDICINE

## 2023-02-20 PROCEDURE — 3074F PR MOST RECENT SYSTOLIC BLOOD PRESSURE < 130 MM HG: ICD-10-PCS | Mod: CPTII,S$GLB,, | Performed by: FAMILY MEDICINE

## 2023-02-20 PROCEDURE — 1159F MED LIST DOCD IN RCRD: CPT | Mod: CPTII,S$GLB,, | Performed by: FAMILY MEDICINE

## 2023-02-20 RX ORDER — TRIAMCINOLONE ACETONIDE 40 MG/ML
40 INJECTION, SUSPENSION INTRA-ARTICULAR; INTRAMUSCULAR ONCE
Status: COMPLETED | OUTPATIENT
Start: 2023-02-20 | End: 2023-02-22

## 2023-02-20 NOTE — PROGRESS NOTES
"Subjective:      Patient ID: Isela Graf is a 52 y.o. female.    Chief Complaint: Follow-up      Vitals:    02/20/23 1545   BP: 124/80   Pulse: 96   Temp: 98.2 °F (36.8 °C)   TempSrc: Oral   SpO2: 97%   Weight: 86.4 kg (190 lb 9.4 oz)   Height: 5' 1.5" (1.562 m)        HPI   Follow up below; wants a steroid shot; chest congestion, sinus pressure, headache will be getting PFT  Has appt with Scroggs  Back with wine and gained weight  LFT up again, tot chol up and TG  Will order another a1c  Rec: lose weight, stop all alchohol    Problem List  Patient Active Problem List   Diagnosis    [id number] melanoma of scalp    RUQ pain    Extrinsic asthma without complication    Dyspepsia    Irritable bowel syndrome with constipation    Hyperlipidemia    Pre-diabetes    Fatty liver    Elevated liver enzymes    Obesity (BMI 30-39.9)    Alpha-1-antitrypsin deficiency    Hepatic fibrosis, stage 3        ALLERGIES:   Review of patient's allergies indicates:   Allergen Reactions    Sulfa (sulfonamide antibiotics) Other (See Comments)     Stomach pain       MEDS:   Current Outpatient Medications:     albuterol (PROVENTIL/VENTOLIN HFA) 90 mcg/actuation inhaler, USE 2 INHALATIONS EVERY 6 HOURS AS NEEDED FOR WHEEZING, Disp: 17 g, Rfl: 0    calcium carbonate (OS-ADAMA) 600 mg calcium (1,500 mg) Tab, Take 600 mg by mouth once daily., Disp: , Rfl:     citalopram (CELEXA) 10 MG tablet, Take 1 tablet (10 mg total) by mouth every evening., Disp: 30 tablet, Rfl: 3    esomeprazole (NEXIUM) 20 MG capsule, Take 1 capsule (20 mg total) by mouth before breakfast., Disp: 30 capsule, Rfl: 3    fexofenadine (ALLEGRA) 180 MG tablet, Take 180 mg by mouth as needed. , Disp: , Rfl:     fish oil-omega-3 fatty acids 300-1,000 mg capsule, Take 2 g by mouth after lunch. , Disp: , Rfl:     fluticasone propionate (FLONASE) 50 mcg/actuation nasal spray, 2 sprays (100 mcg total) by Each Nostril route daily as needed., Disp: 48 g, Rfl: 3    MULTIVIT-IRON-MIN-FOLIC " ACID 3,500-18-0.4 UNIT-MG-MG ORAL CHEW, Take 1 tablet by mouth after lunch. , Disp: , Rfl:     mv,junior,iron,mn/folic acid/chol (BODY, HAIR, SKIN AND NAILS ORAL), Take by mouth., Disp: , Rfl:     psyllium (METAMUCIL) powder, Take 1 packet by mouth once daily., Disp: , Rfl:     rosuvastatin (CRESTOR) 20 MG tablet, Take 1 tablet (20 mg total) by mouth every evening., Disp: 30 tablet, Rfl: 3    tiZANidine (ZANAFLEX) 4 MG tablet, Take 1 tablet (4 mg total) by mouth every 8 (eight) hours., Disp: 30 tablet, Rfl: 35    ketoconazole (NIZORAL) 2 % shampoo, Wash hair with medicated shampoo at least 2x/week - let sit on scalp at least 5 minutes prior to rinsing (Patient not taking: Reported on 2/20/2023), Disp: 120 mL, Rfl: 5    traZODone (DESYREL) 50 MG tablet, Take 50 mg by mouth every evening., Disp: , Rfl:     triamcinolone acetonide 0.025% (KENALOG) 0.025 % cream, Apply topically 2 (two) times daily. To affected areas on face x 1-2 wks then prn flares only (Patient not taking: Reported on 2/20/2023), Disp: 30 g, Rfl: 2      History:  Current Providers as of 2/20/2023  PCP: Carlos Santiago MD  Care Team Provider: Orville Bird MD  Care Team Provider: Stephanie Hughes LPN  Care Team Provider: Quan Morton MD  Care Team Provider: Philip Ochoa MD  Care Team Provider: Garbiela Green NP  Encounter Provider: Carlos Santiago MD, starting on Mon Feb 20, 2023 12:00 AM  Referring Provider: not found, starting on Mon Feb 20, 2023 12:00 AM  Consulting Physician: Carlos Santiago MD, starting on Mon Feb 20, 2023  3:31 PM (Active)   Past Medical History:   Diagnosis Date    Allergy     Alpha-1-antitrypsin deficiency 7/25/2022    Anxiety     Depression     Hyperlipidemia     Melanoma 1/26/2015    scalp excised by Dr Aldridge    Melanoma of scalp 1/12/2015    Open wound of scalp, complicated 2/6/2015    Pre-diabetes 7/25/2022    Thickened endometrium 06/26/2020    7 mm endometrial stripe.  This would be slightly thickened for a  postmenopausal patient.  Further evaluation as clinically indicated.    Uterine fibroid 06/2020    1.1cm Small uterine fibroid identified.     Past Surgical History:   Procedure Laterality Date    APPENDECTOMY      CHOLECYSTECTOMY      COLONOSCOPY N/A 06/02/2020    Procedure: COLONOSCOPY;  Surgeon: Quan Morton MD;  [facility]  Service: Endoscopy;  Laterality: N/A;  5/13 - pt aware if drop off location and visitor policy -   COVID screening scheduled for 6/1/20 at [facility] -     ESOPHAGOGASTRODUODENOSCOPY N/A 06/02/2020    Procedure: EGD (ESOPHAGOGASTRODUODENOSCOPY);  Surgeon: Quan Morton MD;  Location: [facility] ([address] [place] [address]);  Service: Endoscopy;  Laterality: N/A;    EYE SURGERY      GALLBLADDER SURGERY  02/2017    Scalp Reconstruction      yin-yang flaps    SKIN SURGERY      removal of cancer    TONSILLECTOMY      WLE scalp melanoma  01/26/2015     Social History     Tobacco Use    Smoking status: Never    Smokeless tobacco: Never   Substance Use Topics    Alcohol use: Yes     Alcohol/week: 1.0 standard drink     Types: 1 Glasses of wine per week     Comment: ~1 bottle of wine nightly x years, stopped 7/2022    Drug use: Yes     Types: Marijuana     Comment: medical thc gummy         Review of Systems   Constitutional: Negative.    HENT: Negative.     Respiratory: Negative.     Cardiovascular: Negative.    Gastrointestinal: Negative.    Endocrine: Negative.    Genitourinary: Negative.    Musculoskeletal: Negative.    Psychiatric/Behavioral: Negative.     All other systems reviewed and are negative.  Objective:     Physical Exam  Vitals and nursing note reviewed.   Constitutional:       Appearance: She is well-developed. She is obese.   HENT:      Head: Normocephalic.   Eyes:      Conjunctiva/sclera: Conjunctivae normal.      Pupils: Pupils are equal, round, and reactive to light.   Cardiovascular:      Rate and Rhythm: Normal rate and regular rhythm.      Heart sounds: Normal  heart sounds.   Pulmonary:      Effort: Pulmonary effort is normal.      Breath sounds: Normal breath sounds.   Musculoskeletal:         General: Normal range of motion.      Cervical back: Normal range of motion and neck supple.   Skin:     General: Skin is warm and dry.   Neurological:      Mental Status: She is alert and oriented to person, place, and time.      Deep Tendon Reflexes: Reflexes are normal and symmetric.   Psychiatric:         Behavior: Behavior normal.         Thought Content: Thought content normal.         Judgment: Judgment normal.           Assessment:     1. Pre-diabetes      Plan:        Medication List            Accurate as of February 20, 2023 11:59 PM. If you have any questions, ask your nurse or doctor.                CONTINUE taking these medications      albuterol 90 mcg/actuation inhaler  Commonly known as: PROVENTIL/VENTOLIN HFA  USE 2 INHALATIONS EVERY 6 HOURS AS NEEDED FOR WHEEZING     BODY, HAIR, SKIN AND NAILS ORAL     calcium carbonate 600 mg calcium (1,500 mg) Tab  Commonly known as: OS-ADAMA     citalopram 10 MG tablet  Commonly known as: CeleXA  Take 1 tablet (10 mg total) by mouth every evening.     esomeprazole 20 MG capsule  Commonly known as: NEXIUM  Take 1 capsule (20 mg total) by mouth before breakfast.     fexofenadine 180 MG tablet  Commonly known as: ALLEGRA     fish oil-omega-3 fatty acids 300-1,000 mg capsule     fluticasone propionate 50 mcg/actuation nasal spray  Commonly known as: FLONASE  2 sprays (100 mcg total) by Each Nostril route daily as needed.     ketoconazole 2 % shampoo  Commonly known as: NIZORAL  Wash hair with medicated shampoo at least 2x/week - let sit on scalp at least 5 minutes prior to rinsing     multivit-iron-min-folic acid 3,500-18-0.4 unit-mg-mg Chew     psyllium powder  Commonly known as: METAMUCIL     rosuvastatin 20 MG tablet  Commonly known as: CRESTOR  Take 1 tablet (20 mg total) by mouth every evening.     tiZANidine 4 MG  tablet  Commonly known as: ZANAFLEX  Take 1 tablet (4 mg total) by mouth every 8 (eight) hours.     traZODone 50 MG tablet  Commonly known as: DESYREL     triamcinolone acetonide 0.025% 0.025 % cream  Commonly known as: KENALOG  Apply topically 2 (two) times daily. To affected areas on face x 1-2 wks then prn flares only            Pre-diabetes  -     Hemoglobin A1C; Future    Other orders  -     triamcinolone acetonide injection 40 mg

## 2023-02-21 ENCOUNTER — TELEPHONE (OUTPATIENT)
Dept: FAMILY MEDICINE | Facility: CLINIC | Age: 53
End: 2023-02-21
Payer: COMMERCIAL

## 2023-02-22 PROCEDURE — 96372 THER/PROPH/DIAG INJ SC/IM: CPT | Mod: S$GLB,,, | Performed by: FAMILY MEDICINE

## 2023-02-22 PROCEDURE — 96372 PR INJECTION,THERAP/PROPH/DIAG2ST, IM OR SUBCUT: ICD-10-PCS | Mod: S$GLB,,, | Performed by: FAMILY MEDICINE

## 2023-02-22 RX ADMIN — TRIAMCINOLONE ACETONIDE 40 MG: 40 INJECTION, SUSPENSION INTRA-ARTICULAR; INTRAMUSCULAR at 04:02

## 2023-02-23 ENCOUNTER — TELEPHONE (OUTPATIENT)
Dept: OBSTETRICS AND GYNECOLOGY | Facility: CLINIC | Age: 53
End: 2023-02-23
Payer: COMMERCIAL

## 2023-02-27 PROBLEM — E66.01 SEVERE OBESITY (BMI 35.0-39.9) WITH COMORBIDITY: Status: ACTIVE | Noted: 2023-02-27

## 2023-02-28 ENCOUNTER — PATIENT MESSAGE (OUTPATIENT)
Dept: OBSTETRICS AND GYNECOLOGY | Facility: CLINIC | Age: 53
End: 2023-02-28
Payer: COMMERCIAL

## 2023-02-28 ENCOUNTER — TELEPHONE (OUTPATIENT)
Dept: OBSTETRICS AND GYNECOLOGY | Facility: CLINIC | Age: 53
End: 2023-02-28
Payer: COMMERCIAL

## 2023-02-28 NOTE — TELEPHONE ENCOUNTER
----- Message from Lexie Rasmussen MA sent at 2/8/2023  4:50 PM CST -----  [facility] [place] Staff  Caller: Unspecified (Today,  9:22 AM)       Previous Messages  Surgery Scheduling (D&C Hysteroscopy 3/3/23)  (Newest Message First)  John Ortiz MA routed conversation to Gabriela BOOKER Staff 2 hours ago (2:12 PM)    John Ortiz MA routed conversation to Gabriela Green NP 2 hours ago (2:09 PM)    MD John Kendall MA 2 hours ago (2:00 PM)    D&C with hysteroscopy is an outpatient procedure in the OR.   I generally operate at [facility] but could potentially do it a [facility].   This would need to be arranged with GI (or whoever is performing liver biopsy).

## 2023-02-28 NOTE — TELEPHONE ENCOUNTER
2/28/23 I left another message in addition to the message left on 2/23/23 and message sent through patient portal to have the patient call the office. I am trying to schedule her surgery and coordinate with liver biopsy. Patient read her PP message but has not responded to schedule the surgery date. 3/3/23 was offered but the patient has not called back to confirm or schedule pre-op appointments.

## 2023-03-15 ENCOUNTER — LAB VISIT (OUTPATIENT)
Dept: LAB | Facility: HOSPITAL | Age: 53
End: 2023-03-15
Payer: COMMERCIAL

## 2023-03-15 ENCOUNTER — TELEPHONE (OUTPATIENT)
Dept: HEPATOLOGY | Facility: CLINIC | Age: 53
End: 2023-03-15

## 2023-03-15 ENCOUNTER — OFFICE VISIT (OUTPATIENT)
Dept: HEPATOLOGY | Facility: CLINIC | Age: 53
End: 2023-03-15
Payer: COMMERCIAL

## 2023-03-15 VITALS — WEIGHT: 183 LBS | BODY MASS INDEX: 33.68 KG/M2 | HEIGHT: 62 IN

## 2023-03-15 DIAGNOSIS — K76.0 FATTY LIVER: ICD-10-CM

## 2023-03-15 DIAGNOSIS — E66.9 OBESITY (BMI 30.0-34.9): ICD-10-CM

## 2023-03-15 DIAGNOSIS — K74.02 HEPATIC FIBROSIS, STAGE 3: ICD-10-CM

## 2023-03-15 DIAGNOSIS — K76.0 FATTY LIVER: Primary | ICD-10-CM

## 2023-03-15 DIAGNOSIS — E78.5 HYPERLIPIDEMIA, UNSPECIFIED HYPERLIPIDEMIA TYPE: ICD-10-CM

## 2023-03-15 DIAGNOSIS — E88.01 ALPHA-1-ANTITRYPSIN DEFICIENCY: ICD-10-CM

## 2023-03-15 DIAGNOSIS — R73.03 PRE-DIABETES: ICD-10-CM

## 2023-03-15 PROBLEM — R74.8 ELEVATED LIVER ENZYMES: Status: RESOLVED | Noted: 2022-07-25 | Resolved: 2023-03-15

## 2023-03-15 PROBLEM — E66.811 OBESITY (BMI 30.0-34.9): Status: ACTIVE | Noted: 2023-02-27

## 2023-03-15 LAB
ALBUMIN SERPL BCP-MCNC: 4.3 G/DL (ref 3.5–5.2)
ALP SERPL-CCNC: 120 U/L (ref 55–135)
ALT SERPL W/O P-5'-P-CCNC: 44 U/L (ref 10–44)
ANION GAP SERPL CALC-SCNC: 7 MMOL/L (ref 8–16)
AST SERPL-CCNC: 33 U/L (ref 10–40)
BILIRUB SERPL-MCNC: 0.3 MG/DL (ref 0.1–1)
BUN SERPL-MCNC: 8 MG/DL (ref 6–20)
CALCIUM SERPL-MCNC: 10.4 MG/DL (ref 8.7–10.5)
CHLORIDE SERPL-SCNC: 102 MMOL/L (ref 95–110)
CO2 SERPL-SCNC: 30 MMOL/L (ref 23–29)
CREAT SERPL-MCNC: 0.7 MG/DL (ref 0.5–1.4)
ERYTHROCYTE [DISTWIDTH] IN BLOOD BY AUTOMATED COUNT: 13.5 % (ref 11.5–14.5)
EST. GFR  (NO RACE VARIABLE): >60 ML/MIN/1.73 M^2
GLUCOSE SERPL-MCNC: 94 MG/DL (ref 70–110)
HCT VFR BLD AUTO: 43 % (ref 37–48.5)
HGB BLD-MCNC: 13.4 G/DL (ref 12–16)
INR PPP: 1 (ref 0.8–1.2)
MCH RBC QN AUTO: 27.9 PG (ref 27–31)
MCHC RBC AUTO-ENTMCNC: 31.2 G/DL (ref 32–36)
MCV RBC AUTO: 90 FL (ref 82–98)
PLATELET # BLD AUTO: 361 K/UL (ref 150–450)
PMV BLD AUTO: 9.8 FL (ref 9.2–12.9)
POTASSIUM SERPL-SCNC: 4.4 MMOL/L (ref 3.5–5.1)
PROT SERPL-MCNC: 8.2 G/DL (ref 6–8.4)
PROTHROMBIN TIME: 10.6 SEC (ref 9–12.5)
RBC # BLD AUTO: 4.8 M/UL (ref 4–5.4)
SODIUM SERPL-SCNC: 139 MMOL/L (ref 136–145)
WBC # BLD AUTO: 10.02 K/UL (ref 3.9–12.7)

## 2023-03-15 PROCEDURE — 1160F RVW MEDS BY RX/DR IN RCRD: CPT | Mod: CPTII,S$GLB,, | Performed by: NURSE PRACTITIONER

## 2023-03-15 PROCEDURE — 80053 COMPREHEN METABOLIC PANEL: CPT | Performed by: NURSE PRACTITIONER

## 2023-03-15 PROCEDURE — 1159F MED LIST DOCD IN RCRD: CPT | Mod: CPTII,S$GLB,, | Performed by: NURSE PRACTITIONER

## 2023-03-15 PROCEDURE — 99215 PR OFFICE/OUTPT VISIT, EST, LEVL V, 40-54 MIN: ICD-10-PCS | Mod: S$GLB,,, | Performed by: NURSE PRACTITIONER

## 2023-03-15 PROCEDURE — 99999 PR PBB SHADOW E&M-EST. PATIENT-LVL IV: ICD-10-PCS | Mod: PBBFAC,,, | Performed by: NURSE PRACTITIONER

## 2023-03-15 PROCEDURE — 85610 PROTHROMBIN TIME: CPT | Performed by: NURSE PRACTITIONER

## 2023-03-15 PROCEDURE — 99999 PR PBB SHADOW E&M-EST. PATIENT-LVL IV: CPT | Mod: PBBFAC,,, | Performed by: NURSE PRACTITIONER

## 2023-03-15 PROCEDURE — 3008F BODY MASS INDEX DOCD: CPT | Mod: CPTII,S$GLB,, | Performed by: NURSE PRACTITIONER

## 2023-03-15 PROCEDURE — 36415 COLL VENOUS BLD VENIPUNCTURE: CPT | Performed by: NURSE PRACTITIONER

## 2023-03-15 PROCEDURE — 1160F PR REVIEW ALL MEDS BY PRESCRIBER/CLIN PHARMACIST DOCUMENTED: ICD-10-PCS | Mod: CPTII,S$GLB,, | Performed by: NURSE PRACTITIONER

## 2023-03-15 PROCEDURE — 99215 OFFICE O/P EST HI 40 MIN: CPT | Mod: S$GLB,,, | Performed by: NURSE PRACTITIONER

## 2023-03-15 PROCEDURE — 85027 COMPLETE CBC AUTOMATED: CPT | Performed by: NURSE PRACTITIONER

## 2023-03-15 PROCEDURE — 3008F PR BODY MASS INDEX (BMI) DOCUMENTED: ICD-10-PCS | Mod: CPTII,S$GLB,, | Performed by: NURSE PRACTITIONER

## 2023-03-15 PROCEDURE — 1159F PR MEDICATION LIST DOCUMENTED IN MEDICAL RECORD: ICD-10-PCS | Mod: CPTII,S$GLB,, | Performed by: NURSE PRACTITIONER

## 2023-03-15 NOTE — PATIENT INSTRUCTIONS
In a nutshell, a liver biopsy is a same day procedure. Someone needs to bring you, stay with you, and bring you home because they give you medication to make you sleepy for the procedure. They give you sedation to make you sleepy but do not put you fully to sleep. They numb the right upper part of the abdomen where the liver is and pass a thin needle through the skin into the liver to obtain a piece of liver tissue that can be evaluated under a microscope by a pathologist.     They use an ultrasound to guide doing the biopsy. Possible complications associated with liver biopsy include pain, bleeding, infection, and organ perforation - although not common and risk is low.     They keep you for 4 hours after the biopsy to assure that you are stable to return home. It is a same day procedure.     I am recommending the biopsy to confirm the diagnosis and staging of liver disease so pt can be appropriately followed from this point forward.    Do NOT take any aspirin, NSAIDS (including advil, aleve, ibuprofen, motrin, naproxen) and fish oil for 7 days before and after biopsy

## 2023-03-15 NOTE — TELEPHONE ENCOUNTER
Saw pt in clinic today, pt was nervous about OB procedure + liver biopsy so she was avoiding/delaying scheduling any procedures    Re-discussed liver biopsy procedure and possible complications associated with liver biopsy including pain, bleeding, infection, and organ perforation. Reviewed the role of the procedure including confirming of diagnosis and staging of liver disease so pt can be appropriately followed from this point forward.    Instructed pt to not take any aspirin, NSAIDS (including advil, aleve, ibuprofen, motrin, naproxen) and fish oil for 7 days before and after biopsy      Message forwarded to previous team who was arranging doing OB procedure and liver biopsy together to arrange

## 2023-03-15 NOTE — PROGRESS NOTES
[facility] VISIT FOLLOW UP NOTE    PCP: Carlos Santiago MD     CHIEF COMPLAINT: hepatomegaly, fatty liver, alpha 1 antitrypsin deficiency, elevated liver enzymes, pre-cirrhosis (?)    HPI: This is a 52 y.o. White female with PMH noted below, presenting for follow up of above     Serological workup was negative for Geovanni's, hemochromatosis, autoimmune etiology, and viral hepatitis A, B and C.   alpha-1 antitrypsin deficiency - SS, level 90, needs pulm, reminded pt   PETH 150s     Prior serologic workup:   Lab Results   Component Value Date    SMOOTHMUSCAB Negative 1:40 07/25/2022    AMAIFA Negative 1:40 07/25/2022    IGGSERUM 1166 07/25/2022    ANASCREEN Negative <1:80 07/25/2022    FERRITIN 247 07/25/2022    FESATURATED 11 (L) 07/25/2022    [id number] SEE BELOW 07/27/2022    HJQBL1NMPARX 90 (L) 07/27/2022    CERULOPLSM 36.0 07/25/2022    HEPBSAG Negative 07/25/2022    HEPCAB Negative 07/25/2022    HEPAIGM Negative 06/07/2018     Risk factors for fatty liver include previous alcohol use, obesity, HLD, preDM    Liver fibrosis staging:  -- fibroscan 8/2022 noted F3, S3 (kPA 12.7, )    Interval HPI: Presents today with significant other. Stopped alcohol 7/2022, previously drinking heavily (~1 bottle of wine nightly). Also following WW. Had organized her liver biopsy to be done by IR at the same time as her OB procedure but pt did not return calls to OB to get it scheduled, no biopsy done yet  Fibroscan suggests pre-cirrhosis  Also alpha 1 antitrypsin, no lung s/s currently - has not gone to pulm yet, reminded to schedule appt   No SSB    Labs done 2/2023 show normal transaminase levels (ALT = AST, elevated since 2017)  Platelets WNL, alk phos WNL  Synthetic liver functioning WNL    Lab Results   Component Value Date    ALT 38 02/16/2023    AST 39 02/16/2023    ALKPHOS 139 (H) 02/16/2023    BILITOT 0.5 02/16/2023    ALBUMIN 5.4 (H) 02/16/2023    INR 1.1 12/20/2017     02/16/2023     Abd  "U/S done 7/2022 showed hepatomegaly, fatty liver, focal fatty sparing, no splenomegaly    Denies family history of liver disease . + previously heavy Alcohol consumption, see below  Social History     Substance and Sexual Activity   Alcohol Use Yes    Alcohol/week: 1.0 standard drink    Types: 1 Glasses of wine per week    Comment: ~1 bottle of wine nightly x years, stopped 7/2022       Immunity to Hep A and B - needs [employer]ix, will discuss at next visit          Allergy and medication list reviewed and updated     PMHX:  has a past medical history of Allergy, Alpha-1-antitrypsin deficiency (7/25/2022), Anxiety, Depression, Hyperlipidemia, Melanoma (1/26/2015), Melanoma of scalp (1/12/2015), Open wound of scalp, complicated (2/6/2015), Pre-diabetes (7/25/2022), Thickened endometrium (06/26/2020), and Uterine fibroid (06/2020).    PSHX:  has a past surgical history that includes Skin surgery; Appendectomy; Tonsillectomy; Eye surgery; WLE scalp melanoma (01/26/2015); Gallbladder surgery (02/2017); Scalp Reconstruction; Esophagogastroduodenoscopy (N/A, 06/02/2020); Colonoscopy (N/A, 06/02/2020); and Cholecystectomy.    FAMILY HISTORY: Updated and reviewed in [facility]    SOCIAL HISTORY:   Social History     Substance and Sexual Activity   Alcohol Use Yes    Alcohol/week: 1.0 standard drink    Types: 1 Glasses of wine per week    Comment: ~1 bottle of wine nightly x years, stopped 7/2022       Social History     Substance and Sexual Activity   Drug Use Yes    Types: Marijuana    Comment: medical thc gummy       ROS:   GENERAL: Denies fatigue  CARDIOVASCULAR: Denies edema  GI: Denies abdominal pain  SKIN: Denies rash, itching   NEURO: Denies confusion, memory loss, or mood changes    PHYSICAL EXAM:   Friendly White female, in no acute distress; alert and oriented to person, place and time  VITALS: Ht 5' 1.5" (1.562 m)   Wt 83 kg (182 lb 15.7 oz)   LMP 10/12/2018 (Approximate)   BMI 34.01 kg/m²   EYES: Sclerae anicteric  GI: " Soft, non-tender, non-distended. No ascites.  EXTREMITIES:  No edema.  SKIN: Warm and dry. No jaundice. No telangectasias noted. No palmar erythema.  NEURO:  No asterixis.  PSYCH:  Thought and speech pattern appropriate. Behavior normal      EDUCATION:  See instructions discussed with patient in Instructions section of the After Visit Summary     ASSESSMENT & PLAN:  52 y.o. White female with:  1.  Pre-cirrhosis (?)  -- no splenomegaly or thrombocytopenia, needs further eval  Discussed options for further evaluation/confirmation of fibrosis/cirrhosis includin. Liver biopsy OR  2. MRI elastography  3. Assume fibroscan is correct that you have cirrhosis and follow you every 6 months for life     Discussed liver biopsy procedure and possible complications associated with liver biopsy including pain, bleeding, infection, and organ perforation. Reviewed the role of the procedure including confirming of diagnosis and staging of liver disease so pt can be appropriately followed from this point forward.  Discussed implications of a cirrhosis diagnosis with HCC screenings and EGD and why it is important for us to determine if pt's have cirrhosis so they can be properly monitored for potential complications.     Pt will proceed with liver biopsy at the same time as her OB procedure, team messaged to arrange     2. Fatty liver, likely related to alcohol + metabolic risk factors   - see HPI  -- Immunity to Hep A and B : needs [employer]ix, will discuss at next visit   -- Recommendations discussed with patient:  Limit alcohol consumption, none given possible pre-cirrhosis   2 Weight loss goal of 20 lbs  3. Low carb/sugar, high fiber and protein diet, limit your carb intake to LESS than 30-45 grams of carbs with a meal or LESS than 5-10 grams with any snack   4. Exercise, 5 days per week, 30 minutes per day, as tolerated  5. Recommend good cholesterol, blood pressure, blood sugar levels     3. Alpha 1 antitrypsin deficiency   --  referral to pulm, given pt phone number to call and schedule appt     4. Obesity, HLD, preDM   -- Body mass index is 34.01 kg/m².   -- increases risk for fatty liver            Follow up in about 2 weeks (around 3/29/2023) for after liver biopsy completed.   Orders Placed This Encounter   Procedures    Comprehensive Metabolic Panel    CBC Without Differential    Protime-INR        Thank you for allowing me to participate in the care of Isela Graf    BABAR Rain    I spent a total of 45 minutes on the day of the visit.This includes face to face time and non-face to face time preparing to see the patient (eg, review of tests), obtaining and/or reviewing separately obtained history, documenting clinical information in the electronic or other health record, independently interpreting results and communicating results to the patient/family/caregiver, and coordinating care.         CC'ed note to:   MD Lexie Lemos, Philip Ochoa, Winter Ruff, Samantha Baker, Polo Alvarado

## 2023-03-17 ENCOUNTER — TELEPHONE (OUTPATIENT)
Dept: OBSTETRICS AND GYNECOLOGY | Facility: CLINIC | Age: 53
End: 2023-03-17
Payer: COMMERCIAL

## 2023-03-17 ENCOUNTER — PATIENT MESSAGE (OUTPATIENT)
Dept: HEPATOLOGY | Facility: CLINIC | Age: 53
End: 2023-03-17
Payer: COMMERCIAL

## 2023-03-17 DIAGNOSIS — N95.0 POSTMENOPAUSAL BLEEDING: Primary | ICD-10-CM

## 2023-03-17 DIAGNOSIS — R93.89 THICKENED ENDOMETRIUM: ICD-10-CM

## 2023-03-17 NOTE — TELEPHONE ENCOUNTER
----- Message from Bassem Thornton sent at 3/17/2023  1:31 PM CDT -----  AYANNA Owen RETURNING YOUR CALL [phone]

## 2023-03-17 NOTE — TELEPHONE ENCOUNTER
LANDON Gomez NP; Lexie Rasmussen MA  Cc: Philip Ochoa MD; Winter Ruff, RN; Samantha Baker, RN; Polo Alvarado MD; P [facility] Interventional Radiology Schedulers  Caller: Unspecified (2 days ago,  4:01 PM)  For all request for IR please enter ([id number]) Ambulatory consult/referral to Interventional RAD. This referral goes directly to providers to review and approve procedures.     Thanks,     Juanita           Previous Messages     ----- Message -----   From: Gabriela Green NP   Sent: 3/15/2023   4:04 PM CDT   To: Philip Ochoa MD, Winter Ruff, RN, *     ----- Message from Gabriela Green NP sent at 3/15/2023  4:04 PM CDT -----   Pt is now ready to schedule OB procedure + liver biospy if still possible. Can someone update me after she picks a date so that I can arrange our follow up visit? Thanks in advance!      Patient Calls  (Newest Message First)  You Just now (1:16 PM)     DI  [facility] [place] to offer the patient 4/7/23 for her GYN surgery         Note      Myesha Stroud MA  You; Gabriela Green NP; Philip Ochoa MD; Winter Ruff, JESUS; Samantha Baker, RN; Polo Alvarado MD; [facility] Interventional Radiology Schedulers 1 hour ago (11:58 AM)       For all request for IR please enter ([id number]) Ambulatory consult/referral to Interventional RAD. This referral goes directly to providers to review and approve procedures.     Thanks,     Juanita      You routed conversation to Philip Ochoa MD 21 hours ago (3:47 PM)     Gabriela Green NP  You; Philip Ochoa MD; Winter Ruff, RN; Samantha Baker, RN; Polo Alvarado MD; [facility] Interventional Radiology Schedulers 2 days ago       Pt is now ready to schedule OB procedure + liver biospy if still possible. Can someone update me after she picks a date so that I can arrange our follow up visit? Thanks in advance!      Gabriela Green NP 2 days ago       Saw pt in clinic today, pt was nervous about  OB procedure + liver biopsy so she was avoiding/delaying scheduling any procedures     Re-discussed liver biopsy procedure and possible complications associated with liver biopsy including pain, bleeding, infection, and organ perforation. Reviewed the role of the procedure including confirming of diagnosis and staging of liver disease so pt can be appropriately followed from this point forward.     Instructed pt to not take any aspirin, NSAIDS (including advil, aleve, ibuprofen, motrin, naproxen) and fish oil for 7 days before and after biopsy       Message forwarded to previous team who was arranging doing OB procedure and liver biopsy together to arrange                Note            Recent Patient Communication     Last Update Reason Specialty     Today -- Hepatology     [facility] Hepatology Gabriela Green Closed     6 days ago -- Obstetrics and Gynecology     [facility] Obstetrics And Gynecology [address] Mychart, Generic Provider Closed     2 weeks ago -- Obstetrics and Gynecology     [facility] Obstetrics [facility] Gynecology [address] Philip Ochoa Closed     3 weeks ago -- Obstetrics and Gynecology     [facility] Obstetrics [facility] Gynecology [address] Philip Ochoa Closed     3 weeks ago -- Family Medicine     [facility] Family Medicine Carlos Santiago Closed       There are additional recent communications with this patient. View the rest in Chart Review.

## 2023-03-24 ENCOUNTER — TELEPHONE (OUTPATIENT)
Dept: HEPATOLOGY | Facility: CLINIC | Age: 53
End: 2023-03-24
Payer: COMMERCIAL

## 2023-03-24 ENCOUNTER — PATIENT MESSAGE (OUTPATIENT)
Dept: HEPATOLOGY | Facility: CLINIC | Age: 53
End: 2023-03-24
Payer: COMMERCIAL

## 2023-03-24 NOTE — TELEPHONE ENCOUNTER
Attempted to call pt to schedule hepatology f/u visit. No answer. Limited appt times available for time frame needed so scheduled appt and messaged patient in [facility] to contact us if anything needs to be changed

## 2023-03-30 ENCOUNTER — ANESTHESIA EVENT (OUTPATIENT)
Dept: SURGERY | Facility: OTHER | Age: 53
End: 2023-03-30
Payer: COMMERCIAL

## 2023-03-30 ENCOUNTER — OFFICE VISIT (OUTPATIENT)
Dept: OBSTETRICS AND GYNECOLOGY | Facility: CLINIC | Age: 53
End: 2023-03-30
Attending: OBSTETRICS & GYNECOLOGY
Payer: COMMERCIAL

## 2023-03-30 ENCOUNTER — PATIENT MESSAGE (OUTPATIENT)
Dept: HEPATOLOGY | Facility: CLINIC | Age: 53
End: 2023-03-30
Payer: COMMERCIAL

## 2023-03-30 ENCOUNTER — HOSPITAL ENCOUNTER (OUTPATIENT)
Dept: PREADMISSION TESTING | Facility: OTHER | Age: 53
Discharge: HOME | End: 2023-03-30
Attending: OBSTETRICS & GYNECOLOGY
Payer: COMMERCIAL

## 2023-03-30 VITALS
DIASTOLIC BLOOD PRESSURE: 68 MMHG | WEIGHT: 181.19 LBS | HEIGHT: 62 IN | BODY MASS INDEX: 33.34 KG/M2 | SYSTOLIC BLOOD PRESSURE: 110 MMHG

## 2023-03-30 VITALS
DIASTOLIC BLOOD PRESSURE: 70 MMHG | RESPIRATION RATE: 16 BRPM | BODY MASS INDEX: 34.17 KG/M2 | HEIGHT: 61 IN | OXYGEN SATURATION: 98 % | TEMPERATURE: 98 F | WEIGHT: 181 LBS | HEART RATE: 85 BPM | SYSTOLIC BLOOD PRESSURE: 119 MMHG

## 2023-03-30 DIAGNOSIS — R93.89 THICKENED ENDOMETRIUM: ICD-10-CM

## 2023-03-30 DIAGNOSIS — N95.0 POSTMENOPAUSAL BLEEDING: Primary | ICD-10-CM

## 2023-03-30 PROCEDURE — 3074F PR MOST RECENT SYSTOLIC BLOOD PRESSURE < 130 MM HG: ICD-10-PCS | Mod: CPTII,S$GLB,, | Performed by: OBSTETRICS & GYNECOLOGY

## 2023-03-30 PROCEDURE — 99999 PR PBB SHADOW E&M-EST. PATIENT-LVL III: ICD-10-PCS | Mod: PBBFAC,,, | Performed by: OBSTETRICS & GYNECOLOGY

## 2023-03-30 PROCEDURE — 3078F PR MOST RECENT DIASTOLIC BLOOD PRESSURE < 80 MM HG: ICD-10-PCS | Mod: CPTII,S$GLB,, | Performed by: OBSTETRICS & GYNECOLOGY

## 2023-03-30 PROCEDURE — 1159F PR MEDICATION LIST DOCUMENTED IN MEDICAL RECORD: ICD-10-PCS | Mod: CPTII,S$GLB,, | Performed by: OBSTETRICS & GYNECOLOGY

## 2023-03-30 PROCEDURE — 99999 PR PBB SHADOW E&M-EST. PATIENT-LVL III: CPT | Mod: PBBFAC,,, | Performed by: OBSTETRICS & GYNECOLOGY

## 2023-03-30 PROCEDURE — 1159F MED LIST DOCD IN RCRD: CPT | Mod: CPTII,S$GLB,, | Performed by: OBSTETRICS & GYNECOLOGY

## 2023-03-30 PROCEDURE — 99499 NO LOS: ICD-10-PCS | Mod: S$GLB,,, | Performed by: OBSTETRICS & GYNECOLOGY

## 2023-03-30 PROCEDURE — 1160F RVW MEDS BY RX/DR IN RCRD: CPT | Mod: CPTII,S$GLB,, | Performed by: OBSTETRICS & GYNECOLOGY

## 2023-03-30 PROCEDURE — 3078F DIAST BP <80 MM HG: CPT | Mod: CPTII,S$GLB,, | Performed by: OBSTETRICS & GYNECOLOGY

## 2023-03-30 PROCEDURE — 99499 UNLISTED E&M SERVICE: CPT | Mod: S$GLB,,, | Performed by: OBSTETRICS & GYNECOLOGY

## 2023-03-30 PROCEDURE — 3008F PR BODY MASS INDEX (BMI) DOCUMENTED: ICD-10-PCS | Mod: CPTII,S$GLB,, | Performed by: OBSTETRICS & GYNECOLOGY

## 2023-03-30 PROCEDURE — 3074F SYST BP LT 130 MM HG: CPT | Mod: CPTII,S$GLB,, | Performed by: OBSTETRICS & GYNECOLOGY

## 2023-03-30 PROCEDURE — 1160F PR REVIEW ALL MEDS BY PRESCRIBER/CLIN PHARMACIST DOCUMENTED: ICD-10-PCS | Mod: CPTII,S$GLB,, | Performed by: OBSTETRICS & GYNECOLOGY

## 2023-03-30 PROCEDURE — 3008F BODY MASS INDEX DOCD: CPT | Mod: CPTII,S$GLB,, | Performed by: OBSTETRICS & GYNECOLOGY

## 2023-03-30 RX ORDER — ACETAMINOPHEN 500 MG
1000 TABLET ORAL
Status: CANCELLED | OUTPATIENT
Start: 2023-03-30 | End: 2023-03-30

## 2023-03-30 RX ORDER — LIDOCAINE HYDROCHLORIDE 10 MG/ML
0.5 INJECTION, SOLUTION EPIDURAL; INFILTRATION; INTRACAUDAL; PERINEURAL ONCE
Status: CANCELLED | OUTPATIENT
Start: 2023-03-30 | End: 2023-03-30

## 2023-03-30 RX ORDER — ALBUTEROL SULFATE 2.5 MG/.5ML
2.5 SOLUTION RESPIRATORY (INHALATION)
Status: CANCELLED | OUTPATIENT
Start: 2023-03-30 | End: 2023-03-30

## 2023-03-30 RX ORDER — DEXTROSE, SODIUM CHLORIDE, SODIUM LACTATE, POTASSIUM CHLORIDE, AND CALCIUM CHLORIDE 5; .6; .31; .03; .02 G/100ML; G/100ML; G/100ML; G/100ML; G/100ML
INJECTION, SOLUTION INTRAVENOUS CONTINUOUS
Status: CANCELLED | OUTPATIENT
Start: 2023-03-30

## 2023-03-30 RX ORDER — POLYETHYLENE GLYCOL 3350 17 G/17G
17 POWDER, FOR SOLUTION ORAL DAILY PRN
COMMUNITY
End: 2024-02-06

## 2023-03-30 RX ORDER — SODIUM CHLORIDE, SODIUM LACTATE, POTASSIUM CHLORIDE, CALCIUM CHLORIDE 600; 310; 30; 20 MG/100ML; MG/100ML; MG/100ML; MG/100ML
INJECTION, SOLUTION INTRAVENOUS CONTINUOUS
Status: CANCELLED | OUTPATIENT
Start: 2023-03-30

## 2023-03-30 NOTE — DISCHARGE INSTRUCTIONS
Information to Prepare you for your Surgery    PRE-ADMIT TESTING -  [phone]    [address] [facility] [place] [facility]          Your surgery has been scheduled at [facility]. We are pleased to have the opportunity to serve you. For Further Information please call [phone].    On the day of surgery please report to the Information Desk on the 1st floor.    CONTACT YOUR PHYSICIAN'S OFFICE THE DAY PRIOR TO YOUR SURGERY TO OBTAIN YOUR ARRIVAL TIME.     The evening before surgery do not eat anything after 9 p.m. ( this includes hard candy, chewing gum and mints).  You may only have GATORADE, POWERADE AND WATER  from 9 p.m. until you leave your home.   DO NOT DRINK ANY LIQUIDS ON THE WAY TO THE HOSPITAL.      Why does your anesthesiologist allow you to drink Gatorade/Powerade before surgery?  Gatorade/Powerade helps to increase your comfort before surgery and to decrease your nausea after surgery. The carbohydrates in Gatorade/Powerade help reduce your body's stress response to surgery.  If you are a diabetic-drink only water prior to surgery.      Current Visitor policy(12/27/2021) - Patients may have 2 visitors pre and post procedure. Only 2 visitors will be allowed in the Surgical building with the patient. No one under the age of 12 will be allowed into the facility.    SPECIAL MEDICATION INSTRUCTIONS: TAKE medications checked off by the Anesthesiologist on your Medication List.    Angiogram Patients: Take medications as instructed by your physician, including aspirin.     Surgery Patients:    If you take ASPIRIN - Your PHYSICIAN/SURGEON will need to inform you IF/OR when you need to stop taking aspirin prior to your surgery.     The week prior to surgery do not ot take any medications containing IBUPROFEN or NSAIDS ( Advil, Motrin, Goodys, BC, Aleve, Naproxen etc) If you are not sure if you should take a medicine please call your surgeon's office.  Ok to take  Tylenol    Do Not Wear any make-up (especially eye make-up) to surgery. Please remove any false eyelashes or eyelash extensions. If you arrive the day of surgery with makeup/eyelashes on you will be required to remove prior to surgery. (There is a risk of corneal abrasions if eye makeup/eyelash extensions are not removed)      Leave all valuables at home.   Do Not wear any jewelry or watches, including any metal in body piercings. Jewelry must be removed prior to coming to the hospital.  There is a possibility that rings that are unable to be removed may be cut off if they are on the surgical extremity.    Please remove all hair extensions, wigs, clips and any other metal accessories/ ornaments from your hair.  These items may pose a flammable/fire risk in Surgery and must be removed.    Do not shave your surgical area at least 5 days prior to your surgery. The surgical prep will be performed at the hospital according to Infection Control regulations.    Contact Lens must be removed before surgery. Either do not wear the contact lens or bring a case and solution for storage.  Please bring a container for eyeglasses or dentures as required.  Bring any paperwork your physician has provided, such as consent forms,  history and physicals, doctor's orders, etc.   Bring comfortable clothes that are loose fitting to wear upon discharge. Take into consideration the type of surgery being performed.  Maintain your diet as advised per your physician the day prior to surgery.      Adequate rest the night before surgery is advised.   Park in the Parking lot behind the hospital or in the [place] [facility] across the street from the parking lot. Parking is complimentary.  If you will be discharged the same day as your procedure, please arrange for a responsible adult to drive you home or to accompany you if traveling by taxi.   YOU WILL NOT BE PERMITTED TO DRIVE OR TO LEAVE THE HOSPITAL ALONE AFTER SURGERY.   If you are  being discharged the same day, it is strongly recommended that you arrange for someone to remain with you for the first 24 hrs following your surgery.    The Surgeon will speak to your family/visitor after your surgery regarding the outcome of your surgery and post op care.  The Surgeon may speak to you after your surgery, but there is a possibility you may not remember the details.  Please check with your family members regarding the conversation with the Surgeon.    We strongly recommend whoever is bringing you home be present for discharge instructions.  This will ensure a thorough understanding for your post op home care.    ALL CHILDREN MUST ALWAYS BE ACCOMPANIED BY AN ADULT.    Visitors-Refer to current Visitor policy handouts.    Thank you for your cooperation.  The Staff of [facility].            Bathing Instructions with Hibiclens    Shower the evening before and morning of your procedure with Chlorhexidine (Hibiclens)  do not use Chlorhexidine on your face or genitals. Do not get in your eyes.  Wash your face with water and your regular face wash/soap  Use your regular shampoo  Apply Chlorhexidine (Hibiclens) directly on your skin or on a wet washcloth and wash gently. When showering: Move away from the shower stream when applying Chlorhexidine (Hibiclens) to avoid rinsing off too soon.  Rinse thoroughly with warm water  Do not dilute Chlorhexidine (Hibiclens)   Dry off as usual, do not use any deodorant, powder, body lotions, perfume, after shave or cologne.

## 2023-03-30 NOTE — H&P (VIEW-ONLY)
C.C Postmenopausal bleeding with a thickened endometrium      HPI : Isela Graf is a 52 y.o. female   who presents for D&C, hysteroscopy for evaluation of postmenopausal bleeding with a thickened endometrium.   She has been menopausal for several years.  She noted light bleeding 10/2022 after IC which lasted for about one week.  Pelvic ultrasound showed a significantly thickened endometrium measuring 18 mm.  However, EMBX 12/15/22 did not reveal definitive endometrial tissue.  Prior evaluation for pelvic pain with pelvic ultrasound 21 showed a 8 mm endometrium.  EMBX 21 again did not reveal any endometrial tissue.    22 Pelvic sono:  FINDINGS:  Uterus:  Size: 4.2 x 4.6 x 7.9 cm  Masses: There is an anechoic area in the posterior fundus 3 x 4 x 6 mm.  Endometrium: Abnormal in this post menopausal patient, irregular in appearance with a somewhat focal area in the fundus measuring 1.6 x 1.8 x 1.8 cm.  Follow-up suggested.  Right ovary:  Size: 1.1 x 1.7 x 1.8 cm  Appearance: Normal  Vascular flow: Not assessed  Left ovary:  Not visualized.  Free Fluid:  None.  Impression:  Abnormal study.  Focal hyperechoic thickening of the endometrium within the fundus.  Follow-up suggested.  Limited assessment of the ovaries.    12/15/22 EMBX:  ENDOMETRIUM, BIOPSY:  (sounds 8 cm)  - Definitive endometrial tissue is not identified.   - Rare benign endocervical glands.   - Sample is suboptimal for evaluation of the process in question.     12/15/22 Pap: Negative, HPV: Negative        Past Medical History:   Diagnosis Date    Allergy     Alpha-1-antitrypsin deficiency 2022    Anxiety     Constipation     Depression     Hyperlipidemia     Melanoma 2015    scalp excised by Dr Aldridge    Melanoma of scalp 2015    Open wound of scalp, complicated 2015    Other seasonal allergic rhinitis     Pre-diabetes 2022    Thickened endometrium 2020    7 mm endometrial stripe.  This  would be slightly thickened for a postmenopausal patient.  Further evaluation as clinically indicated.    Uterine fibroid 2020    1.1cm Small uterine fibroid identified.     Past Surgical History:   Procedure Laterality Date    APPENDECTOMY      CHOLECYSTECTOMY      COLONOSCOPY N/A 2020    Procedure: COLONOSCOPY;  Surgeon: Quan Morton MD;  Location: [facility] ENDO (4TH FLR);  Service: Endoscopy;  Laterality: N/A;   - pt aware if drop off location and visitor policy -   COVID screening scheduled for 20 at Primary Care -     ESOPHAGOGASTRODUODENOSCOPY N/A 2020    Procedure: EGD (ESOPHAGOGASTRODUODENOSCOPY);  Surgeon: Quan Morton MD;  Location: [facility] ENDO (4TH FLR);  Service: Endoscopy;  Laterality: N/A;    EYE SURGERY      GALLBLADDER SURGERY  2017    Scalp Reconstruction      yin-yang flaps    SKIN SURGERY      removal of cancer    TONSILLECTOMY      WLE scalp melanoma  2015     Family History   Problem Relation Age of Onset    Hyperlipidemia Mother     Heart attack Mother     Hyperlipidemia Father     Cancer Father     Heart disease Father     Cancer Maternal Grandmother         BCC    Breast cancer Paternal Grandmother 50    Heart attack Paternal Grandmother     Heart disease Paternal Grandmother     Melanoma Neg Hx     Colon cancer Neg Hx     Ovarian cancer Neg Hx     Cirrhosis Neg Hx      Social History     Tobacco Use    Smoking status: Never    Smokeless tobacco: Never   Substance Use Topics    Alcohol use: Yes     Alcohol/week: 4.0 standard drinks     Types: 4 Glasses of wine per week     Comment: ~1 bottle of wine nightly x years, stopped 2022    Drug use: Yes     Types: Marijuana     Comment: medical thc gummy -daily     OB History    Para Term  AB Living   7 2 2   3 2   SAB IAB Ectopic Multiple Live Births   3              # Outcome Date GA Lbr Kumar/2nd Weight Sex Delivery Anes PTL Lv   7 Term            6 Term            5       Vag-Spont     "  4       Vag-Spont      3 SAB            2 SAB            1 SAB                /68   Ht 5' 1.5" (1.562 m)   Wt 82.2 kg (181 lb 3.5 oz)   LMP 10/12/2018 (Approximate)   BMI 33.69 kg/m²     ROS:  GENERAL: Feeling well overall.   SKIN: Denies rash or lesions.   HEAD: Denies head injury or headache.   NODES: Denies enlarged lymph nodes.   CHEST: Denies chest pain or shortness of breath.   CARDIOVASCULAR: Denies palpitations or left sided chest pain.   ABDOMEN: No abdominal pain, nausea, vomiting or rectal bleeding.   URINARY: No frequency, dysuria, hematuria, or burning on urination.  REPRODUCTIVE: See HPI.   BREASTS: Denies pain, lumps, or nipple discharge.   HEMATOLOGIC: No easy bruisability.  NEUROLOGIC: Denies syncope or weakness.   PSYCHIATRIC: Denies depression.      PHYSICAL EXAM:  APPEARANCE: Well nourished, well developed, in no acute distress.  AFFECT: WNL, alert and oriented x 3  SKIN: No acne or hirsutism  NECK: Neck symmetric without masses or thyromegaly  NODES: No inguinal, cervical, axillary, or femoral lymph node enlargement  CHEST: Good respiratory effect  ABDOMEN: Soft.  No tenderness or masses.    PELVIC: Normal external genitalia without lesions.  Normal hair distribution.  Adequate perineal body, normal urethral meatus.  Vagina without lesions or discharge.  Cervix pink, without lesions, discharge or tenderness.  No significant cystocele or rectocele.  Bimanual exam shows uterus to be normal size, regular, mobile and nontender.  Adnexa without masses or tenderness.    EXTREMITIES: No edema.    ASSESSMENT:  1. Postmenopausal bleeding  2. Thickened endometrium    We again discussed her episodes of postmenopausal bleeding with a thickened endometrium and EMBX x 2 without endometrial tissue.  We reviewed management options of continued ultrasounds and EMBX vs D&C, hysteroscopy, possible polypectomy.  She would like to proceed with surgical evaluation for definitive diagnosis and " treatment.  We have discussed the risks, benefits, indications, and alternatives of the procedure in detail.  The patient verbalizes her understanding.  All questions answered.  Consents signed.  The patient agrees to proceed to proceed as planned.     PLAN:  D&C, hysteroscopy, possible polypectomy     After we complete out procedure, while still under general anesthesia, she plans to a have a liver biopsy formed by TEX

## 2023-03-30 NOTE — PROGRESS NOTES
ELLEN Pre-op Exam      HPI : Isela Graf is a 52 y.o. female   who presents with her  for evaluation and discussion of treatment options for postmenopausal bleeding with a thickened endometrium.   She has been menopausal for several years.  She noted light bleeding 10/2022 after IC which lasted for about one week.  Pelvic ultrasound showed a significantly thickened endometrium measuring 18 mm.  However, EMBX 12/15/22 did not reveal definitive endometrial tissue.  Prior evaluation for pelvic pain with pelvic ultrasound 21 showed a 8 mm endometrium.  EMBX 21 again did not reveal any endometrial tissue.    22 Pelvic sono:  FINDINGS:  Uterus:  Size: 4.2 x 4.6 x 7.9 cm  Masses: There is an anechoic area in the posterior fundus 3 x 4 x 6 mm.  Endometrium: Abnormal in this post menopausal patient, irregular in appearance with a somewhat focal area in the fundus measuring 1.6 x 1.8 x 1.8 cm.  Follow-up suggested.  Right ovary:  Size: 1.1 x 1.7 x 1.8 cm  Appearance: Normal  Vascular flow: Not assessed  Left ovary:  Not visualized.  Free Fluid:  None.  Impression:  Abnormal study.  Focal hyperechoic thickening of the endometrium within the fundus.  Follow-up suggested.  Limited assessment of the ovaries.    12/15/22 EMBX:  ENDOMETRIUM, BIOPSY:  (sounds 8 cm)  - Definitive endometrial tissue is not identified.   - Rare benign endocervical glands.   - Sample is suboptimal for evaluation of the process in question.     12/15/22 Pap: Negative, HPV: Negative        Past Medical History:   Diagnosis Date    Allergy     Alpha-1-antitrypsin deficiency 2022    Anxiety     Constipation     Depression     Hyperlipidemia     Melanoma 2015    scalp excised by Dr Aldridge    Melanoma of scalp 2015    Open wound of scalp, complicated 2015    Other seasonal allergic rhinitis     Pre-diabetes 2022    Thickened endometrium 2020    7 mm endometrial stripe.  This would be  slightly thickened for a postmenopausal patient.  Further evaluation as clinically indicated.    Uterine fibroid 2020    1.1cm Small uterine fibroid identified.     Past Surgical History:   Procedure Laterality Date    APPENDECTOMY      CHOLECYSTECTOMY      COLONOSCOPY N/A 2020    Procedure: COLONOSCOPY;  Surgeon: Quan Morton MD;  Location: [facility] ENDO (4TH FLR);  Service: Endoscopy;  Laterality: N/A;   - pt aware if drop off location and visitor policy -   COVID screening scheduled for 20 at Primary Care -     ESOPHAGOGASTRODUODENOSCOPY N/A 2020    Procedure: EGD (ESOPHAGOGASTRODUODENOSCOPY);  Surgeon: Quan Morton MD;  Location: [facility] ENDO (4TH FLR);  Service: Endoscopy;  Laterality: N/A;    EYE SURGERY      GALLBLADDER SURGERY  2017    Scalp Reconstruction      yin-yang flaps    SKIN SURGERY      removal of cancer    TONSILLECTOMY      WLE scalp melanoma  2015     Family History   Problem Relation Age of Onset    Hyperlipidemia Mother     Heart attack Mother     Hyperlipidemia Father     Cancer Father     Heart disease Father     Cancer Maternal Grandmother         BCC    Breast cancer Paternal Grandmother 50    Heart attack Paternal Grandmother     Heart disease Paternal Grandmother     Melanoma Neg Hx     Colon cancer Neg Hx     Ovarian cancer Neg Hx     Cirrhosis Neg Hx      Social History     Tobacco Use    Smoking status: Never    Smokeless tobacco: Never   Substance Use Topics    Alcohol use: Yes     Alcohol/week: 4.0 standard drinks     Types: 4 Glasses of wine per week     Comment: ~1 bottle of wine nightly x years, stopped 2022    Drug use: Yes     Types: Marijuana     Comment: medical thc gummy -daily     OB History    Para Term  AB Living   7 2 2   3 2   SAB IAB Ectopic Multiple Live Births   3              # Outcome Date GA Lbr Kumar/2nd Weight Sex Delivery Anes PTL Lv   7 Term            6 Term            5       Vag-Spont      4  "      Vag-Spont      3 SAB            2 SAB            1 SAB                /68   Ht 5' 1.5" (1.562 m)   Wt 82.2 kg (181 lb 3.5 oz)   LMP 10/12/2018 (Approximate)   BMI 33.69 kg/m²     ROS:  GENERAL: Feeling well overall.   SKIN: Denies rash or lesions.   HEAD: Denies head injury or headache.   NODES: Denies enlarged lymph nodes.   CHEST: Denies chest pain or shortness of breath.   CARDIOVASCULAR: Denies palpitations or left sided chest pain.   ABDOMEN: No abdominal pain, nausea, vomiting or rectal bleeding.   URINARY: No frequency, dysuria, hematuria, or burning on urination.  REPRODUCTIVE: See HPI.   BREASTS: Denies pain, lumps, or nipple discharge.   HEMATOLOGIC: No easy bruisability.  NEUROLOGIC: Denies syncope or weakness.   PSYCHIATRIC: Denies depression.      PHYSICAL EXAM:  APPEARANCE: Well nourished, well developed, in no acute distress.  AFFECT: WNL, alert and oriented x 3  SKIN: No acne or hirsutism  NECK: Neck symmetric without masses or thyromegaly  NODES: No inguinal, cervical, axillary, or femoral lymph node enlargement  CHEST: Good respiratory effect  ABDOMEN: Soft.  No tenderness or masses.    PELVIC: Normal external genitalia without lesions.  Normal hair distribution.  Adequate perineal body, normal urethral meatus.  Vagina without lesions or discharge.  Cervix pink, without lesions, discharge or tenderness.  No significant cystocele or rectocele.  Bimanual exam shows uterus to be normal size, regular, mobile and nontender.  Adnexa without masses or tenderness.    EXTREMITIES: No edema.    ASSESSMENT:  1. Postmenopausal bleeding  2. Thickened endometrium    We again discussed her episodes of postmenopausal bleeding with a thickened endometrium and EMBX x 2 without endometrial tissue.  We reviewed management options of continued ultrasounds and EMBX vs D&C, hysteroscopy, possible polypectomy.  She would like to proceed with surgical evaluation for definitive diagnosis and " treatment.  We have discussed the risks, benefits, indications, and alternatives of the procedure in detail.  The patient verbalizes her understanding.  All questions answered.  Consents signed.  The patient agrees to proceed to proceed as planned.     PLAN:  D&C, hysteroscopy, possible polypectomy     After we complete out procedure, while still under general anesthesia, she plans to a have a liver biopsy formed by TEX

## 2023-03-30 NOTE — H&P
C.C Postmenopausal bleeding with a thickened endometrium      HPI : Isela Graf is a 52 y.o. female   who presents for D&C, hysteroscopy for evaluation of postmenopausal bleeding with a thickened endometrium.   She has been menopausal for several years.  She noted light bleeding 10/2022 after IC which lasted for about one week.  Pelvic ultrasound showed a significantly thickened endometrium measuring 18 mm.  However, EMBX 12/15/22 did not reveal definitive endometrial tissue.  Prior evaluation for pelvic pain with pelvic ultrasound 21 showed a 8 mm endometrium.  EMBX 21 again did not reveal any endometrial tissue.    22 Pelvic sono:  FINDINGS:  Uterus:  Size: 4.2 x 4.6 x 7.9 cm  Masses: There is an anechoic area in the posterior fundus 3 x 4 x 6 mm.  Endometrium: Abnormal in this post menopausal patient, irregular in appearance with a somewhat focal area in the fundus measuring 1.6 x 1.8 x 1.8 cm.  Follow-up suggested.  Right ovary:  Size: 1.1 x 1.7 x 1.8 cm  Appearance: Normal  Vascular flow: Not assessed  Left ovary:  Not visualized.  Free Fluid:  None.  Impression:  Abnormal study.  Focal hyperechoic thickening of the endometrium within the fundus.  Follow-up suggested.  Limited assessment of the ovaries.    12/15/22 EMBX:  ENDOMETRIUM, BIOPSY:  (sounds 8 cm)  - Definitive endometrial tissue is not identified.   - Rare benign endocervical glands.   - Sample is suboptimal for evaluation of the process in question.     12/15/22 Pap: Negative, HPV: Negative        Past Medical History:   Diagnosis Date    Allergy     Alpha-1-antitrypsin deficiency 2022    Anxiety     Constipation     Depression     Hyperlipidemia     Melanoma 2015    scalp excised by Dr Aldridge    Melanoma of scalp 2015    Open wound of scalp, complicated 2015    Other seasonal allergic rhinitis     Pre-diabetes 2022    Thickened endometrium 2020    7 mm endometrial stripe.  This  would be slightly thickened for a postmenopausal patient.  Further evaluation as clinically indicated.    Uterine fibroid 2020    1.1cm Small uterine fibroid identified.     Past Surgical History:   Procedure Laterality Date    APPENDECTOMY      CHOLECYSTECTOMY      COLONOSCOPY N/A 2020    Procedure: COLONOSCOPY;  Surgeon: Quan Morton MD;  Location: [facility] ENDO (4TH FLR);  Service: Endoscopy;  Laterality: N/A;   - pt aware if drop off location and visitor policy -   COVID screening scheduled for 20 at [facility] -     ESOPHAGOGASTRODUODENOSCOPY N/A 2020    Procedure: EGD (ESOPHAGOGASTRODUODENOSCOPY);  Surgeon: Quan Morton MD;  Location: [facility] ENDO (4TH FLR);  Service: Endoscopy;  Laterality: N/A;    EYE SURGERY      GALLBLADDER SURGERY  2017    Scalp Reconstruction      yin-yang flaps    SKIN SURGERY      removal of cancer    TONSILLECTOMY      WLE scalp melanoma  2015     Family History   Problem Relation Age of Onset    Hyperlipidemia Mother     Heart attack Mother     Hyperlipidemia Father     Cancer Father     Heart disease Father     Cancer Maternal Grandmother         BCC    Breast cancer Paternal Grandmother 50    Heart attack Paternal Grandmother     Heart disease Paternal Grandmother     Melanoma Neg Hx     Colon cancer Neg Hx     Ovarian cancer Neg Hx     Cirrhosis Neg Hx      Social History     Tobacco Use    Smoking status: Never    Smokeless tobacco: Never   Substance Use Topics    Alcohol use: Yes     Alcohol/week: 4.0 standard drinks     Types: 4 Glasses of wine per week     Comment: ~1 bottle of wine nightly x years, stopped 2022    Drug use: Yes     Types: Marijuana     Comment: medical thc gummy -daily     OB History    Para Term  AB Living   7 2 2   3 2   SAB IAB Ectopic Multiple Live Births   3              # Outcome Date GA Lbr Kumar/2nd Weight Sex Delivery Anes PTL Lv   7 Term            6 Term            5       Vag-Spont     "  4       Vag-Spont      3 SAB            2 SAB            1 SAB                /68   Ht 5' 1.5" (1.562 m)   Wt 82.2 kg (181 lb 3.5 oz)   LMP 10/12/2018 (Approximate)   BMI 33.69 kg/m²     ROS:  GENERAL: Feeling well overall.   SKIN: Denies rash or lesions.   HEAD: Denies head injury or headache.   NODES: Denies enlarged lymph nodes.   CHEST: Denies chest pain or shortness of breath.   CARDIOVASCULAR: Denies palpitations or left sided chest pain.   ABDOMEN: No abdominal pain, nausea, vomiting or rectal bleeding.   URINARY: No frequency, dysuria, hematuria, or burning on urination.  REPRODUCTIVE: See HPI.   BREASTS: Denies pain, lumps, or nipple discharge.   HEMATOLOGIC: No easy bruisability.  NEUROLOGIC: Denies syncope or weakness.   PSYCHIATRIC: Denies depression.      PHYSICAL EXAM:  APPEARANCE: Well nourished, well developed, in no acute distress.  AFFECT: WNL, alert and oriented x 3  SKIN: No acne or hirsutism  NECK: Neck symmetric without masses or thyromegaly  NODES: No inguinal, cervical, axillary, or femoral lymph node enlargement  CHEST: Good respiratory effect  ABDOMEN: Soft.  No tenderness or masses.    PELVIC: Normal external genitalia without lesions.  Normal hair distribution.  Adequate perineal body, normal urethral meatus.  Vagina without lesions or discharge.  Cervix pink, without lesions, discharge or tenderness.  No significant cystocele or rectocele.  Bimanual exam shows uterus to be normal size, regular, mobile and nontender.  Adnexa without masses or tenderness.    EXTREMITIES: No edema.    ASSESSMENT:  1. Postmenopausal bleeding  2. Thickened endometrium    We again discussed her episodes of postmenopausal bleeding with a thickened endometrium and EMBX x 2 without endometrial tissue.  We reviewed management options of continued ultrasounds and EMBX vs D&C, hysteroscopy, possible polypectomy.  She would like to proceed with surgical evaluation for definitive diagnosis and " treatment.  We have discussed the risks, benefits, indications, and alternatives of the procedure in detail.  The patient verbalizes her understanding.  All questions answered.  Consents signed.  The patient agrees to proceed to proceed as planned.     PLAN:  D&C, hysteroscopy, possible polypectomy     After we complete out procedure, while still under general anesthesia, she plans to a have a liver biopsy formed by [facility]

## 2023-03-30 NOTE — ANESTHESIA PREPROCEDURE EVALUATION
03/30/2023  Isela Graf is a 52 y.o., female.      Pre-op Assessment    I have reviewed the Patient Summary Reports.     I have reviewed the Nursing Notes. I have reviewed the NPO Status.   I have reviewed the Medications.     Review of Systems  Anesthesia Hx:  Denies Family Hx of Anesthesia complications.   Denies Personal Hx of Anesthesia complications.   Social:  Non-Smoker    Hematology/Oncology:  Hematology Normal       -- Cancer in past history (melanoma 2015):    EENT/Dental:   chronic allergic rhinitis   Cardiovascular:   Exercise tolerance: good hyperlipidemia    Pulmonary:   Asthma (seasonal inhaler)    Renal/:  Renal/ Normal     Hepatic/GI:   GERD, well controlled Liver Disease, (alpha one antitrypsin def) IBS   Musculoskeletal:  Musculoskeletal Normal    Neurological:  Neurology Normal    Endocrine:   Denies Diabetes. (pre)  Obesity / BMI > 30  Dermatological:  Skin Normal    Psych:  Psychiatric Normal           Physical Exam  General: Cooperative, Alert and Oriented    Airway:  Mallampati: II   Mouth Opening: Normal  TM Distance: Normal  Neck ROM: Normal ROM    Dental:  Intact, Caps / Implants        Anesthesia Plan  Type of Anesthesia, risks & benefits discussed:    Anesthesia Type: Gen Supraglottic Airway  Intra-op Monitoring Plan: Standard ASA Monitors  Post Op Pain Control Plan: multimodal analgesia and IV/PO Opioids PRN  Induction:  IV  Informed Consent: Informed consent signed with the Patient and all parties understand the risks and agree with anesthesia plan.  All questions answered.   ASA Score: 2  Anesthesia Plan Notes: Lab in [facility] reviewed, [place]    Ready For Surgery From Anesthesia Perspective.     .

## 2023-04-01 ENCOUNTER — PATIENT MESSAGE (OUTPATIENT)
Dept: SURGERY | Facility: OTHER | Age: 53
End: 2023-04-01
Payer: COMMERCIAL

## 2023-04-03 ENCOUNTER — TELEPHONE (OUTPATIENT)
Dept: OBSTETRICS AND GYNECOLOGY | Facility: CLINIC | Age: 53
End: 2023-04-03
Payer: COMMERCIAL

## 2023-04-03 DIAGNOSIS — R93.89 THICKENED ENDOMETRIUM: ICD-10-CM

## 2023-04-03 DIAGNOSIS — N95.0 POSTMENOPAUSAL BLEEDING: ICD-10-CM

## 2023-04-03 DIAGNOSIS — N95.0 POSTMENOPAUSAL BLEEDING: Primary | ICD-10-CM

## 2023-04-03 RX ORDER — MISOPROSTOL 200 UG/1
TABLET ORAL
Qty: 2 TABLET | Refills: 0 | Status: SHIPPED | OUTPATIENT
Start: 2023-04-03 | End: 2023-04-03 | Stop reason: SDUPTHER

## 2023-04-03 RX ORDER — MISOPROSTOL 200 UG/1
TABLET ORAL
Qty: 2 TABLET | Refills: 0 | Status: SHIPPED | OUTPATIENT
Start: 2023-04-03 | End: 2024-02-06

## 2023-04-03 NOTE — TELEPHONE ENCOUNTER
Isela BOOKER Staff (supporting Philip Ochoa MD) 2 days ago       Ill checked at the medicine shop today. And my prescription was not there.     Thank you      Isela Graf

## 2023-04-03 NOTE — TELEPHONE ENCOUNTER
Interface, Surescripts Out  SHERRI BOOKER Staff  An error occurred while processing the e-prescribing message.     The message was not sent electronically to the requested pharmacy. Contact the pharmacy about the new prescription.           Outpatient Medication Detail     Disp Refills Start End    miSOPROStoL (CYTOTEC) 200 MCG Tab 2 tablet 0 4/3/2023     Sig: Take one tablet the night before and one tablet the morning of surgery.    Sent to pharmacy as: miSOPROStoL (CYTOTEC) 200 MCG Tab    E-Prescribing Status: Transmission to pharmacy failed (4/3/2023 10:38 AM CDT)        Pharmacy    [id number] - [address] [address]   Phone: [phone] Fax: [phone]   Hours: Not open 24 hours       Order Providers    Prescribing Provider Encounter Provider   MD Philip Kendall MD         Associated Diagnoses    Postmenopausal bleeding  - Primary       Thickened endometrium           Encounter    View Encounter

## 2023-04-04 ENCOUNTER — HOSPITAL ENCOUNTER (OUTPATIENT)
Facility: OTHER | Age: 53
Discharge: HOME | End: 2023-04-04
Attending: OBSTETRICS & GYNECOLOGY | Admitting: RADIOLOGY
Payer: COMMERCIAL

## 2023-04-04 ENCOUNTER — ANESTHESIA (OUTPATIENT)
Dept: SURGERY | Facility: OTHER | Age: 53
End: 2023-04-04
Payer: COMMERCIAL

## 2023-04-04 ENCOUNTER — TELEPHONE (OUTPATIENT)
Dept: OBSTETRICS AND GYNECOLOGY | Facility: CLINIC | Age: 53
End: 2023-04-04
Payer: COMMERCIAL

## 2023-04-04 VITALS
HEART RATE: 70 BPM | DIASTOLIC BLOOD PRESSURE: 68 MMHG | WEIGHT: 181 LBS | OXYGEN SATURATION: 100 % | SYSTOLIC BLOOD PRESSURE: 130 MMHG | BODY MASS INDEX: 34.17 KG/M2 | RESPIRATION RATE: 18 BRPM | HEIGHT: 61 IN | TEMPERATURE: 99 F

## 2023-04-04 DIAGNOSIS — K76.0 FATTY LIVER: ICD-10-CM

## 2023-04-04 DIAGNOSIS — R93.89 THICKENED ENDOMETRIUM: ICD-10-CM

## 2023-04-04 DIAGNOSIS — N95.0 POSTMENOPAUSAL BLEEDING: Primary | ICD-10-CM

## 2023-04-04 DIAGNOSIS — K76.89 LIVER DYSFUNCTION: ICD-10-CM

## 2023-04-04 PROCEDURE — 63600175 PHARM REV CODE 636 W HCPCS: Performed by: NURSE ANESTHETIST, CERTIFIED REGISTERED

## 2023-04-04 PROCEDURE — 25000003 PHARM REV CODE 250: Performed by: NURSE ANESTHETIST, CERTIFIED REGISTERED

## 2023-04-04 PROCEDURE — 88313 SPECIAL STAINS GROUP 2: CPT | Mod: 26,,, | Performed by: PATHOLOGY

## 2023-04-04 PROCEDURE — 63600175 PHARM REV CODE 636 W HCPCS: Performed by: ANESTHESIOLOGY

## 2023-04-04 PROCEDURE — 36000707: Performed by: OBSTETRICS & GYNECOLOGY

## 2023-04-04 PROCEDURE — 88307 TISSUE EXAM BY PATHOLOGIST: CPT | Performed by: PATHOLOGY

## 2023-04-04 PROCEDURE — 25000242 PHARM REV CODE 250 ALT 637 W/ HCPCS: Performed by: ANESTHESIOLOGY

## 2023-04-04 PROCEDURE — 88305 TISSUE EXAM BY PATHOLOGIST: ICD-10-PCS | Mod: 26,,, | Performed by: PATHOLOGY

## 2023-04-04 PROCEDURE — 25000003 PHARM REV CODE 250: Performed by: OBSTETRICS & GYNECOLOGY

## 2023-04-04 PROCEDURE — 27201423 OPTIME MED/SURG SUP & DEVICES STERILE SUPPLY: Performed by: OBSTETRICS & GYNECOLOGY

## 2023-04-04 PROCEDURE — 25000003 PHARM REV CODE 250: Performed by: ANESTHESIOLOGY

## 2023-04-04 PROCEDURE — 36000706: Performed by: OBSTETRICS & GYNECOLOGY

## 2023-04-04 PROCEDURE — 88307 TISSUE EXAM BY PATHOLOGIST: CPT | Mod: 26,,, | Performed by: PATHOLOGY

## 2023-04-04 PROCEDURE — C1782 MORCELLATOR: HCPCS | Performed by: OBSTETRICS & GYNECOLOGY

## 2023-04-04 PROCEDURE — 88305 TISSUE EXAM BY PATHOLOGIST: CPT | Mod: 26,,, | Performed by: PATHOLOGY

## 2023-04-04 PROCEDURE — 71000033 HC RECOVERY, INTIAL HOUR: Performed by: OBSTETRICS & GYNECOLOGY

## 2023-04-04 PROCEDURE — 88307 PR  SURG PATH,LEVEL V: ICD-10-PCS | Mod: 26,,, | Performed by: PATHOLOGY

## 2023-04-04 PROCEDURE — 88305 TISSUE EXAM BY PATHOLOGIST: CPT | Mod: 59 | Performed by: PATHOLOGY

## 2023-04-04 PROCEDURE — 71000039 HC RECOVERY, EACH ADD'L HOUR: Performed by: OBSTETRICS & GYNECOLOGY

## 2023-04-04 PROCEDURE — 88313 PR  SPECIAL STAINS,GROUP II: ICD-10-PCS | Mod: 26,,, | Performed by: PATHOLOGY

## 2023-04-04 PROCEDURE — 88313 SPECIAL STAINS GROUP 2: CPT | Mod: 59 | Performed by: PATHOLOGY

## 2023-04-04 PROCEDURE — 58558 PR HYSTEROSCOPY,W/ENDO BX: ICD-10-PCS | Mod: ,,, | Performed by: OBSTETRICS & GYNECOLOGY

## 2023-04-04 PROCEDURE — 58558 HYSTEROSCOPY BIOPSY: CPT | Mod: ,,, | Performed by: OBSTETRICS & GYNECOLOGY

## 2023-04-04 PROCEDURE — 71000016 HC POSTOP RECOV ADDL HR: Performed by: OBSTETRICS & GYNECOLOGY

## 2023-04-04 PROCEDURE — 71000015 HC POSTOP RECOV 1ST HR: Performed by: OBSTETRICS & GYNECOLOGY

## 2023-04-04 PROCEDURE — 37000008 HC ANESTHESIA 1ST 15 MINUTES: Performed by: OBSTETRICS & GYNECOLOGY

## 2023-04-04 PROCEDURE — 37000009 HC ANESTHESIA EA ADD 15 MINS: Performed by: OBSTETRICS & GYNECOLOGY

## 2023-04-04 RX ORDER — MISOPROSTOL 100 UG/1
400 TABLET ORAL ONCE
Status: DISCONTINUED | OUTPATIENT
Start: 2023-04-04 | End: 2023-04-04

## 2023-04-04 RX ORDER — IBUPROFEN 600 MG/1
600 TABLET ORAL EVERY 6 HOURS PRN
Qty: 30 TABLET | Refills: 0 | Status: SHIPPED | OUTPATIENT
Start: 2023-04-04 | End: 2024-02-06

## 2023-04-04 RX ORDER — LIDOCAINE HYDROCHLORIDE 10 MG/ML
0.5 INJECTION, SOLUTION EPIDURAL; INFILTRATION; INTRACAUDAL; PERINEURAL ONCE
Status: DISCONTINUED | OUTPATIENT
Start: 2023-04-04 | End: 2023-04-04 | Stop reason: HOSPADM

## 2023-04-04 RX ORDER — FENTANYL CITRATE 50 UG/ML
INJECTION, SOLUTION INTRAMUSCULAR; INTRAVENOUS
Status: DISCONTINUED | OUTPATIENT
Start: 2023-04-04 | End: 2023-04-04

## 2023-04-04 RX ORDER — PHENYLEPHRINE HYDROCHLORIDE 10 MG/ML
INJECTION INTRAVENOUS
Status: DISCONTINUED | OUTPATIENT
Start: 2023-04-04 | End: 2023-04-04

## 2023-04-04 RX ORDER — DEXTROSE, SODIUM CHLORIDE, SODIUM LACTATE, POTASSIUM CHLORIDE, AND CALCIUM CHLORIDE 5; .6; .31; .03; .02 G/100ML; G/100ML; G/100ML; G/100ML; G/100ML
INJECTION, SOLUTION INTRAVENOUS CONTINUOUS
Status: DISCONTINUED | OUTPATIENT
Start: 2023-04-04 | End: 2023-04-04 | Stop reason: HOSPADM

## 2023-04-04 RX ORDER — SODIUM CHLORIDE 0.9 % (FLUSH) 0.9 %
3 SYRINGE (ML) INJECTION
Status: DISCONTINUED | OUTPATIENT
Start: 2023-04-04 | End: 2023-04-04 | Stop reason: HOSPADM

## 2023-04-04 RX ORDER — OXYCODONE HYDROCHLORIDE 5 MG/1
5 TABLET ORAL
Status: DISCONTINUED | OUTPATIENT
Start: 2023-04-04 | End: 2023-04-04 | Stop reason: HOSPADM

## 2023-04-04 RX ORDER — HYDROMORPHONE HYDROCHLORIDE 2 MG/ML
0.4 INJECTION, SOLUTION INTRAMUSCULAR; INTRAVENOUS; SUBCUTANEOUS EVERY 5 MIN PRN
Status: DISCONTINUED | OUTPATIENT
Start: 2023-04-04 | End: 2023-04-04 | Stop reason: HOSPADM

## 2023-04-04 RX ORDER — PROPOFOL 10 MG/ML
VIAL (ML) INTRAVENOUS
Status: DISCONTINUED | OUTPATIENT
Start: 2023-04-04 | End: 2023-04-04

## 2023-04-04 RX ORDER — MEPERIDINE HYDROCHLORIDE 25 MG/ML
12.5 INJECTION INTRAMUSCULAR; INTRAVENOUS; SUBCUTANEOUS ONCE AS NEEDED
Status: DISCONTINUED | OUTPATIENT
Start: 2023-04-04 | End: 2023-04-04 | Stop reason: HOSPADM

## 2023-04-04 RX ORDER — LIDOCAINE HYDROCHLORIDE 20 MG/ML
INJECTION INTRAVENOUS
Status: DISCONTINUED | OUTPATIENT
Start: 2023-04-04 | End: 2023-04-04

## 2023-04-04 RX ORDER — ALBUTEROL SULFATE 2.5 MG/.5ML
2.5 SOLUTION RESPIRATORY (INHALATION)
Status: COMPLETED | OUTPATIENT
Start: 2023-04-04 | End: 2023-04-04

## 2023-04-04 RX ORDER — ONDANSETRON 2 MG/ML
INJECTION INTRAMUSCULAR; INTRAVENOUS
Status: DISCONTINUED | OUTPATIENT
Start: 2023-04-04 | End: 2023-04-04

## 2023-04-04 RX ORDER — DEXAMETHASONE SODIUM PHOSPHATE 4 MG/ML
INJECTION, SOLUTION INTRA-ARTICULAR; INTRALESIONAL; INTRAMUSCULAR; INTRAVENOUS; SOFT TISSUE
Status: DISCONTINUED | OUTPATIENT
Start: 2023-04-04 | End: 2023-04-04

## 2023-04-04 RX ORDER — MIDAZOLAM HYDROCHLORIDE 1 MG/ML
INJECTION INTRAMUSCULAR; INTRAVENOUS
Status: DISCONTINUED | OUTPATIENT
Start: 2023-04-04 | End: 2023-04-04

## 2023-04-04 RX ORDER — OXYCODONE AND ACETAMINOPHEN 5; 325 MG/1; MG/1
1 TABLET ORAL EVERY 6 HOURS PRN
Qty: 5 EACH | Refills: 0 | Status: SHIPPED | OUTPATIENT
Start: 2023-04-04 | End: 2024-02-06

## 2023-04-04 RX ORDER — KETOROLAC TROMETHAMINE 30 MG/ML
INJECTION, SOLUTION INTRAMUSCULAR; INTRAVENOUS
Status: DISCONTINUED | OUTPATIENT
Start: 2023-04-04 | End: 2023-04-04

## 2023-04-04 RX ORDER — ACETAMINOPHEN 500 MG
1000 TABLET ORAL
Status: COMPLETED | OUTPATIENT
Start: 2023-04-04 | End: 2023-04-04

## 2023-04-04 RX ORDER — PROCHLORPERAZINE EDISYLATE 5 MG/ML
5 INJECTION INTRAMUSCULAR; INTRAVENOUS EVERY 30 MIN PRN
Status: DISCONTINUED | OUTPATIENT
Start: 2023-04-04 | End: 2023-04-04 | Stop reason: HOSPADM

## 2023-04-04 RX ORDER — SODIUM CHLORIDE, SODIUM LACTATE, POTASSIUM CHLORIDE, CALCIUM CHLORIDE 600; 310; 30; 20 MG/100ML; MG/100ML; MG/100ML; MG/100ML
INJECTION, SOLUTION INTRAVENOUS CONTINUOUS
Status: DISCONTINUED | OUTPATIENT
Start: 2023-04-04 | End: 2023-04-04 | Stop reason: HOSPADM

## 2023-04-04 RX ADMIN — PROPOFOL 170 MG: 10 INJECTION, EMULSION INTRAVENOUS at 12:04

## 2023-04-04 RX ADMIN — ACETAMINOPHEN 1000 MG: 500 TABLET, FILM COATED ORAL at 11:04

## 2023-04-04 RX ADMIN — SODIUM CHLORIDE, SODIUM LACTATE, POTASSIUM CHLORIDE, AND CALCIUM CHLORIDE: 600; 310; 30; 20 INJECTION, SOLUTION INTRAVENOUS at 11:04

## 2023-04-04 RX ADMIN — MIDAZOLAM HYDROCHLORIDE 2 MG: 1 INJECTION, SOLUTION INTRAMUSCULAR; INTRAVENOUS at 11:04

## 2023-04-04 RX ADMIN — LIDOCAINE HYDROCHLORIDE 100 MG: 20 INJECTION, SOLUTION INTRAVENOUS at 12:04

## 2023-04-04 RX ADMIN — KETOROLAC TROMETHAMINE 30 MG: 30 INJECTION, SOLUTION INTRAMUSCULAR; INTRAVENOUS at 12:04

## 2023-04-04 RX ADMIN — FENTANYL CITRATE 100 MCG: 50 INJECTION, SOLUTION INTRAMUSCULAR; INTRAVENOUS at 12:04

## 2023-04-04 RX ADMIN — ONDANSETRON HYDROCHLORIDE 4 MG: 2 INJECTION INTRAMUSCULAR; INTRAVENOUS at 12:04

## 2023-04-04 RX ADMIN — PHENYLEPHRINE HYDROCHLORIDE 200 MCG: 10 INJECTION INTRAVENOUS at 12:04

## 2023-04-04 RX ADMIN — DEXAMETHASONE SODIUM PHOSPHATE 8 MG: 4 INJECTION, SOLUTION INTRAMUSCULAR; INTRAVENOUS at 12:04

## 2023-04-04 RX ADMIN — DOXYCYCLINE 200 MG: 100 INJECTION, POWDER, LYOPHILIZED, FOR SOLUTION INTRAVENOUS at 12:04

## 2023-04-04 RX ADMIN — ALBUTEROL SULFATE 2.5 MG: 2.5 SOLUTION RESPIRATORY (INHALATION) at 11:04

## 2023-04-04 RX ADMIN — OXYCODONE HYDROCHLORIDE 5 MG: 5 TABLET ORAL at 01:04

## 2023-04-04 RX ADMIN — CARBOXYMETHYLCELLULOSE SODIUM 2 DROP: 2.5 SOLUTION/ DROPS OPHTHALMIC at 12:04

## 2023-04-04 NOTE — DISCHARGE SUMMARY
[facility]  Brief Op Note/Discharge Note  Short Stay    Admit Date: 4/4/2023    Discharge Date: 04/04/2023    Attending Physician: Philip Ochoa MD     Surgery Date: 4/4/2023     Surgeon(s) and Role:  Panel 1:     * Philip Ochoa MD - Primary     * Judy Russo MD - Resident - Assisting  Panel 2:     * Polo Alvarado MD - Primary    Assisting Surgeon: None    Pre-op Diagnosis:  Postmenopausal bleeding [N95.0]  Thickened endometrium [R93.89]    Post-op Diagnosis:  Post-Op Diagnosis Codes:     * Postmenopausal bleeding [N95.0]     * Thickened endometrium [R93.89]    Procedure(s) (LRB):  HYSTEROSCOPY, WITH DILATION AND CURETTAGE OF UTERUS (N/A)  BIOPSY, LIVER ULTRA SOUND GUIDANCE (N/A)  NEEDLE LOCALIZATION (N/A)    Anesthesia: General    Findings/Key Components:   1. Normal appearing external female genitalia, vaginal and cervical mucosa  2. Cervix descends to within 3 cm of the introitus with traction - would be a reasonable candidate for TVH if hysterectomy is indicated in the future  3. Uterus sounded to 8 cm, dilated without difficulty to easily accommodate a 5 mm hysteroscope  4. Hysteroscopically, atrophic endometrial cavity. Bilateral ostia visualized  5. No polyps/fibroids identified. Small area of hyperplastic endometrial tissue noted on posterior portion of uterine cavity near internal os, removed with myosure. Specimen to pathology  6. Sharp curettage performed; specimen to pathology  7. Intraop IR guided liver biopsy, please see procedure note for further details    Estimated Blood Loss: <5cc         Specimens:   Specimen (24h ago, onward)       Start     Ordered    04/04/23 1325  Specimen to Pathology, Surgery Liver  Once        Comments: Pre-op Diagnosis: Postmenopausal bleeding [N95.0]Thickened endometrium [R93.89]Procedure(s):HYSTEROSCOPY, WITH DILATION AND CURETTAGE OF UTERUSBIOPSY, LIVER ULTRA SOUND GUIDANCENEEDLE LOCALIZATION Number of specimens: 1Name of specimens: 3/  liver Biopsy     References:    Click here for ordering Quick Tip   Question Answer Comment   Procedure Type: Liver    Specimen Class: Routine/Screening    Which provider would you like to cc? LIZETTE CLAYTON    Release to patient Immediate        04/04/23 1329    04/04/23 1316  Specimen to Pathology, Surgery Gynecology and Obstetrics  Once        Comments: Pre-op Diagnosis: Postmenopausal bleeding [N95.0]Thickened endometrium [R93.89]Procedure(s):HYSTEROSCOPY, WITH DILATION AND CURETTAGE OF UTERUSBIOPSY, LIVER ULTRA SOUND GUIDANCENEEDLE LOCALIZATION Number of specimens: 2Name of specimens: 1/ endometrial scrapings2/ posterior endometrium     References:    Click here for ordering Quick Tip   Question Answer Comment   Procedure Type: Gynecology and Obstetrics    Specimen Class: Routine/Screening    Which provider would you like to cc? MARCELLUS GONZALEZ    Release to patient Immediate        04/04/23 1320                    Diagnoses:  Active Hospital Problems    Diagnosis  POA    Postmenopausal bleeding [N95.0]  Yes      Resolved Hospital Problems   No resolved problems to display.       Discharged Condition: good    Hospital Course:   Patient was admitted for outpatient procedure as above, and tolerated the procedure well with no complications. Please see operative report for further details. Following the procedure, the patient was awakened from anesthesia and transferred to the recovery area in stable condition. She was discharged to home once ambulating, voiding, tolerating PO intake, and pain was well-controlled. Patient was given routine post-op instructions and prescriptions for pain medication to take as needed. Patient instructed to follow up with primary OBGYN in 3 weeks.    Final Diagnoses: Same as principal problem.    Disposition: Home or Self Care    Follow up/Patient Instructions:    Medications:  Reconciled Home Medications:      Medication List        START taking these medications       ibuprofen 600 MG tablet  Commonly known as: ADVIL,MOTRIN  Take 1 tablet (600 mg total) by mouth every 6 (six) hours as needed for Pain.     oxyCODONE-acetaminophen 5-325 mg per tablet  Commonly known as: PERCOCET  Take 1 tablet by mouth every 6 (six) hours as needed for Pain.            CONTINUE taking these medications      albuterol 90 mcg/actuation inhaler  Commonly known as: PROVENTIL/VENTOLIN HFA  USE 2 INHALATIONS EVERY 6 HOURS AS NEEDED FOR WHEEZING     BODY, HAIR, SKIN AND NAILS ORAL  Take by mouth.     calcium carbonate 600 mg calcium (1,500 mg) Tab  Commonly known as: OS-ADAMA  Take 600 mg by mouth once daily.     citalopram 10 MG tablet  Commonly known as: CeleXA  Take 1 tablet (10 mg total) by mouth every evening.     esomeprazole 20 MG capsule  Commonly known as: NEXIUM  Take 1 capsule (20 mg total) by mouth before breakfast.     fexofenadine 180 MG tablet  Commonly known as: ALLEGRA  Take 180 mg by mouth as needed.     fish oil-omega-3 fatty acids 300-1,000 mg capsule  Take 2 g by mouth after lunch.     fluticasone propionate 50 mcg/actuation nasal spray  Commonly known as: FLONASE  2 sprays (100 mcg total) by Each Nostril route daily as needed.     ketoconazole 2 % shampoo  Commonly known as: NIZORAL  Wash hair with medicated shampoo at least 2x/week - let sit on scalp at least 5 minutes prior to rinsing     miSOPROStoL 200 MCG Tab  Commonly known as: CYTOTEC  Take one tablet the night before and one tablet the morning of surgery.     multivit-iron-min-folic acid 3,500-18-0.4 unit-mg-mg Chew  Take 1 tablet by mouth after lunch.     polyethylene glycol 17 gram Pwpk  Commonly known as: GLYCOLAX  Take 17 g by mouth daily as needed.     psyllium powder  Commonly known as: METAMUCIL  Take 1 packet by mouth once daily.     rosuvastatin 20 MG tablet  Commonly known as: CRESTOR  Take 1 tablet (20 mg total) by mouth every evening.     tiZANidine 4 MG tablet  Commonly known as: ZANAFLEX  Take 1 tablet (4 mg  total) by mouth every 8 (eight) hours.     triamcinolone acetonide 0.025% 0.025 % cream  Commonly known as: KENALOG  Apply topically 2 (two) times daily. To affected areas on face x 1-2 wks then prn flares only            Discharge Procedure Orders   Diet Adult Regular     No driving until:   Order Comments: Comfortable stepping on gas and brakes     Pelvic Rest   Order Comments: Nothing in vagina, including intercourse, for at least six weeks     Notify your health care provider if you experience any of the following:  temperature >100.4     Notify your health care provider if you experience any of the following:  persistent nausea and vomiting or diarrhea     Notify your health care provider if you experience any of the following:  severe uncontrolled pain     Notify your health care provider if you experience any of the following:  redness, tenderness, or signs of infection (pain, swelling, redness, odor or green/yellow discharge around incision site)     Notify your health care provider if you experience any of the following:  severe persistent headache     Notify your health care provider if you experience any of the following:  persistent dizziness, light-headedness, or visual disturbances     Notify your health care provider if you experience any of the following:  increased confusion or weakness     Notify your health care provider if you experience any of the following:   Order Comments: Heavy vaginal bleeding, saturating more than two pads in one hour     Activity as tolerated       Judy Russo MD  OB/GYN PGY-1

## 2023-04-04 NOTE — ANESTHESIA POSTPROCEDURE EVALUATION
Anesthesia Post Evaluation    Patient: Isela Graf    Procedure(s) Performed: Procedure(s) (LRB):  HYSTEROSCOPY, WITH DILATION AND CURETTAGE OF UTERUS (N/A)  BIOPSY, LIVER ULTRA SOUND GUIDANCE (N/A)  NEEDLE LOCALIZATION (N/A)    Final Anesthesia Type: general      Patient location during evaluation: PACU  Patient participation: Yes- Able to Participate  Level of consciousness: awake and alert  Post-procedure vital signs: reviewed and stable  Pain management: adequate  Airway patency: patent    PONV status at discharge: No PONV  Anesthetic complications: no      Cardiovascular status: blood pressure returned to baseline and stable  Respiratory status: room air  Hydration status: euvolemic  Follow-up not needed.          Vitals Value Taken Time   /59 04/04/23 1349   Temp 36.4 °C (97.5 °F) 04/04/23 1330   Pulse 77 04/04/23 1401   Resp 17 04/04/23 1330   SpO2 97 % 04/04/23 1401   Vitals shown include unvalidated device data.      No case tracking events are documented in the log.      Pain/Susanne Score: Pain Rating Prior to Med Admin: 5 (4/4/2023  1:17 PM)  Pain Rating Post Med Admin: 3 (4/4/2023  1:42 PM)  Susanne Score: 10 (4/4/2023  1:42 PM)

## 2023-04-04 NOTE — H&P
Consult/H&P Note  Interventional Radiology    Consult Requested By: hepatology    Reason for Consult: liver dysfunction    SUBJECTIVE:     Chief Complaint: liver dysfunction    History of Present Illness: 53 yo F with liver dysfunction, needs liver biopsy.  She is undergoing hysteroscopy with gyn and anesthesia.  We will biopsy following the hysteroscopy.     Past Medical History:   Diagnosis Date    Allergy     Alpha-1-antitrypsin deficiency 07/25/2022    Anxiety     Constipation     Depression     Hyperlipidemia     Melanoma 01/26/2015    scalp excised by Dr Aldridge    Melanoma of scalp 01/12/2015    Open wound of scalp, complicated 02/06/2015    Other seasonal allergic rhinitis     Pre-diabetes 07/25/2022    Thickened endometrium 06/26/2020    7 mm endometrial stripe.  This would be slightly thickened for a postmenopausal patient.  Further evaluation as clinically indicated.    Uterine fibroid 06/2020    1.1cm Small uterine fibroid identified.     Past Surgical History:   Procedure Laterality Date    APPENDECTOMY      CHOLECYSTECTOMY      COLONOSCOPY N/A 06/02/2020    Procedure: COLONOSCOPY;  Surgeon: Quan Morton MD;  [facility]  Service: Endoscopy;  Laterality: N/A;  5/13 - pt aware if drop off location and visitor policy -   COVID screening scheduled for 6/1/20 at [facility] -     ESOPHAGOGASTRODUODENOSCOPY N/A 06/02/2020    Procedure: EGD (ESOPHAGOGASTRODUODENOSCOPY);  Surgeon: Quan Morton MD;  [facility]  Service: Endoscopy;  Laterality: N/A;    EYE SURGERY      GALLBLADDER SURGERY  02/2017    Scalp Reconstruction      yin-yang flaps    SKIN SURGERY      removal of cancer    TONSILLECTOMY      WLE scalp melanoma  01/26/2015     Family History   Problem Relation Age of Onset    Hyperlipidemia Mother     Heart attack Mother     Hyperlipidemia Father     Cancer Father     Heart disease Father     Cancer Maternal Grandmother         BCC    Breast cancer  Paternal Grandmother 50    Heart attack Paternal Grandmother     Heart disease Paternal Grandmother     Melanoma Neg Hx     Colon cancer Neg Hx     Ovarian cancer Neg Hx     Cirrhosis Neg Hx      Social History     Tobacco Use    Smoking status: Never    Smokeless tobacco: Never   Substance Use Topics    Alcohol use: Yes     Alcohol/week: 4.0 standard drinks     Types: 4 Glasses of wine per week     Comment: ~1 bottle of wine nightly x years, stopped 7/2022    Drug use: Yes     Types: Marijuana     Comment: medical thc gummy -daily       Review of Systems:  Constitutional/General:No fever, chills, change in appetite or weight loss.  Hematological/Immuno: no known coagulopathies  Respiratory: no shortness of breath  Cardiovascular: no chest pain  Gastrointestinal: no abdominal pain  Genito-Urinary: no dysuria  Musculoskeletal: negative  Skin: Negative for rash, itching, pigmentation changes, nail or hair changes.  Neurological: no TIA or stroke symptoms  Psychiatric: normal mood/affect, good insight/judgement      OBJECTIVE:     Vital Signs Range (Last 24H):  Temp:  [97.7 °F (36.5 °C)]   Pulse:  [83]   Resp:  [16]   BP: (128)/(62)   SpO2:  [98 %]     Physical Exam:  General- Patient alert and oriented x3 in NAD  ENT- PERRLA,  Neck- No masses  CV- Regular rate and rhythm  Resp-  No increased WOB  GI- Non tender/non-distended  Extrem- No cyanosis, clubbing, edema.   Derm- No rashes, masses, or lesions noted  Neuro-  No focal deficits noted.     Physical Exam  Body mass index is 34.2 kg/m².    Scheduled Meds:    albuterol sulfate  2.5 mg Nebulization Once Pre-Op    LIDOcaine (PF) 10 mg/ml (1%)  0.5 mL Intradermal Once     Continuous Infusions:    dextrose 5% lactated ringers      lactated ringers       PRN Meds:doxycycline (VIBRAMYCIN) IVPB    Allergies:   Review of patient's allergies indicates:   Allergen Reactions    Sulfa (sulfonamide antibiotics) Other (See Comments)     Stomach pain       Labs:  No results for  input(s): INR in the last 168 hours.    Invalid input(s):  PT,  PTT  No results for input(s): WBC, HGB, HCT, MCV, PLT in the last 168 hours. No results for input(s): GLU, NA, K, CL, CO2, BUN, CREATININE, CALCIUM, MG, ALT, AST, ALBUMIN, BILITOT, BILIDIR in the last 168 hours.    Vitals (Most Recent):  Temp: 97.7 °F (36.5 °C) (04/04/23 1053)  Pulse: 83 (04/04/23 1053)  Resp: 16 (04/04/23 1053)  BP: 128/62 (04/04/23 1053)  SpO2: 98 % (04/04/23 1053)    ASA: 2  Mallampati: 2    Consent obtained    ASSESSMENT/PLAN:     Random liver biopsy. Sedation per anesthesia.  To follow hysteroscopy in OR>    Active Hospital Problems    Diagnosis  POA    Postmenopausal bleeding [N95.0]  Yes      Resolved Hospital Problems   No resolved problems to display.           Polo Alvarado MD

## 2023-04-04 NOTE — DISCHARGE INSTRUCTIONS
Home Care Instruction D&C Hysteroscopy             ACTIVITY LEVEL:  If you received sedation and/or an anesthetic, you may feel sleepy for several hours. Rest until you feel more  awake. Gradually resume your normal activities.    DIET:  At home, continue with liquids. If there is no nausea, you may eat a soft diet and gradually resume a regular diet.    BATHING:  You may shower  as desired in one day.  You should avoid tub baths, hot tubs and swimming pools until seen by your physician for a post-op follow up.    CARE:  You can expect watery or bloody vaginal discharge for several days. Do not use tampons, please only use pads. Sexual activity is restricted as advised by your doctor.    MEDICATIONS:  You will receive instructions for any pain and/or antibiotic prescriptions. Pain medication should be taken only if needed and as directed. Antibiotics, if ordered, should be taken as directed until the entire prescription is completed.    ADDITIONAL INFORMATION:  __________________________________________________________________________________________  WHEN TO CALL THE DOCTOR:   For any heavy vaginal bleeding   Fever over 101°F (38.4°C)   Any lower abdominal pain not relieved by the pain mediation  RETURN APPOINTMENT:  __________________________________________________________________________________________  FOR EMERGENCIES:  If any unusual problems or difficulties occur, contact  __________________ or the resident at [phone] (page ) or the Clinic office, [phone].     Rest today   No bath tub no swimming pool  Shower in 48 hours

## 2023-04-04 NOTE — PROCEDURES
Radiology Post-Procedure Note    Pre Op Diagnosis: liver dysfunction  Post Op Diagnosis: Same    Procedure: liver biopsy    Procedure performed by: Polo Alvarado MD    Written Informed Consent Obtained: Yes  Specimen Removed: YES 4 core specimens  Estimated Blood Loss: Minimal    Findings:   Random liver biopsy    Patient tolerated procedure well.    @SIG@

## 2023-04-04 NOTE — INTERVAL H&P NOTE
The patient has been examined and the H&P has been reviewed:    I concur with the findings and no changes have occurred since H&P was written.    Surgery risks, benefits and alternative options discussed and understood by patient/family.    Active Hospital Problems    Diagnosis  POA    Postmenopausal bleeding [N95.0]  Yes      Resolved Hospital Problems   No resolved problems to display.

## 2023-04-04 NOTE — ANESTHESIA PROCEDURE NOTES
Intubation    Date/Time: 4/4/2023 12:06 PM  Performed by: Jovany Rizvi CRNA  Authorized by: Mu Clayton MD     Intubation:     Induction:  Intravenous    Intubated:  Postinduction    Mask Ventilation:  Easy mask    Attempts:  1    Attempted By:  CRNA    Difficult Airway Encountered?: No      Complications:  None    Airway Device:  Supraglottic airway/LMA    Airway Device Size:  4.0    Style/Cuff Inflation:  Uncuffed    Secured at:  The lips    Placement Verified By:  Capnometry    Complicating Factors:  None    Findings Post-Intubation:  BS equal bilateral

## 2023-04-04 NOTE — TELEPHONE ENCOUNTER
POST-OP CHECK    S/P D&C, hysteroscopy today.    Doing well.  Denies pain.  No bleeding.  No nausea.    We discussed findings and reviewed post-op instructions.    Follow-up in office in 3 weeks.

## 2023-04-04 NOTE — TRANSFER OF CARE
"Anesthesia Transfer of Care Note    Patient: Isela Graf    Procedure(s) Performed: Procedure(s) (LRB):  HYSTEROSCOPY, WITH DILATION AND CURETTAGE OF UTERUS (N/A)  BIOPSY, LIVER ULTRA SOUND GUIDANCE (N/A)  NEEDLE LOCALIZATION (N/A)    Patient location: PACU    Anesthesia Type: general    Transport from OR: Transported from OR on 2-3 L/min O2 by NC with adequate spontaneous ventilation    Post pain: adequate analgesia    Post assessment: no apparent anesthetic complications    Post vital signs: stable    Level of consciousness: awake    Nausea/Vomiting: no nausea/vomiting    Complications: none    Transfer of care protocol was followed      Last vitals:   Visit Vitals  /62 (BP Location: Left arm, Patient Position: Sitting)   Pulse 86   Temp 36.5 °C (97.7 °F) (Oral)   Resp 16   Ht 5' 1" (1.549 m)   Wt 82.1 kg (181 lb)   LMP 10/12/2018 (Approximate)   SpO2 100%   Breastfeeding No   BMI 34.20 kg/m²     "

## 2023-04-06 ENCOUNTER — TELEPHONE (OUTPATIENT)
Dept: OBSTETRICS AND GYNECOLOGY | Facility: CLINIC | Age: 53
End: 2023-04-06
Payer: COMMERCIAL

## 2023-04-06 NOTE — TELEPHONE ENCOUNTER
----- Message from Lexie Rasmussen MA sent at 4/5/2023  4:38 PM CDT -----  MD Gabriela Kendall Staff 22 hours ago (5:56 PM)      She will need post op check in about 3 weeks.   Thanks     Philip Ochoa MD 22 hours ago (5:56 PM)      POST-OP CHECK     S/P D&C, hysteroscopy today.     Doing well.  Denies pain.  No bleeding.  No nausea.     We discussed findings and reviewed post-op instructions.

## 2023-04-10 LAB
COMMENT: NORMAL
FINAL PATHOLOGIC DIAGNOSIS: NORMAL
GROSS: NORMAL
Lab: NORMAL

## 2023-04-11 ENCOUNTER — TELEPHONE (OUTPATIENT)
Dept: OBSTETRICS AND GYNECOLOGY | Facility: CLINIC | Age: 53
End: 2023-04-11
Payer: COMMERCIAL

## 2023-04-11 NOTE — TELEPHONE ENCOUNTER
Patient was advised per Dr Gabriela Vernon or Motrin ok to take and light spotting after the procedure can occur.

## 2023-04-11 NOTE — TELEPHONE ENCOUNTER
----- Message from Kath Carrizales sent at 4/11/2023  1:50 PM CDT -----  Called patient to schedule her post op. She states she has light Spotting with no pain. She would like to know when she could start taking her Multi Vitamin again and should she take Motrin or Advil.    Please contact patient

## 2023-04-14 LAB
FINAL PATHOLOGIC DIAGNOSIS: NORMAL
GROSS: NORMAL
Lab: NORMAL

## 2023-04-17 ENCOUNTER — TELEPHONE (OUTPATIENT)
Dept: OBSTETRICS AND GYNECOLOGY | Facility: CLINIC | Age: 53
End: 2023-04-17
Payer: COMMERCIAL

## 2023-04-18 NOTE — TELEPHONE ENCOUNTER
Called patient:    Aware of negative pathology from D&C / hysteroscopy.    1. Endometrial scraping:       -  Benign lower uterine segment, endocervical mucosa and cervical   squamous mucosa admixed with          blood and inflammatory mucin       -  Intact endometrial tissue is not present for evaluation       -  Negative for atypia, dysplasia or malignancy   2. Posterior endometrium, biopsy:       -  Proliferative endometrium with features of atrophy and adenomyosis       -  Negative for atypia or malignancy     Post-op visit 4/21/23

## 2023-04-21 ENCOUNTER — OFFICE VISIT (OUTPATIENT)
Dept: OBSTETRICS AND GYNECOLOGY | Facility: CLINIC | Age: 53
End: 2023-04-21
Attending: OBSTETRICS & GYNECOLOGY
Payer: COMMERCIAL

## 2023-04-21 ENCOUNTER — OFFICE VISIT (OUTPATIENT)
Dept: HEPATOLOGY | Facility: CLINIC | Age: 53
End: 2023-04-21
Payer: COMMERCIAL

## 2023-04-21 VITALS — WEIGHT: 183.19 LBS | BODY MASS INDEX: 34.58 KG/M2 | HEIGHT: 61 IN

## 2023-04-21 VITALS
DIASTOLIC BLOOD PRESSURE: 86 MMHG | SYSTOLIC BLOOD PRESSURE: 136 MMHG | HEIGHT: 61 IN | WEIGHT: 183.44 LBS | BODY MASS INDEX: 34.63 KG/M2

## 2023-04-21 DIAGNOSIS — Z98.890 POSTOPERATIVE STATE: Primary | ICD-10-CM

## 2023-04-21 DIAGNOSIS — E88.01 ALPHA-1-ANTITRYPSIN DEFICIENCY: ICD-10-CM

## 2023-04-21 DIAGNOSIS — E66.9 OBESITY (BMI 30.0-34.9): ICD-10-CM

## 2023-04-21 DIAGNOSIS — K74.00 LIVER FIBROSIS: ICD-10-CM

## 2023-04-21 DIAGNOSIS — R73.03 PRE-DIABETES: ICD-10-CM

## 2023-04-21 DIAGNOSIS — Z23 NEED FOR HEPATITIS A AND B VACCINATION: ICD-10-CM

## 2023-04-21 DIAGNOSIS — K76.0 FATTY LIVER: Primary | ICD-10-CM

## 2023-04-21 PROCEDURE — 99215 PR OFFICE/OUTPT VISIT, EST, LEVL V, 40-54 MIN: ICD-10-PCS | Mod: S$GLB,,, | Performed by: NURSE PRACTITIONER

## 2023-04-21 PROCEDURE — 3075F PR MOST RECENT SYSTOLIC BLOOD PRESS GE 130-139MM HG: ICD-10-PCS | Mod: CPTII,S$GLB,, | Performed by: OBSTETRICS & GYNECOLOGY

## 2023-04-21 PROCEDURE — 3079F DIAST BP 80-89 MM HG: CPT | Mod: CPTII,S$GLB,, | Performed by: OBSTETRICS & GYNECOLOGY

## 2023-04-21 PROCEDURE — 99999 PR PBB SHADOW E&M-EST. PATIENT-LVL IV: CPT | Mod: PBBFAC,,, | Performed by: NURSE PRACTITIONER

## 2023-04-21 PROCEDURE — 99024 POSTOP FOLLOW-UP VISIT: CPT | Mod: S$GLB,,, | Performed by: OBSTETRICS & GYNECOLOGY

## 2023-04-21 PROCEDURE — 1160F RVW MEDS BY RX/DR IN RCRD: CPT | Mod: CPTII,S$GLB,, | Performed by: OBSTETRICS & GYNECOLOGY

## 2023-04-21 PROCEDURE — 3075F SYST BP GE 130 - 139MM HG: CPT | Mod: CPTII,S$GLB,, | Performed by: OBSTETRICS & GYNECOLOGY

## 2023-04-21 PROCEDURE — 99215 OFFICE O/P EST HI 40 MIN: CPT | Mod: S$GLB,,, | Performed by: NURSE PRACTITIONER

## 2023-04-21 PROCEDURE — 3079F PR MOST RECENT DIASTOLIC BLOOD PRESSURE 80-89 MM HG: ICD-10-PCS | Mod: CPTII,S$GLB,, | Performed by: OBSTETRICS & GYNECOLOGY

## 2023-04-21 PROCEDURE — 99999 PR PBB SHADOW E&M-EST. PATIENT-LVL IV: CPT | Mod: PBBFAC,,, | Performed by: OBSTETRICS & GYNECOLOGY

## 2023-04-21 PROCEDURE — 1160F PR REVIEW ALL MEDS BY PRESCRIBER/CLIN PHARMACIST DOCUMENTED: ICD-10-PCS | Mod: CPTII,S$GLB,, | Performed by: NURSE PRACTITIONER

## 2023-04-21 PROCEDURE — 3008F BODY MASS INDEX DOCD: CPT | Mod: CPTII,S$GLB,, | Performed by: NURSE PRACTITIONER

## 2023-04-21 PROCEDURE — 99999 PR PBB SHADOW E&M-EST. PATIENT-LVL IV: ICD-10-PCS | Mod: PBBFAC,,, | Performed by: NURSE PRACTITIONER

## 2023-04-21 PROCEDURE — 99024 PR POST-OP FOLLOW-UP VISIT: ICD-10-PCS | Mod: S$GLB,,, | Performed by: OBSTETRICS & GYNECOLOGY

## 2023-04-21 PROCEDURE — 1159F PR MEDICATION LIST DOCUMENTED IN MEDICAL RECORD: ICD-10-PCS | Mod: CPTII,S$GLB,, | Performed by: OBSTETRICS & GYNECOLOGY

## 2023-04-21 PROCEDURE — 3008F PR BODY MASS INDEX (BMI) DOCUMENTED: ICD-10-PCS | Mod: CPTII,S$GLB,, | Performed by: NURSE PRACTITIONER

## 2023-04-21 PROCEDURE — 3008F BODY MASS INDEX DOCD: CPT | Mod: CPTII,S$GLB,, | Performed by: OBSTETRICS & GYNECOLOGY

## 2023-04-21 PROCEDURE — 1159F MED LIST DOCD IN RCRD: CPT | Mod: CPTII,S$GLB,, | Performed by: OBSTETRICS & GYNECOLOGY

## 2023-04-21 PROCEDURE — 1159F MED LIST DOCD IN RCRD: CPT | Mod: CPTII,S$GLB,, | Performed by: NURSE PRACTITIONER

## 2023-04-21 PROCEDURE — 99999 PR PBB SHADOW E&M-EST. PATIENT-LVL IV: ICD-10-PCS | Mod: PBBFAC,,, | Performed by: OBSTETRICS & GYNECOLOGY

## 2023-04-21 PROCEDURE — 1159F PR MEDICATION LIST DOCUMENTED IN MEDICAL RECORD: ICD-10-PCS | Mod: CPTII,S$GLB,, | Performed by: NURSE PRACTITIONER

## 2023-04-21 PROCEDURE — 3008F PR BODY MASS INDEX (BMI) DOCUMENTED: ICD-10-PCS | Mod: CPTII,S$GLB,, | Performed by: OBSTETRICS & GYNECOLOGY

## 2023-04-21 PROCEDURE — 1160F PR REVIEW ALL MEDS BY PRESCRIBER/CLIN PHARMACIST DOCUMENTED: ICD-10-PCS | Mod: CPTII,S$GLB,, | Performed by: OBSTETRICS & GYNECOLOGY

## 2023-04-21 PROCEDURE — 1160F RVW MEDS BY RX/DR IN RCRD: CPT | Mod: CPTII,S$GLB,, | Performed by: NURSE PRACTITIONER

## 2023-04-21 NOTE — PROGRESS NOTES
[facility] VISIT FOLLOW UP NOTE    PCP: Carlos Santiago MD     CHIEF COMPLAINT: fatty liver, alpha 1 antitrypsin deficiency, elevated liver enzymes, F2 fibrosis     HPI: This is a 52 y.o. White female with PMH noted below, presenting for follow up of above     Serological workup was negative for Geovanni's, hemochromatosis, autoimmune etiology, and viral hepatitis A, B and C.   alpha-1 antitrypsin deficiency - SS, level 90, needs pulm, reminded pt   PETH 150s  7/2022    Prior serologic workup:   Lab Results   Component Value Date    SMOOTHMUSCAB Negative 1:40 07/25/2022    AMAIFA Negative 1:40 07/25/2022    IGGSERUM 1166 07/25/2022    ANASCREEN Negative <1:80 07/25/2022    FERRITIN 247 07/25/2022    FESATURATED 11 (L) 07/25/2022    EERDA6KGDLFY SEE BELOW 07/27/2022    QIOED0WXSGUM 90 (L) 07/27/2022    CERULOPLSM 36.0 07/25/2022    HEPBSAG Negative 07/25/2022    HEPCAB Negative 07/25/2022    HEPAIGM Negative 06/07/2018     Risk factors for fatty liver include previous alcohol use, obesity, HLD, preDM    Liver fibrosis staging:  -- fibroscan 8/2022 noted F3, S3 (kPA 12.7, )    Interval HPI: Presents today with significant other. Stopped alcohol completely ~2/2023, previously drinking heavily (~1 bottle of wine nightly). Liver biopsy F2. Trying to follow lower carb, fat diet  Needs to increase exercise to most days of the week but doing a great job exercising at the gym currently a couple of times per week   Also alpha 1 antitrypsin, no lung s/s currently - has not gone to pulm yet, reminded to schedule appt   No SSB    Labs done 3/2023 show normal transaminase levels (ALT = AST, elevated since 2017)  Platelets WNL, alk phos WNL  Synthetic liver functioning WNL    Lab Results   Component Value Date    ALT 44 03/15/2023    AST 33 03/15/2023    ALKPHOS 120 03/15/2023    BILITOT 0.3 03/15/2023    ALBUMIN 4.3 03/15/2023    INR 1.0 03/15/2023     03/15/2023     Abd U/S done 7/2022 showed  "hepatomegaly, fatty liver, focal fatty sparing, no splenomegaly    Denies family history of liver disease . + previously heavy Alcohol consumption, see below  Social History     Substance and Sexual Activity   Alcohol Use Not Currently    Comment: ~1 bottle of wine nightly x years, stopped completed 2/2023       Immunity to Hep A and B - needs TwinRix, sent to [facility] pharm and ID         Allergy and medication list reviewed and updated     PMHX:  has a past medical history of Allergy, Alpha-1-antitrypsin deficiency (07/25/2022), Anxiety, Constipation, Depression, Hyperlipidemia, Melanoma (01/26/2015), Melanoma of scalp (01/12/2015), Open wound of scalp, complicated (02/06/2015), Other seasonal allergic rhinitis, Pre-diabetes (07/25/2022), Thickened endometrium (06/26/2020), and Uterine fibroid (06/2020).    PSHX:  has a past surgical history that includes Skin surgery; Appendectomy; Tonsillectomy; Eye surgery; WLE scalp melanoma (01/26/2015); Gallbladder surgery (02/2017); Scalp Reconstruction; Esophagogastroduodenoscopy (N/A, 06/02/2020); Colonoscopy (N/A, 06/02/2020); Cholecystectomy; Hysteroscopy with dilation and curettage of uterus (N/A, 4/4/2023); Liver biopsy (N/A, 4/4/2023); and Needle localization (N/A, 4/4/2023).    FAMILY HISTORY: Updated and reviewed in [facility]    SOCIAL HISTORY:   Social History     Substance and Sexual Activity   Alcohol Use Yes    Alcohol/week: 4.0 standard drinks    Types: 4 Glasses of wine per week    Comment: ~1 bottle of wine nightly x years, stopped 7/2022       Social History     Substance and Sexual Activity   Drug Use Yes    Types: Marijuana    Comment: medical thc gummy -daily       ROS:   GENERAL: Denies fatigue  CARDIOVASCULAR: Denies edema  GI: Denies abdominal pain  SKIN: Denies rash, itching   NEURO: Denies confusion, memory loss, or mood changes    PHYSICAL EXAM:   Friendly White female, in no acute distress; alert and oriented to person, place and time  VITALS: Ht 5' 1" " (1.549 m)   Wt 83.1 kg (183 lb 3.2 oz)   LMP 10/12/2018 (Approximate)   BMI 34.62 kg/m²   EYES: Sclerae anicteric  GI: Soft, non-tender, non-distended. No ascites.  EXTREMITIES:  No edema.  SKIN: Warm and dry. No jaundice. No telangectasias noted. No palmar erythema.  NEURO:  No asterixis.  PSYCH:  Thought and speech pattern appropriate. Behavior normal      EDUCATION:  See instructions discussed with patient in Instructions section of the After Visit Summary     ASSESSMENT & PLAN:  52 y.o. White female with:  1.  Liver fibrosis  -- F2 on biopsy, reiterated importance of NO alcohol and 20 lb weight loss to prevent progression to cirrhosis     2. Fatty liver, likely related to alcohol + metabolic risk factors   - see HPI  -- Immunity to Hep A and B : see HPI  -- Recommendations discussed with patient:  Limit alcohol consumption, none given liver scar tissue  2 Weight loss goal of 20 lbs  3. Low carb/sugar, high fiber and protein diet, limit your carb intake to LESS than 30-45 grams of carbs with a meal or LESS than 5-10 grams with any snack   4. Exercise, 5 days per week, 30 minutes per day, as tolerated  5. Recommend good cholesterol, blood pressure, blood sugar levels     3. Alpha 1 antitrypsin deficiency   -- previously referred to pulm, given pt phone number to call and schedule appt     4. Obesity, HLD, preDM   -- Body mass index is 34.62 kg/m².   -- increases risk for fatty liver            Follow up in about 6 months (around 10/21/2023). With US and labs a few days before    Orders Placed This Encounter   Procedures    US Abdomen Complete    Hepatitis A / Hepatitis B Combined Vaccine (IM)    Hepatic Function Panel    Phosphatidylethanol (PETH)    AFP Tumor Marker        Thank you for allowing me to participate in the care of Isela Grant BABAR Thompson    I spent a total of 40 minutes on the day of the visit.This includes face to face time and non-face to face time preparing to see the  patient (eg, review of tests), obtaining and/or reviewing separately obtained history, documenting clinical information in the electronic or other health record, independently interpreting results and communicating results to the patient/family/caregiver, and coordinating care.         CC'ed note to:   Carlos Santiago MD

## 2023-04-21 NOTE — PATIENT INSTRUCTIONS
1. Biopsy showed fatty liver, inflammation related to fatty liver (steatohepatitis) and stage 2 scar tissue (half way to cirrhosis)  2. Pulmonology referral for alpha 1 antitrypsin [phone]  3. Recommend vaccines for Hepatitis  A/B, see below   4. Follow up in 6 months with US and labs a few days before    There is no FDA approved therapy for fatty liver disease. Therefore, these things are important:  Limit alcohol consumption, NONE given scar tissue in the liver   2 Weight loss goal of 15-20 lbs, referral for [facility] if interested. Also, if interested in a dietician visit to create a weight loss plan, contact the dietician team at [facility] at [email or web address] to schedule a visit to you can call [facility] in [place]: [phone] and the  will transfer the call to one of the dieticians to schedule an appointment. Or you can also call [phone] to schedule. They do offer video visits   3. Low carb/sugar and high protein diet (~1 g/kg/day).Try to limit your carb intake to LESS than 30-45 grams of carbs with a meal or LESS than 5-10 grams with any snack (total of any snack foods eaten during that time). Use [employer] Pal or Lose It tye to add up your carbs through the day. Do NOT drink any beverages with calories or carbs (this can lead to high blood sugar and weight gain). Also, some of our patients have been very successful with weight loss using the pre made/planned meal planning services that limit calories and portion size ( The main thing to look for is low calorie, high protein, low carb)  4. Exercise, 5 days per week, 30 minutes per day, as tolerated  5. Recommend good cholesterol, blood pressure, blood sugar levels     In some people, fatty liver can progress to steatohepatitis (inflamatory fatty liver) and possibly to cirrhosis, increasing the risk for liver cancer, liver failure, and death. Therefore, the lifestyle changes are very important to  decrease this risk.     Website with information about fatty liver and inflammation related to fatty liver (ESPINAL) = www.nashtruth.com  AND www.NASHactually.com        HEP A/B VACCINE  Your immunity markers show that you do NOT have immunity against Hepatitis A or B, so I recommend that you receive the combination Hepatitis A and B vaccine called TwinRix. This will protect your liver from these viruses, which can make your liver very sick.     Hep A can be transmitted through food and water and can cause significant liver injury. There was previously a significant Hepatitis A outbreak in [place]. Hep B vaccine is transmitted through blood or bodily fluids (there are no symptoms typically) and can develop longstanding (chronic) Hep B and there is no cure, so many people have it for life. Therefore, the vaccines provide immunity against these viruses that can cause harm to the liver.     The vaccine series is 3 vaccines: one now, one at 4 weeks and one 6 months after the 1st one. I sent the vaccine to the [facility]. I recommend calling them in a couple of days to see if the vaccine is covered by your insurance and arrange the vaccines if they are covered. Their number is [phone].      If the vaccine is not covered at the pharmacy level, I sent an order that you can get the vaccine in the infectious disease department at [facility] If that is the case, please call [phone]  to schedule your vaccine administration appointment in the infectious disease department.  If you need to proceed with vaccines with the infectious disease department, you can call to obtain a cost for these vaccines before you proceed, you can call the [facility] office at [phone] or [phone]

## 2023-04-26 ENCOUNTER — PATIENT MESSAGE (OUTPATIENT)
Dept: DERMATOLOGY | Facility: CLINIC | Age: 53
End: 2023-04-26
Payer: COMMERCIAL

## 2023-06-06 ENCOUNTER — OFFICE VISIT (OUTPATIENT)
Dept: PULMONOLOGY | Facility: CLINIC | Age: 53
End: 2023-06-06
Payer: COMMERCIAL

## 2023-06-06 ENCOUNTER — HOSPITAL ENCOUNTER (OUTPATIENT)
Dept: PULMONOLOGY | Facility: CLINIC | Age: 53
Discharge: HOME | End: 2023-06-06
Payer: COMMERCIAL

## 2023-06-06 VITALS
BODY MASS INDEX: 33.59 KG/M2 | DIASTOLIC BLOOD PRESSURE: 80 MMHG | HEART RATE: 91 BPM | SYSTOLIC BLOOD PRESSURE: 130 MMHG | OXYGEN SATURATION: 95 % | WEIGHT: 177.94 LBS | HEIGHT: 61 IN

## 2023-06-06 DIAGNOSIS — E88.01 ALPHA-1-ANTITRYPSIN DEFICIENCY: ICD-10-CM

## 2023-06-06 DIAGNOSIS — J45.20 MILD INTERMITTENT EXTRINSIC ASTHMA WITHOUT COMPLICATION: ICD-10-CM

## 2023-06-06 DIAGNOSIS — J30.2 SEASONAL ALLERGIC RHINITIS, UNSPECIFIED TRIGGER: ICD-10-CM

## 2023-06-06 DIAGNOSIS — J84.9 INTERSTITIAL PULMONARY DISEASE, UNSPECIFIED: Primary | ICD-10-CM

## 2023-06-06 DIAGNOSIS — J30.89 NON-SEASONAL ALLERGIC RHINITIS DUE TO OTHER ALLERGIC TRIGGER: ICD-10-CM

## 2023-06-06 PROCEDURE — 99999 PR PBB SHADOW E&M-EST. PATIENT-LVL V: ICD-10-PCS | Mod: PBBFAC,,, | Performed by: INTERNAL MEDICINE

## 2023-06-06 PROCEDURE — 1159F PR MEDICATION LIST DOCUMENTED IN MEDICAL RECORD: ICD-10-PCS | Mod: CPTII,S$GLB,, | Performed by: INTERNAL MEDICINE

## 2023-06-06 PROCEDURE — 94727 GAS DIL/WSHOT DETER LNG VOL: CPT | Mod: S$GLB,,, | Performed by: INTERNAL MEDICINE

## 2023-06-06 PROCEDURE — 3008F BODY MASS INDEX DOCD: CPT | Mod: CPTII,S$GLB,, | Performed by: INTERNAL MEDICINE

## 2023-06-06 PROCEDURE — 99204 OFFICE O/P NEW MOD 45 MIN: CPT | Mod: 25,S$GLB,, | Performed by: INTERNAL MEDICINE

## 2023-06-06 PROCEDURE — 1159F MED LIST DOCD IN RCRD: CPT | Mod: CPTII,S$GLB,, | Performed by: INTERNAL MEDICINE

## 2023-06-06 PROCEDURE — 94727 PR PULM FUNCTION TEST BY GAS: ICD-10-PCS | Mod: S$GLB,,, | Performed by: INTERNAL MEDICINE

## 2023-06-06 PROCEDURE — 3079F DIAST BP 80-89 MM HG: CPT | Mod: CPTII,S$GLB,, | Performed by: INTERNAL MEDICINE

## 2023-06-06 PROCEDURE — 94729 DIFFUSING CAPACITY: CPT | Mod: S$GLB,,, | Performed by: INTERNAL MEDICINE

## 2023-06-06 PROCEDURE — 94060 PR EVAL OF BRONCHOSPASM: ICD-10-PCS | Mod: S$GLB,,, | Performed by: INTERNAL MEDICINE

## 2023-06-06 PROCEDURE — 94729 PR C02/MEMBANE DIFFUSE CAPACITY: ICD-10-PCS | Mod: S$GLB,,, | Performed by: INTERNAL MEDICINE

## 2023-06-06 PROCEDURE — 99204 PR OFFICE/OUTPT VISIT, NEW, LEVL IV, 45-59 MIN: ICD-10-PCS | Mod: 25,S$GLB,, | Performed by: INTERNAL MEDICINE

## 2023-06-06 PROCEDURE — 3079F PR MOST RECENT DIASTOLIC BLOOD PRESSURE 80-89 MM HG: ICD-10-PCS | Mod: CPTII,S$GLB,, | Performed by: INTERNAL MEDICINE

## 2023-06-06 PROCEDURE — 3075F PR MOST RECENT SYSTOLIC BLOOD PRESS GE 130-139MM HG: ICD-10-PCS | Mod: CPTII,S$GLB,, | Performed by: INTERNAL MEDICINE

## 2023-06-06 PROCEDURE — 3008F PR BODY MASS INDEX (BMI) DOCUMENTED: ICD-10-PCS | Mod: CPTII,S$GLB,, | Performed by: INTERNAL MEDICINE

## 2023-06-06 PROCEDURE — 99999 PR PBB SHADOW E&M-EST. PATIENT-LVL V: CPT | Mod: PBBFAC,,, | Performed by: INTERNAL MEDICINE

## 2023-06-06 PROCEDURE — 94060 EVALUATION OF WHEEZING: CPT | Mod: S$GLB,,, | Performed by: INTERNAL MEDICINE

## 2023-06-06 PROCEDURE — 3075F SYST BP GE 130 - 139MM HG: CPT | Mod: CPTII,S$GLB,, | Performed by: INTERNAL MEDICINE

## 2023-06-06 RX ORDER — AZELASTINE 1 MG/ML
1 SPRAY, METERED NASAL 2 TIMES DAILY
Qty: 30 ML | Refills: 3 | Status: SHIPPED | OUTPATIENT
Start: 2023-06-06 | End: 2024-06-05

## 2023-06-06 NOTE — PROGRESS NOTES
"Subjective:     Reason for visit: Alpha-1 AT deficiency     Patient ID:  Isela Graf is a 52 y.o. female    History:  51 yo with recently diagnosed alpha-1 anti-trypsin deficiency that was found in the process of her liver disease work up. She has no current shortness of breath but does have intermittent cough and issues with her allergies. She was previously told that she had asthma but hasn't been on any treatment in years. Her parents are both still alive in their late 70s with no lung issues. She has a sibling that has asthma but no other lung diseases in the family.   Her  is with her and assisting with history and background.       Additional Pulmonary History:  Childhood Illnesses:  none  Occupational/Environmental:  none (worked in a )  Tobacco/Smoking:  never smoker    Objective:     Vitals:    06/06/23 0922   BP: 130/80   BP Location: Right arm   Patient Position: Sitting   Pulse: 91   SpO2: 95%   Weight: 80.7 kg (177 lb 14.6 oz)   Height: 5' 1" (1.549 m)         Physical Exam  Constitutional:       General: She is not in acute distress.     Appearance: She is not diaphoretic.   HENT:      Head: Normocephalic and atraumatic.      Right Ear: External ear normal.      Left Ear: External ear normal.   Eyes:      Conjunctiva/sclera: Conjunctivae normal.      Pupils: Pupils are equal, round, and reactive to light.   Neck:      Trachea: No tracheal deviation.   Cardiovascular:      Rate and Rhythm: Normal rate and regular rhythm.      Heart sounds: Normal heart sounds. No murmur heard.  Pulmonary:      Effort: Pulmonary effort is normal. No respiratory distress.      Breath sounds: Normal breath sounds. No stridor. No wheezing or rales.   Abdominal:      General: Bowel sounds are normal. There is no distension.      Palpations: Abdomen is soft.      Tenderness: There is no abdominal tenderness.   Musculoskeletal:         General: Normal range of motion.      Cervical back: Normal range " of motion and neck supple.   Skin:     General: Skin is warm and dry.      Findings: No erythema.   Neurological:      Mental Status: She is alert and oriented to person, place, and time.      Gait: Gait is intact.   Psychiatric:         Mood and Affect: Mood and affect normal.         Cognition and Memory: Memory normal.         Judgment: Judgment normal.                Assessment & Plan:       Problem List Items Addressed This Visit          ENT    Seasonal allergic rhinitis    Current Assessment & Plan     Continue flonase and adding azelastine  PRN oral allergy medications            Pulmonary    Extrinsic asthma without complication    Overview     Previous dx of asthma and allergies  - PFTs  - encouraged her to take allergy medication as needed.   - adding azelastine for allergic rhinitis           Alpha-1-antitrypsin deficiency    Overview     SS mutation with decreased serum levels of the enzyme. This mutation does not increase your risk for emphysema   - no previous pulmonary work up  - PFTs   - CT chest   - if both are normal, no need for further follow up.          Relevant Orders    Spirometry with/without bronchodilator (Completed)    DLCO-Carbon Monoxide Diffusing Capacity    Lung Volumes     Other Visit Diagnoses       Interstitial pulmonary disease, unspecified    -  Primary    Relevant Orders    CT Chest Without Contrast    Non-seasonal allergic rhinitis due to other allergic trigger        Relevant Medications    azelastine (ASTELIN) 137 mcg (0.1 %) nasal spray             Portions of the record may have been created with voice-recognition software. Occasional wrong-word or sound-a-like substitutions may have occurred due to the inherent limitations of voice-recognition software. Read the chart carefully and recognize, using context, where substitutions have occurred.

## 2023-06-22 ENCOUNTER — HOSPITAL ENCOUNTER (OUTPATIENT)
Dept: RADIOLOGY | Facility: HOSPITAL | Age: 53
Discharge: HOME | End: 2023-06-22
Attending: INTERNAL MEDICINE
Payer: COMMERCIAL

## 2023-06-22 DIAGNOSIS — J84.9 INTERSTITIAL PULMONARY DISEASE, UNSPECIFIED: ICD-10-CM

## 2023-06-22 PROCEDURE — 71250 CT THORAX DX C-: CPT | Mod: TC,PO

## 2023-06-22 PROCEDURE — 71250 CT CHEST WITHOUT CONTRAST: ICD-10-PCS | Mod: 26,,, | Performed by: RADIOLOGY

## 2023-06-22 PROCEDURE — 71250 CT THORAX DX C-: CPT | Mod: 26,,, | Performed by: RADIOLOGY

## 2023-06-27 ENCOUNTER — PATIENT MESSAGE (OUTPATIENT)
Dept: PULMONOLOGY | Facility: OTHER | Age: 53
End: 2023-06-27
Payer: COMMERCIAL

## 2023-07-17 NOTE — TELEPHONE ENCOUNTER
Called patient. No answer. Left voice message for patient to call the office.      Surgery needs to be scheduled.    Low Dose Naltrexone Counseling- I discussed with the patient the potential risks and side effects of low dose naltrexone including but not limited to: more vivid dreams, headaches, nausea, vomiting, abdominal pain, fatigue, dizziness, and anxiety.

## 2023-07-19 NOTE — TELEPHONE ENCOUNTER
No care due was identified.  [facility] Embedded Care Due Messages. Reference number: [id number].   7/19/2023 12:12:59 PM CDT

## 2023-07-22 RX ORDER — TIZANIDINE 4 MG/1
4 TABLET ORAL EVERY 8 HOURS
Qty: 30 TABLET | Refills: 35 | Status: SHIPPED | OUTPATIENT
Start: 2023-07-22 | End: 2023-11-30 | Stop reason: SDUPTHER

## 2023-07-22 RX ORDER — ROSUVASTATIN CALCIUM 20 MG/1
20 TABLET, COATED ORAL NIGHTLY
Qty: 30 TABLET | Refills: 3 | Status: SHIPPED | OUTPATIENT
Start: 2023-07-22 | End: 2023-11-30 | Stop reason: SDUPTHER

## 2023-07-22 RX ORDER — CITALOPRAM 10 MG/1
10 TABLET ORAL NIGHTLY
Qty: 30 TABLET | Refills: 3 | Status: SHIPPED | OUTPATIENT
Start: 2023-07-22 | End: 2023-11-30 | Stop reason: SDUPTHER

## 2023-07-22 RX ORDER — HYDROGEN PEROXIDE 3 %
20 SOLUTION, NON-ORAL MISCELLANEOUS
Qty: 30 CAPSULE | Refills: 3 | Status: SHIPPED | OUTPATIENT
Start: 2023-07-22 | End: 2023-11-30 | Stop reason: SDUPTHER

## 2023-07-23 ENCOUNTER — HOSPITAL ENCOUNTER (EMERGENCY)
Facility: HOSPITAL | Age: 53
Discharge: HOME | End: 2023-07-23
Attending: FAMILY MEDICINE
Payer: COMMERCIAL

## 2023-07-23 VITALS
WEIGHT: 165 LBS | DIASTOLIC BLOOD PRESSURE: 77 MMHG | RESPIRATION RATE: 20 BRPM | TEMPERATURE: 99 F | BODY MASS INDEX: 31.15 KG/M2 | HEART RATE: 90 BPM | OXYGEN SATURATION: 98 % | SYSTOLIC BLOOD PRESSURE: 147 MMHG | HEIGHT: 61 IN
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
## 2023-07-23 DIAGNOSIS — M54.50 ACUTE BILATERAL LOW BACK PAIN WITHOUT SCIATICA: ICD-10-CM

## 2023-07-23 PROCEDURE — 99282 EMERGENCY DEPT VISIT SF MDM: CPT | Mod: ER
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
## 2023-08-24 ENCOUNTER — OFFICE VISIT (OUTPATIENT)
Dept: FAMILY MEDICINE | Facility: CLINIC | Age: 53
End: 2023-08-24
Payer: COMMERCIAL

## 2023-08-24 VITALS
SYSTOLIC BLOOD PRESSURE: 138 MMHG | HEIGHT: 61 IN | TEMPERATURE: 98 F | WEIGHT: 181.69 LBS | DIASTOLIC BLOOD PRESSURE: 60 MMHG | BODY MASS INDEX: 34.3 KG/M2 | HEART RATE: 112 BPM

## 2023-08-24 DIAGNOSIS — E66.01 SEVERE OBESITY (BMI 35.0-39.9) WITH COMORBIDITY: ICD-10-CM

## 2023-08-24 DIAGNOSIS — Z00.00 ANNUAL PHYSICAL EXAM: Primary | ICD-10-CM

## 2023-08-24 PROCEDURE — 1159F MED LIST DOCD IN RCRD: CPT | Mod: CPTII,S$GLB,, | Performed by: FAMILY MEDICINE

## 2023-08-24 PROCEDURE — 3078F DIAST BP <80 MM HG: CPT | Mod: CPTII,S$GLB,, | Performed by: FAMILY MEDICINE

## 2023-08-24 PROCEDURE — 1160F RVW MEDS BY RX/DR IN RCRD: CPT | Mod: CPTII,S$GLB,, | Performed by: FAMILY MEDICINE

## 2023-08-24 PROCEDURE — 3075F SYST BP GE 130 - 139MM HG: CPT | Mod: CPTII,S$GLB,, | Performed by: FAMILY MEDICINE

## 2023-08-24 PROCEDURE — 1160F PR REVIEW ALL MEDS BY PRESCRIBER/CLIN PHARMACIST DOCUMENTED: ICD-10-PCS | Mod: CPTII,S$GLB,, | Performed by: FAMILY MEDICINE

## 2023-08-24 PROCEDURE — 3078F PR MOST RECENT DIASTOLIC BLOOD PRESSURE < 80 MM HG: ICD-10-PCS | Mod: CPTII,S$GLB,, | Performed by: FAMILY MEDICINE

## 2023-08-24 PROCEDURE — 99396 PREV VISIT EST AGE 40-64: CPT | Mod: S$GLB,,, | Performed by: FAMILY MEDICINE

## 2023-08-24 PROCEDURE — 3008F PR BODY MASS INDEX (BMI) DOCUMENTED: ICD-10-PCS | Mod: CPTII,S$GLB,, | Performed by: FAMILY MEDICINE

## 2023-08-24 PROCEDURE — 3075F PR MOST RECENT SYSTOLIC BLOOD PRESS GE 130-139MM HG: ICD-10-PCS | Mod: CPTII,S$GLB,, | Performed by: FAMILY MEDICINE

## 2023-08-24 PROCEDURE — 1159F PR MEDICATION LIST DOCUMENTED IN MEDICAL RECORD: ICD-10-PCS | Mod: CPTII,S$GLB,, | Performed by: FAMILY MEDICINE

## 2023-08-24 PROCEDURE — 99396 PR PREVENTIVE VISIT,EST,40-64: ICD-10-PCS | Mod: S$GLB,,, | Performed by: FAMILY MEDICINE

## 2023-08-24 PROCEDURE — 3008F BODY MASS INDEX DOCD: CPT | Mod: CPTII,S$GLB,, | Performed by: FAMILY MEDICINE

## 2023-08-30 ENCOUNTER — PATIENT MESSAGE (OUTPATIENT)
Dept: PULMONOLOGY | Facility: CLINIC | Age: 53
End: 2023-08-30
Payer: COMMERCIAL

## 2023-08-31 ENCOUNTER — TELEPHONE (OUTPATIENT)
Dept: DERMATOLOGY | Facility: CLINIC | Age: 53
End: 2023-08-31
Payer: COMMERCIAL

## 2023-08-31 NOTE — TELEPHONE ENCOUNTER
I left a voice mail stating the purpose of my call is to assist with scheduling an appointment with Dr. Sol.  I left the clinic's number and suggested sending us a message with her preferred date and time.

## 2023-09-01 ENCOUNTER — TELEPHONE (OUTPATIENT)
Dept: DERMATOLOGY | Facility: CLINIC | Age: 53
End: 2023-09-01
Payer: COMMERCIAL

## 2023-09-06 ENCOUNTER — TELEPHONE (OUTPATIENT)
Dept: DERMATOLOGY | Facility: CLINIC | Age: 53
End: 2023-09-06
Payer: COMMERCIAL

## 2023-09-07 ENCOUNTER — TELEPHONE (OUTPATIENT)
Dept: DERMATOLOGY | Facility: CLINIC | Age: 53
End: 2023-09-07
Payer: COMMERCIAL

## 2023-09-11 ENCOUNTER — PATIENT MESSAGE (OUTPATIENT)
Dept: ADMINISTRATIVE | Facility: HOSPITAL | Age: 53
End: 2023-09-11
Payer: COMMERCIAL

## 2023-09-22 ENCOUNTER — TELEPHONE (OUTPATIENT)
Dept: DERMATOLOGY | Facility: CLINIC | Age: 53
End: 2023-09-22
Payer: COMMERCIAL

## 2023-10-19 ENCOUNTER — HOSPITAL ENCOUNTER (OUTPATIENT)
Dept: RADIOLOGY | Facility: HOSPITAL | Age: 53
Discharge: HOME | End: 2023-10-19
Attending: NURSE PRACTITIONER
Payer: COMMERCIAL

## 2023-10-19 DIAGNOSIS — K76.0 FATTY LIVER: ICD-10-CM

## 2023-10-19 DIAGNOSIS — K74.00 LIVER FIBROSIS: ICD-10-CM

## 2023-10-19 PROCEDURE — 76700 US EXAM ABDOM COMPLETE: CPT | Mod: TC,PN

## 2023-10-19 PROCEDURE — 76700 US EXAM ABDOM COMPLETE: CPT | Mod: 26,,, | Performed by: RADIOLOGY

## 2023-10-19 PROCEDURE — 76700 US ABDOMEN COMPLETE: ICD-10-PCS | Mod: 26,,, | Performed by: RADIOLOGY

## 2023-10-23 ENCOUNTER — PATIENT MESSAGE (OUTPATIENT)
Dept: HEPATOLOGY | Facility: CLINIC | Age: 53
End: 2023-10-23

## 2023-10-23 ENCOUNTER — OFFICE VISIT (OUTPATIENT)
Dept: HEPATOLOGY | Facility: CLINIC | Age: 53
End: 2023-10-23
Payer: COMMERCIAL

## 2023-10-23 VITALS — WEIGHT: 170 LBS | BODY MASS INDEX: 32.1 KG/M2 | HEIGHT: 61 IN

## 2023-10-23 DIAGNOSIS — E88.01 ALPHA-1-ANTITRYPSIN DEFICIENCY: ICD-10-CM

## 2023-10-23 DIAGNOSIS — K74.00 LIVER FIBROSIS: ICD-10-CM

## 2023-10-23 DIAGNOSIS — R73.03 PRE-DIABETES: ICD-10-CM

## 2023-10-23 DIAGNOSIS — K76.0 FATTY LIVER: Primary | ICD-10-CM

## 2023-10-23 DIAGNOSIS — E66.9 OBESITY (BMI 30.0-34.9): ICD-10-CM

## 2023-10-23 DIAGNOSIS — E78.5 HYPERLIPIDEMIA, UNSPECIFIED HYPERLIPIDEMIA TYPE: ICD-10-CM

## 2023-10-23 PROCEDURE — 3008F BODY MASS INDEX DOCD: CPT | Mod: CPTII,95,, | Performed by: NURSE PRACTITIONER

## 2023-10-23 PROCEDURE — 99214 OFFICE O/P EST MOD 30 MIN: CPT | Mod: 95,,, | Performed by: NURSE PRACTITIONER

## 2023-10-23 PROCEDURE — 1159F MED LIST DOCD IN RCRD: CPT | Mod: CPTII,95,, | Performed by: NURSE PRACTITIONER

## 2023-10-23 PROCEDURE — 1159F PR MEDICATION LIST DOCUMENTED IN MEDICAL RECORD: ICD-10-PCS | Mod: CPTII,95,, | Performed by: NURSE PRACTITIONER

## 2023-10-23 PROCEDURE — 1160F PR REVIEW ALL MEDS BY PRESCRIBER/CLIN PHARMACIST DOCUMENTED: ICD-10-PCS | Mod: CPTII,95,, | Performed by: NURSE PRACTITIONER

## 2023-10-23 PROCEDURE — 1160F RVW MEDS BY RX/DR IN RCRD: CPT | Mod: CPTII,95,, | Performed by: NURSE PRACTITIONER

## 2023-10-23 PROCEDURE — 3008F PR BODY MASS INDEX (BMI) DOCUMENTED: ICD-10-PCS | Mod: CPTII,95,, | Performed by: NURSE PRACTITIONER

## 2023-10-23 PROCEDURE — 99214 PR OFFICE/OUTPT VISIT, EST, LEVL IV, 30-39 MIN: ICD-10-PCS | Mod: 95,,, | Performed by: NURSE PRACTITIONER

## 2023-10-23 NOTE — PROGRESS NOTES
The patient location is: [place]  The chief complaint leading to consultation is: see below     Visit type: audiovisual    Face to Face time with patient: 30 minutes of total time spent on the encounter, which includes face to face time and non-face to face time preparing to see the patient (eg, review of tests), Obtaining and/or reviewing separately obtained history, Documenting clinical information in the electronic or other health record, Independently interpreting results (not separately reported) and communicating results to the patient/family/caregiver, or Care coordination (not separately reported).         Each patient to whom he or she provides medical services by telemedicine is:  (1) informed of the relationship between the physician and patient and the respective role of any other health care provider with respect to management of the patient; and (2) notified that he or she may decline to receive medical services by telemedicine and may withdraw from such care at any time.    Notes:      [facility] VISIT FOLLOW UP NOTE    PCP: Carlos Santiago MD     CHIEF COMPLAINT: fatty liver, alpha 1 antitrypsin deficiency, elevated liver enzymes, F2 fibrosis     HPI: This is a 53 y.o. White female with PMH noted below, presenting for follow up of above     Serological workup was negative for Geovanni's, hemochromatosis, autoimmune etiology, and viral hepatitis A, B and C.   alpha-1 antitrypsin deficiency - SS, level 90, saw pulm    Prior serologic workup:   Lab Results   Component Value Date    SMOOTHMUSCAB Negative 1:40 07/25/2022    AMAIFA Negative 1:40 07/25/2022    IGGSERUM 1166 07/25/2022    ANASCREEN Negative <1:80 07/25/2022    FERRITIN 247 07/25/2022    FESATURATED 11 (L) 07/25/2022    IPVAW6YWXOHE SEE BELOW 07/27/2022    TAHLI8FBLJUS 90 (L) 07/27/2022    CERULOPLSM 36.0 07/25/2022    HEPBSAG Negative 07/25/2022    HEPCAB Negative 07/25/2022    HEPAIGM Negative 06/07/2018     Risk factors for fatty  liver include previously regular alcohol use, obesity, HLD, preDM    Liver fibrosis staging:  -- fibroscan 8/2022 noted F3, S3 (kPA 12.7, )  -- liver biopsy 4/2023 noted fatty liver, F2 fibrosis   -- MRI elasto before RTC    Interval HPI: Presents today with significant other via video visit.  Stopped alcohol completely ~2/2023, previously drinking heavily (~1 bottle of wine nightly). Did resume some rare use in the last couple of months  Liver biopsy in April noted F2 fibrosis   Doing great with lifestyle/diet: lost 20 lbs, following intermittent fasting and trying to follow lower carb, smaller portions  Exercising at the gym   No SSB  Current wt 170s    Labs done 10/2023 show normal transaminase levels (ALT = AST, elevated since 2017)  Platelets WNL, alk phos WNL  Synthetic liver functioning WNL    Lab Results   Component Value Date    ALT 31 10/19/2023    AST 32 10/19/2023    ALKPHOS 94 10/19/2023    BILITOT 0.3 10/19/2023    ALBUMIN 4.9 10/19/2023    INR 1.0 03/15/2023     03/15/2023     Abd U/S done 10/2023 showed hepatomegaly, fatty liver, no splenomegaly    Denies family history of liver disease . + previously heavy Alcohol consumption, see below  Social History     Substance and Sexual Activity   Alcohol Use Not Currently    Comment: ~1 bottle of wine nightly x years, stopped completed 2/2023, but resumed some rare use since       Immunity to Hep A and B - needs to complete the [employer] series, will repeat titers          Allergy and medication list reviewed and updated     PMHX:  has a past medical history of Allergy, Alpha-1-antitrypsin deficiency (07/25/2022), Anxiety, Constipation, Depression, Hyperlipidemia, Liver fibrosis (8/10/2022), Melanoma (01/26/2015), Melanoma of scalp (01/12/2015), Open wound of scalp, complicated (02/06/2015), Other seasonal allergic rhinitis, Pre-diabetes (07/25/2022), Thickened endometrium (06/26/2020), and Uterine fibroid (06/2020).    PSHX:  has a past surgical  "history that includes Skin surgery; Appendectomy; Tonsillectomy; Eye surgery; WLE scalp melanoma (01/26/2015); Gallbladder surgery (02/2017); Scalp Reconstruction; Esophagogastroduodenoscopy (N/A, 06/02/2020); Colonoscopy (N/A, 06/02/2020); Cholecystectomy; Hysteroscopy with dilation and curettage of uterus (N/A, 4/4/2023); Liver biopsy (N/A, 4/4/2023); and Needle localization (N/A, 4/4/2023).    FAMILY HISTORY: Updated and reviewed in [facility]    SOCIAL HISTORY:   Social History     Substance and Sexual Activity   Alcohol Use Not Currently    Comment: ~1 bottle of wine nightly x years, stopped completed 2/2023       Social History     Substance and Sexual Activity   Drug Use Yes    Types: Marijuana    Comment: medical thc gummy -daily       ROS:   GENERAL: Denies fatigue  CARDIOVASCULAR: Denies edema  GI: Denies abdominal pain  SKIN: Denies rash, itching   NEURO: Denies confusion, memory loss, or mood changes    PHYSICAL EXAM:   Friendly White female, in no acute distress; alert and oriented to person, place and time  VITALS: Ht 5' 1" (1.549 m)   Wt 77.1 kg (170 lb)   LMP 10/12/2018 (Approximate)   BMI 32.12 kg/m²   EYES: Sclerae anicteric  GI: Soft, non-tender, non-distended. No ascites.  EXTREMITIES:  No edema.  SKIN: Warm and dry. No jaundice. No telangectasias noted. No palmar erythema.  NEURO:  No asterixis.  PSYCH:  Thought and speech pattern appropriate. Behavior normal      EDUCATION:  See instructions discussed with patient in Instructions section of the After Visit Summary     ASSESSMENT & PLAN:  53 y.o. White female with:  1.  Liver fibrosis  -- F2 on biopsy, reiterated importance of NO alcohol and continue weight loss to prevent progression to cirrhosis, pt agrees to avoid alcohol completely   -- fibroscan previously inaccurate compared to liver biospy, so will obtain MRI elasto in 1 year to reassess fibrosis     2. Fatty liver, likely related to metabolic risk factors +/- previous alcohol use   - see " HPI  -- Immunity to Hep A and B : see HPI  -- Recommendations discussed with patient:  Limit alcohol consumption, none given liver scar tissue  2 Continue weight loss   3. Low carb/sugar, high fiber and protein diet, limit your carb intake to LESS than 30-45 grams of carbs with a meal or LESS than 5-10 grams with any snack   4. Exercise, 5 days per week, 30 minutes per day, as tolerated  5. Recommend good cholesterol, blood pressure, blood sugar levels     3. Alpha 1 antitrypsin deficiency   -- saw pulm, will follow yearly for fibrosis from a liver perspective    4. Obesity, HLD, preDM   -- Body mass index is 32.12 kg/m².   -- increases risk for fatty liver            Follow up in about 1 year (around 10/23/2024). Video visit With MRI and labs a few days before    Orders Placed This Encounter   Procedures    MR Elastography    Phosphatidylethanol (PETH)    Hepatic Function Panel    Hepatitis A antibody, IgG    Hepatitis B Surface Ab, Qualitative        Thank you for allowing me to participate in the care of Isela WorthingtonBABAR Ding    I spent a total of 30 minutes on the day of the visit.This includes face to face time and non-face to face time preparing to see the patient (eg, review of tests), obtaining and/or reviewing separately obtained history, documenting clinical information in the electronic or other health record, independently interpreting results and communicating results to the patient/family/caregiver, and coordinating care.         CC'ed note to:

## 2023-10-23 NOTE — PATIENT INSTRUCTIONS
1. Biopsy showed fatty liver, inflammation related to fatty liver (steatohepatitis) and stage 2 scar tissue (half way to cirrhosis)  2. MRI in 1 year to reassess scar tissue in the liver   3.  Follow up in 6 months with MRI and labs a few days before    There is no FDA approved therapy for fatty liver disease. Therefore, these things are important:  Limit alcohol consumption, NONE given scar tissue in the liver   2 Weight loss goal of 15-20 lbs, referral for [facility] if interested. Also, if interested in a dietician visit to create a weight loss plan, contact the dietician team at [facility] at [email or web address] to schedule a visit to you can call [facility] in [place]: [phone] and the  will transfer the call to one of the dieticians to schedule an appointment. Or you can also call [phone] to schedule. They do offer video visits   3. Low carb/sugar and high protein diet (~1 g/kg/day).Try to limit your carb intake to LESS than 30-45 grams of carbs with a meal or LESS than 5-10 grams with any snack (total of any snack foods eaten during that time). Use Kindful Pal or Lose It tye to add up your carbs through the day. Do NOT drink any beverages with calories or carbs (this can lead to high blood sugar and weight gain). Also, some of our patients have been very successful with weight loss using the pre made/planned meal planning services that limit calories and portion size ( The main thing to look for is low calorie, high protein, low carb)  4. Exercise, 5 days per week, 30 minutes per day, as tolerated  5. Recommend good cholesterol, blood pressure, blood sugar levels     In some people, fatty liver can progress to steatohepatitis (inflamatory fatty liver) and possibly to cirrhosis, increasing the risk for liver cancer, liver failure, and death. Therefore, the lifestyle changes are very important to decrease this risk.     Website with information about  fatty liver and inflammation related to fatty liver (ESPINAL) = www.nashtruth.com  AND [email or web address]

## 2023-11-14 ENCOUNTER — TELEPHONE (OUTPATIENT)
Dept: FAMILY MEDICINE | Facility: CLINIC | Age: 53
End: 2023-11-14
Payer: COMMERCIAL

## 2023-11-14 DIAGNOSIS — R30.0 DYSURIA: Primary | ICD-10-CM

## 2023-11-14 NOTE — TELEPHONE ENCOUNTER
----- Message from Latesha Rasmussen sent at 11/14/2023  1:44 PM CST -----  .Type:  Sooner Apoointment Request    Caller is requesting a sooner appointment.  Caller declined first available appointment listed below.  Caller will not accept being placed on the waitlist and is requesting a message be sent to doctor.  Name of Caller:pt  When is the first available appointment?12/14  Symptoms:not feeling well  Would the patient rather a call back or a response via [facility]? Call back  Best Call Back Number:[phone]  Additional Information:

## 2023-11-14 NOTE — TELEPHONE ENCOUNTER
Spoke to pt. Pt has dark urine and is having symptoms of a UTI.    Pt is also have URI like symptoms. Pt is scheduled to see Dr. Posadas on Thursday.    Pt would like to have orders for a UA with reflex to be completed at the [facility] lab.

## 2023-11-15 ENCOUNTER — LAB VISIT (OUTPATIENT)
Dept: LAB | Facility: HOSPITAL | Age: 53
End: 2023-11-15
Attending: FAMILY MEDICINE
Payer: COMMERCIAL

## 2023-11-15 ENCOUNTER — TELEPHONE (OUTPATIENT)
Dept: FAMILY MEDICINE | Facility: CLINIC | Age: 53
End: 2023-11-15
Payer: COMMERCIAL

## 2023-11-15 DIAGNOSIS — R30.0 DYSURIA: ICD-10-CM

## 2023-11-15 LAB
BILIRUB UR QL STRIP: NEGATIVE
CLARITY UR REFRACT.AUTO: CLEAR
COLOR UR AUTO: YELLOW
GLUCOSE UR QL STRIP: NEGATIVE
HGB UR QL STRIP: NEGATIVE
KETONES UR QL STRIP: NEGATIVE
LEUKOCYTE ESTERASE UR QL STRIP: NEGATIVE
NITRITE UR QL STRIP: NEGATIVE
PH UR STRIP: 7 [PH] (ref 5–8)
PROT UR QL STRIP: NEGATIVE
SP GR UR STRIP: <=1.005 (ref 1–1.03)
URN SPEC COLLECT METH UR: NORMAL
UROBILINOGEN UR STRIP-ACNC: NEGATIVE EU/DL

## 2023-11-15 PROCEDURE — 81003 URINALYSIS AUTO W/O SCOPE: CPT | Mod: PN | Performed by: FAMILY MEDICINE

## 2023-11-15 NOTE — TELEPHONE ENCOUNTER
----- Message from Ortiz Aquino sent at 11/15/2023 11:16 AM CST -----  Type:  orders for urine     Who Called:pt   Who Left Message for Patient:office  Does the patient know what this is regarding?:orders for urine test  Would the patient rather a call back or a response via [facility]? Call   Best Call Back Number:[phone]  Additional Information:

## 2023-11-15 NOTE — TELEPHONE ENCOUNTER
Pt complicating of UTI    Pt would like orders sent to the lab to check urine     This is a second message on pt.

## 2023-11-16 ENCOUNTER — OFFICE VISIT (OUTPATIENT)
Dept: FAMILY MEDICINE | Facility: CLINIC | Age: 53
End: 2023-11-16
Payer: COMMERCIAL

## 2023-11-16 VITALS
BODY MASS INDEX: 33.63 KG/M2 | OXYGEN SATURATION: 95 % | HEIGHT: 61 IN | TEMPERATURE: 98 F | HEART RATE: 81 BPM | SYSTOLIC BLOOD PRESSURE: 118 MMHG | WEIGHT: 178.13 LBS | DIASTOLIC BLOOD PRESSURE: 80 MMHG

## 2023-11-16 DIAGNOSIS — J31.0 RHINITIS, UNSPECIFIED TYPE: Primary | ICD-10-CM

## 2023-11-16 PROCEDURE — 3079F PR MOST RECENT DIASTOLIC BLOOD PRESSURE 80-89 MM HG: ICD-10-PCS | Mod: CPTII,S$GLB,, | Performed by: FAMILY MEDICINE

## 2023-11-16 PROCEDURE — 3008F PR BODY MASS INDEX (BMI) DOCUMENTED: ICD-10-PCS | Mod: CPTII,S$GLB,, | Performed by: FAMILY MEDICINE

## 2023-11-16 PROCEDURE — 1159F PR MEDICATION LIST DOCUMENTED IN MEDICAL RECORD: ICD-10-PCS | Mod: CPTII,S$GLB,, | Performed by: FAMILY MEDICINE

## 2023-11-16 PROCEDURE — 1160F PR REVIEW ALL MEDS BY PRESCRIBER/CLIN PHARMACIST DOCUMENTED: ICD-10-PCS | Mod: CPTII,S$GLB,, | Performed by: FAMILY MEDICINE

## 2023-11-16 PROCEDURE — 3008F BODY MASS INDEX DOCD: CPT | Mod: CPTII,S$GLB,, | Performed by: FAMILY MEDICINE

## 2023-11-16 PROCEDURE — 3074F PR MOST RECENT SYSTOLIC BLOOD PRESSURE < 130 MM HG: ICD-10-PCS | Mod: CPTII,S$GLB,, | Performed by: FAMILY MEDICINE

## 2023-11-16 PROCEDURE — 1159F MED LIST DOCD IN RCRD: CPT | Mod: CPTII,S$GLB,, | Performed by: FAMILY MEDICINE

## 2023-11-16 PROCEDURE — 3074F SYST BP LT 130 MM HG: CPT | Mod: CPTII,S$GLB,, | Performed by: FAMILY MEDICINE

## 2023-11-16 PROCEDURE — 99213 OFFICE O/P EST LOW 20 MIN: CPT | Mod: 25,S$GLB,, | Performed by: FAMILY MEDICINE

## 2023-11-16 PROCEDURE — 1160F RVW MEDS BY RX/DR IN RCRD: CPT | Mod: CPTII,S$GLB,, | Performed by: FAMILY MEDICINE

## 2023-11-16 PROCEDURE — 96372 PR INJECTION,THERAP/PROPH/DIAG2ST, IM OR SUBCUT: ICD-10-PCS | Mod: S$GLB,,, | Performed by: FAMILY MEDICINE

## 2023-11-16 PROCEDURE — 96372 THER/PROPH/DIAG INJ SC/IM: CPT | Mod: S$GLB,,, | Performed by: FAMILY MEDICINE

## 2023-11-16 PROCEDURE — 3079F DIAST BP 80-89 MM HG: CPT | Mod: CPTII,S$GLB,, | Performed by: FAMILY MEDICINE

## 2023-11-16 PROCEDURE — 99213 PR OFFICE/OUTPT VISIT, EST, LEVL III, 20-29 MIN: ICD-10-PCS | Mod: 25,S$GLB,, | Performed by: FAMILY MEDICINE

## 2023-11-16 RX ORDER — TRIAMCINOLONE ACETONIDE 40 MG/ML
80 INJECTION, SUSPENSION INTRA-ARTICULAR; INTRAMUSCULAR ONCE
Status: COMPLETED | OUTPATIENT
Start: 2023-11-16 | End: 2023-11-16

## 2023-11-16 RX ADMIN — TRIAMCINOLONE ACETONIDE 80 MG: 40 INJECTION, SUSPENSION INTRA-ARTICULAR; INTRAMUSCULAR at 10:11

## 2023-11-16 NOTE — PROGRESS NOTES
" Patient ID: Isela Graf is a 53 y.o. female.    Chief Complaint: Cough and Nasal Congestion    HPI       Isela Graf is a 53 y.o. female complains of cough and nasal congestion.  Nasal fullness postnasal drip clear discharge.  No high-spiking fever chills.      Review of Symptoms        Physical Exam    Vitals:    11/16/23 1022   BP: 118/80   BP Location: Left arm   Patient Position: Sitting   Pulse: 81   Temp: 98.1 °F (36.7 °C)   SpO2: 95%   Weight: 80.8 kg (178 lb 2.1 oz)   Height: 5' 1" (1.549 m)        Constitutional:   Oriented to person, place, and time.appears well-developed and well-nourished.   No distress.     HENT:   Head: Normocephalic and atraumatic.     Right Ear: Tympanic membrane, external ear and ear canal normal     Left Ear: Tympanic membrane, external ear and ear canal normal    Nose: External Normal.  Inflamed turbinates    Mouth/Throat: Uvula is midline, oropharynx is clear and moist and mucous membranes are normal.     Neck: supple no anterior cervical adenopathy    Carotid artery:  Bruit    NEG []   POS[]    Eyes:   Conjunctivae are normal. Right eye exhibits no discharge. Left eye exhibits no discharge. No scleral icterus. No periorbital edema     Cardiovascular:  Regular rate and rhythm with normal S1 and S2     Pulmonary/Chest:   Clear to auscultation bilaterally without wheezes, rhonchi or rales    Musculoskeletal:  No edema. No obvious deformity No wasting     Neurological:   Alert and oriented to person, place, and time. Coordination normal.     Skin:   Skin is warm and dry.   No diaphoresis.   No rash noted.    Psychiatric: Normal mood and affect. Behavior is normal. Judgment and thought content normal.       Assessment / Plan:      ICD-10-CM ICD-9-CM   1. Rhinitis, unspecified type  J31.0 472.0     Rhinitis, unspecified type  -     triamcinolone acetonide injection 80 mg      "

## 2023-11-30 ENCOUNTER — PATIENT MESSAGE (OUTPATIENT)
Dept: FAMILY MEDICINE | Facility: CLINIC | Age: 53
End: 2023-11-30
Payer: COMMERCIAL

## 2023-11-30 RX ORDER — TIZANIDINE 4 MG/1
4 TABLET ORAL EVERY 8 HOURS
Qty: 90 TABLET | Refills: 3 | Status: SHIPPED | OUTPATIENT
Start: 2023-11-30 | End: 2023-12-08 | Stop reason: SDUPTHER

## 2023-11-30 RX ORDER — CITALOPRAM 10 MG/1
10 TABLET ORAL NIGHTLY
Qty: 90 TABLET | Refills: 3 | Status: SHIPPED | OUTPATIENT
Start: 2023-11-30 | End: 2023-12-08 | Stop reason: SDUPTHER

## 2023-11-30 RX ORDER — ROSUVASTATIN CALCIUM 20 MG/1
20 TABLET, COATED ORAL NIGHTLY
Qty: 90 TABLET | Refills: 3 | Status: SHIPPED | OUTPATIENT
Start: 2023-11-30 | End: 2023-12-08 | Stop reason: SDUPTHER

## 2023-11-30 RX ORDER — HYDROGEN PEROXIDE 3 %
20 SOLUTION, NON-ORAL MISCELLANEOUS
Qty: 90 CAPSULE | Refills: 3 | Status: SHIPPED | OUTPATIENT
Start: 2023-11-30 | End: 2023-12-08 | Stop reason: SDUPTHER

## 2023-11-30 NOTE — TELEPHONE ENCOUNTER
Care Due:                  Date            Visit Type   Department     Provider  --------------------------------------------------------------------------------                                EP -                              PRIMARY      [facility] FAMILY  Last Visit: 11-      CARE ([facility])   MEDICINE       Niko Posadas                              EP -                              PRIMARY      [facility] FAMILY  Next Visit: 02-      CARE ([facility])   MEDICINE       Carlos Santiago                                                            Last  Test          Frequency    Reason                     Performed    Due Date  --------------------------------------------------------------------------------    Lipid Panel.  12 months..  rosuvastatin.............  02- 02-    [facility] Embedded Care Due Messages. Reference number: [id number].   11/30/2023 11:18:57 AM CST

## 2023-12-01 ENCOUNTER — OFFICE VISIT (OUTPATIENT)
Dept: DERMATOLOGY | Facility: CLINIC | Age: 53
End: 2023-12-01
Payer: COMMERCIAL

## 2023-12-01 DIAGNOSIS — D48.5 NEOPLASM OF UNCERTAIN BEHAVIOR OF SKIN: Primary | ICD-10-CM

## 2023-12-01 DIAGNOSIS — Z85.828 HISTORY OF NONMELANOMA SKIN CANCER: ICD-10-CM

## 2023-12-01 DIAGNOSIS — Z85.820 HISTORY OF MELANOMA: ICD-10-CM

## 2023-12-01 DIAGNOSIS — L73.8 SEBACEOUS HYPERPLASIA OF FACE: ICD-10-CM

## 2023-12-01 PROCEDURE — 1159F PR MEDICATION LIST DOCUMENTED IN MEDICAL RECORD: ICD-10-PCS | Mod: CPTII,S$GLB,, | Performed by: STUDENT IN AN ORGANIZED HEALTH CARE EDUCATION/TRAINING PROGRAM

## 2023-12-01 PROCEDURE — 88342 IMHCHEM/IMCYTCHM 1ST ANTB: CPT | Mod: 26,,, | Performed by: PATHOLOGY

## 2023-12-01 PROCEDURE — 99999 PR PBB SHADOW E&M-EST. PATIENT-LVL IV: CPT | Mod: PBBFAC,,, | Performed by: STUDENT IN AN ORGANIZED HEALTH CARE EDUCATION/TRAINING PROGRAM

## 2023-12-01 PROCEDURE — 99213 PR OFFICE/OUTPT VISIT, EST, LEVL III, 20-29 MIN: ICD-10-PCS | Mod: 25,S$GLB,, | Performed by: STUDENT IN AN ORGANIZED HEALTH CARE EDUCATION/TRAINING PROGRAM

## 2023-12-01 PROCEDURE — 1159F MED LIST DOCD IN RCRD: CPT | Mod: CPTII,S$GLB,, | Performed by: STUDENT IN AN ORGANIZED HEALTH CARE EDUCATION/TRAINING PROGRAM

## 2023-12-01 PROCEDURE — 99999 PR PBB SHADOW E&M-EST. PATIENT-LVL IV: ICD-10-PCS | Mod: PBBFAC,,, | Performed by: STUDENT IN AN ORGANIZED HEALTH CARE EDUCATION/TRAINING PROGRAM

## 2023-12-01 PROCEDURE — 1160F PR REVIEW ALL MEDS BY PRESCRIBER/CLIN PHARMACIST DOCUMENTED: ICD-10-PCS | Mod: CPTII,S$GLB,, | Performed by: STUDENT IN AN ORGANIZED HEALTH CARE EDUCATION/TRAINING PROGRAM

## 2023-12-01 PROCEDURE — 88342 CHG IMMUNOCYTOCHEMISTRY: ICD-10-PCS | Mod: 26,,, | Performed by: PATHOLOGY

## 2023-12-01 PROCEDURE — 88305 TISSUE EXAM BY PATHOLOGIST: ICD-10-PCS | Mod: 26,,, | Performed by: PATHOLOGY

## 2023-12-01 PROCEDURE — 11102 PR TANGENTIAL BIOPSY, SKIN, SINGLE LESION: ICD-10-PCS | Mod: S$GLB,,, | Performed by: STUDENT IN AN ORGANIZED HEALTH CARE EDUCATION/TRAINING PROGRAM

## 2023-12-01 PROCEDURE — 11102 TANGNTL BX SKIN SINGLE LES: CPT | Mod: S$GLB,,, | Performed by: STUDENT IN AN ORGANIZED HEALTH CARE EDUCATION/TRAINING PROGRAM

## 2023-12-01 PROCEDURE — 88342 IMHCHEM/IMCYTCHM 1ST ANTB: CPT | Performed by: PATHOLOGY

## 2023-12-01 PROCEDURE — 88305 TISSUE EXAM BY PATHOLOGIST: CPT | Mod: 26,,, | Performed by: PATHOLOGY

## 2023-12-01 PROCEDURE — 88305 TISSUE EXAM BY PATHOLOGIST: CPT | Performed by: PATHOLOGY

## 2023-12-01 PROCEDURE — 1160F RVW MEDS BY RX/DR IN RCRD: CPT | Mod: CPTII,S$GLB,, | Performed by: STUDENT IN AN ORGANIZED HEALTH CARE EDUCATION/TRAINING PROGRAM

## 2023-12-01 PROCEDURE — 99213 OFFICE O/P EST LOW 20 MIN: CPT | Mod: 25,S$GLB,, | Performed by: STUDENT IN AN ORGANIZED HEALTH CARE EDUCATION/TRAINING PROGRAM

## 2023-12-01 RX ORDER — ALBUTEROL SULFATE 90 UG/1
2 AEROSOL, METERED RESPIRATORY (INHALATION) EVERY 4 HOURS PRN
Qty: 17 G | Refills: 0 | Status: SHIPPED | OUTPATIENT
Start: 2023-12-01

## 2023-12-01 NOTE — PROGRESS NOTES
Subjective:      Patient ID:  Isela Graf is a 53 y.o. female who presents for   Chief Complaint   Patient presents with    Skin Check     TBSE     Isela Graf is a 53 y.o. female who presents for: FBSE screening exam for skin cancer.    Last office visit 7/25/22 with Dr. Sol for TBSE.  Here with her  today    The patient has no specific concerns today.    Pertinent history:  Personal history of mole removal: Yes  Personal history of skin cancer: Yes - MM stage 1b excised from frontal scalp in 1/2015, BCC R cheek excised years ago, SCCis 7/26/22 s/p Efudex         Review of Systems   Constitutional:  Negative for fever and chills.   Skin:  Positive for activity-related sunscreen use and wears hat. Negative for daily sunscreen use and recent sunburn.     Objective:   Physical Exam   Constitutional: She appears well-developed and well-nourished. No distress.   Neurological: She is alert and oriented to person, place, and time. She is not disoriented.   Psychiatric: She has a normal mood and affect.   Skin:   Areas Examined (abnormalities noted in diagram):   Scalp / Hair Palpated and Inspected  Head / Face Inspection Performed  Neck Inspection Performed  Chest / Axilla Inspection Performed  Abdomen Inspection Performed  Genitals / Buttocks / Groin Inspection Performed  Back Inspection Performed  RUE Inspected  LUE Inspection Performed  RLE Inspected  LLE Inspection Performed  Nails and Digits Inspection Performed                 Diagram Legend     Erythematous scaling macule/papule c/w actinic keratosis       Vascular papule c/w angioma      Pigmented verrucoid papule/plaque c/w seborrheic keratosis      Yellow umbilicated papule c/w sebaceous hyperplasia      Irregularly shaped tan macule c/w lentigo     1-2 mm smooth white papules consistent with Milia      Movable subcutaneous cyst with punctum c/w epidermal inclusion cyst      Subcutaneous movable cyst c/w pilar cyst      Firm  pink to brown papule c/w dermatofibroma      Pedunculated fleshy papule(s) c/w skin tag(s)      Evenly pigmented macule c/w junctional nevus     Mildly variegated pigmented, slightly irregular-bordered macule c/w mildly atypical nevus      Flesh colored to evenly pigmented papule c/w intradermal nevus       Pink pearly papule/plaque c/w basal cell carcinoma      Erythematous hyperkeratotic cursted plaque c/w SCC      Surgical scar with no sign of skin cancer recurrence      Open and closed comedones      Inflammatory papules and pustules      Verrucoid papule consistent consistent with wart     Erythematous eczematous patches and plaques     Dystrophic onycholytic nail with subungual debris c/w onychomycosis     Umbilicated papule    Erythematous-base heme-crusted tan verrucoid plaque consistent with inflamed seborrheic keratosis     Erythematous Silvery Scaling Plaque c/w Psoriasis     See annotation      Assessment / Plan:      Pathology Orders:       Normal Orders This Visit    Specimen to Pathology, Dermatology     Questions:    Procedure Type: Dermatology and skin neoplasms    Number of Specimens: 1    ------------------------: -------------------------    Spec 1 Procedure: Biopsy    Spec 1 Clinical Impression: 8 x 6 mm asymmetric brown macule, reticular pattern under dermoscopy with background erythema and dotted vessels- ddx dysplastic nevus v melanoma v benign nevus    Spec 1 Source: mid upper back    Release to patient: Immediate          Neoplasm of uncertain behavior of skin  -     Specimen to Pathology, Dermatology  Shave biopsy procedure note:    Shave biopsy performed after verbal consent including risk of infection, scar, recurrence, need for additional treatment of site. Area prepped with alcohol, anesthetized with approximately 1.0cc of 1% lidocaine with epinephrine. Lesional tissue shaved with razor blade. Hemostasis achieved with application of aluminum chloride followed by hyfrecation. No  complications. Dressing applied. Wound care explained.    Sebaceous hyperplasia of face  This is a common condition representing benign enlargement of the sebaceous lobule. It typically occurs in adulthood. Reassurance given to patient.     History of nonmelanoma skin cancer  Examined areas of prior scars, no evidence of recurrence  - Continue skin exams at least annually, increased risk of additional skin cancers developing in the future  - I discussed the importance of sun protection with the patient. Recommend daily use of SPF 30+ physical or mineral sunscreen, apply 15 minutes before going outdoors and reapply every 2 hours. Discussed wide-brimmed hats and UPF 50+ clothing.    Personal history of melanoma  No fevers, chills, or unintended weight loss; cervical lymph nodes examined  Well-healed scar located on the frontal scalp no evidence of recurrence  - Continue skin exams qYearly  - Discussed ABCDE's of nevi.  Monitor for new mole or moles that are becoming bigger, darker, irritated, or developing irregular borders. Instructed patient to observe lesion(s) for changes and follow up in clinic if changes are noted. Patient to monitor skin at home for new or changing lesions.     RTC 1 year, sooner pending bx results

## 2023-12-04 ENCOUNTER — PATIENT MESSAGE (OUTPATIENT)
Dept: HEPATOLOGY | Facility: CLINIC | Age: 53
End: 2023-12-04
Payer: COMMERCIAL

## 2023-12-04 RX ORDER — HYDROGEN PEROXIDE 3 %
20 SOLUTION, NON-ORAL MISCELLANEOUS DAILY
Qty: 90 CAPSULE | Refills: 3 | OUTPATIENT
Start: 2023-12-04

## 2023-12-04 RX ORDER — CITALOPRAM 10 MG/1
10 TABLET ORAL DAILY
Qty: 90 TABLET | Refills: 3 | OUTPATIENT
Start: 2023-12-04

## 2023-12-04 RX ORDER — ROSUVASTATIN CALCIUM 20 MG/1
20 TABLET, COATED ORAL NIGHTLY
Qty: 90 TABLET | Refills: 3 | OUTPATIENT
Start: 2023-12-04

## 2023-12-04 NOTE — TELEPHONE ENCOUNTER
No care due was identified.  [facility] Embedded Care Due Messages. Reference number: [id number].   12/04/2023 12:05:17 PM CST

## 2023-12-04 NOTE — TELEPHONE ENCOUNTER
----- Message from Nichelle Warren sent at 12/4/2023 12:16 PM CST -----  Type:  RX Refill Request    Who Called: pt  Refill or New Rx:refill  RX Name and Strength:tiZANidine (ZANAFLEX) 4 MG tablet rosuvastatin (CRESTOR) 20 MG tablet  Preferred Pharmacy with phone number:[id number] - [place], [place]  [address] [place] [address]  Local or Mail Order:local  Ordering Provider:peggy  Would the patient rather a call back or a response via [facility]? call  Best Call Back Number:[phone]  Additional Information: patient states she a refill to last until her medication comes in

## 2023-12-05 NOTE — TELEPHONE ENCOUNTER
Refill Routing Note   Medication(s) are not appropriate for processing by [facility] for the following reason(s):        Outside of protocol    [facility] action(s):  Route               Appointments  past 12m or future 3m with PCP    Date Provider   Last Visit   8/24/2023 Carlos Santiago MD   Next Visit   2/26/2024 Carlos Santiago MD   ED visits in past 90 days: 0        Note composed:7:57 PM 12/04/2023

## 2023-12-08 ENCOUNTER — TELEPHONE (OUTPATIENT)
Dept: FAMILY MEDICINE | Facility: CLINIC | Age: 53
End: 2023-12-08
Payer: COMMERCIAL

## 2023-12-08 LAB
FINAL PATHOLOGIC DIAGNOSIS: NORMAL
GROSS: NORMAL
Lab: NORMAL
MICROSCOPIC EXAM: NORMAL

## 2023-12-08 RX ORDER — ROSUVASTATIN CALCIUM 20 MG/1
20 TABLET, COATED ORAL NIGHTLY
Qty: 90 TABLET | Refills: 3 | Status: SHIPPED | OUTPATIENT
Start: 2023-12-08

## 2023-12-08 RX ORDER — HYDROGEN PEROXIDE 3 %
20 SOLUTION, NON-ORAL MISCELLANEOUS
Qty: 90 CAPSULE | Refills: 3 | Status: SHIPPED | OUTPATIENT
Start: 2023-12-08

## 2023-12-08 RX ORDER — TIZANIDINE 4 MG/1
4 TABLET ORAL NIGHTLY
Qty: 90 TABLET | Refills: 3 | Status: SHIPPED | OUTPATIENT
Start: 2023-12-08 | End: 2024-02-27

## 2023-12-08 RX ORDER — CITALOPRAM 10 MG/1
10 TABLET ORAL NIGHTLY
Qty: 90 TABLET | Refills: 3 | Status: SHIPPED | OUTPATIENT
Start: 2023-12-08

## 2023-12-10 RX ORDER — TIZANIDINE 4 MG/1
4 TABLET ORAL EVERY 8 HOURS
Qty: 60 TABLET | Refills: 2 | OUTPATIENT
Start: 2023-12-10

## 2023-12-21 ENCOUNTER — HOSPITAL ENCOUNTER (OUTPATIENT)
Dept: RADIOLOGY | Facility: HOSPITAL | Age: 53
Discharge: HOME | End: 2023-12-21
Attending: FAMILY MEDICINE
Payer: COMMERCIAL

## 2023-12-21 DIAGNOSIS — Z12.31 ENCOUNTER FOR SCREENING MAMMOGRAM FOR BREAST CANCER: ICD-10-CM

## 2023-12-21 PROCEDURE — 77067 MAMMO DIGITAL SCREENING BILAT WITH TOMO: ICD-10-PCS | Mod: 26,,, | Performed by: RADIOLOGY

## 2023-12-21 PROCEDURE — 77067 SCR MAMMO BI INCL CAD: CPT | Mod: 26,,, | Performed by: RADIOLOGY

## 2023-12-21 PROCEDURE — 77063 MAMMO DIGITAL SCREENING BILAT WITH TOMO: ICD-10-PCS | Mod: 26,,, | Performed by: RADIOLOGY

## 2023-12-21 PROCEDURE — 77063 BREAST TOMOSYNTHESIS BI: CPT | Mod: 26,,, | Performed by: RADIOLOGY

## 2023-12-21 PROCEDURE — 77067 SCR MAMMO BI INCL CAD: CPT | Mod: TC,PN

## 2024-01-02 ENCOUNTER — PROCEDURE VISIT (OUTPATIENT)
Dept: DERMATOLOGY | Facility: CLINIC | Age: 54
End: 2024-01-02
Payer: COMMERCIAL

## 2024-01-02 ENCOUNTER — PATIENT MESSAGE (OUTPATIENT)
Dept: DERMATOLOGY | Facility: CLINIC | Age: 54
End: 2024-01-02
Payer: COMMERCIAL

## 2024-01-02 DIAGNOSIS — D23.9 DYSPLASTIC NEVUS: Primary | ICD-10-CM

## 2024-01-02 PROCEDURE — 88342 IMHCHEM/IMCYTCHM 1ST ANTB: CPT | Performed by: PATHOLOGY

## 2024-01-02 PROCEDURE — 88342 IMHCHEM/IMCYTCHM 1ST ANTB: CPT | Mod: 26,,, | Performed by: PATHOLOGY

## 2024-01-02 PROCEDURE — 11402 EXC TR-EXT B9+MARG 1.1-2 CM: CPT | Mod: 51,S$GLB,, | Performed by: STUDENT IN AN ORGANIZED HEALTH CARE EDUCATION/TRAINING PROGRAM

## 2024-01-02 PROCEDURE — 99499 UNLISTED E&M SERVICE: CPT | Mod: S$GLB,,, | Performed by: STUDENT IN AN ORGANIZED HEALTH CARE EDUCATION/TRAINING PROGRAM

## 2024-01-02 PROCEDURE — 88305 TISSUE EXAM BY PATHOLOGIST: CPT | Performed by: PATHOLOGY

## 2024-01-02 PROCEDURE — 12032 INTMD RPR S/A/T/EXT 2.6-7.5: CPT | Mod: S$GLB,,, | Performed by: STUDENT IN AN ORGANIZED HEALTH CARE EDUCATION/TRAINING PROGRAM

## 2024-01-02 PROCEDURE — 88305 TISSUE EXAM BY PATHOLOGIST: CPT | Mod: 26,,, | Performed by: PATHOLOGY

## 2024-01-02 NOTE — PROGRESS NOTES
PROCEDURE NOTE  Here with  today  Here for excision severely dysplastic nevus    PROCEDURE: Elliptical excision with intermediate layered repair in order to decrease dead space and close large gap.    ANESTHETIC: 15 cc 1% Xylocaine with Epinephrine 1:100,000, buffered    SURGEON: Kylah Le M.D.    ASSISTANTS: Carissa Justice MA    PREOPERATIVE DIAGNOSIS:  Severely Atypical Nevus    POSTOPERATIVE DIAGNOSIS:  Same as preoperative diagnosis    PATHOLOGIC DIAGNOSIS: Pending    LOCATION: mid upper back     INITIAL LESION SIZE: 1.0 x 1.0 cm    EXCISED DIAMETER: 2.0 x 2.0 cm    PREPARATION: The diagnosis, procedure, alternatives, benefits and risks, including but not limited to: infection, bleeding/bruising, drug reactions, pain, scar or cosmetic defect, local sensation disturbances, wound dehiscence (separation of wound edges after sutures removed) and/or recurrence of present condition were explained to the patient. The patient elected to proceed.  Patient's identity was verified using 2 patient identifiers and the side and site was verified.  Time out period with surgeon, assistant and patient in surgical suite was taken.    PROCEDURE: The location noted above was prepped, draped, and anesthetized in the usual sterile fashion per  me . Lesional tissue was carefully marked with at least 5 mm margins of clinically normal skin in all directions. A fusiform elliptical excision was done with #15 blade carried down completely through the dermis into the deep subcutaneous tissues to the level of the non-muscle fascia, and dissection was carried out in that plane.  Electrocoagulation was used to obtain hemostasis. Blood loss was minimal. The wound was then approximated in a layered fashion with subcutaneous and intradermal sutures of 3.0 Monocryl, approximately 7 in number, and the wound was then superficially closed with simple interrupted sutures of 4.0 Prolene.    The patient tolerated the procedure well.    The  area was cleaned and dressed appropriately and the patient was given wound care instructions, as well as an appointment for follow-up evaluation.    LENGTH OF REPAIR: 7.0 cm

## 2024-01-08 LAB
FINAL PATHOLOGIC DIAGNOSIS: NORMAL
GROSS: NORMAL
Lab: NORMAL
MICROSCOPIC EXAM: NORMAL

## 2024-01-09 ENCOUNTER — PATIENT MESSAGE (OUTPATIENT)
Dept: DERMATOLOGY | Facility: CLINIC | Age: 54
End: 2024-01-09
Payer: COMMERCIAL

## 2024-01-12 ENCOUNTER — CLINICAL SUPPORT (OUTPATIENT)
Dept: DERMATOLOGY | Facility: CLINIC | Age: 54
End: 2024-01-12
Payer: COMMERCIAL

## 2024-01-12 ENCOUNTER — PATIENT MESSAGE (OUTPATIENT)
Dept: DERMATOLOGY | Facility: CLINIC | Age: 54
End: 2024-01-12

## 2024-01-12 DIAGNOSIS — Z48.02 VISIT FOR SUTURE REMOVAL: Primary | ICD-10-CM

## 2024-01-12 PROCEDURE — 99999 PR PBB SHADOW E&M-EST. PATIENT-LVL I: CPT | Mod: PBBFAC,,,

## 2024-01-12 PROCEDURE — 99024 POSTOP FOLLOW-UP VISIT: CPT | Mod: S$GLB,,, | Performed by: STUDENT IN AN ORGANIZED HEALTH CARE EDUCATION/TRAINING PROGRAM

## 2024-01-12 NOTE — PROGRESS NOTES
Subjective:      Patient ID:  Isela Graf is a 53 y.o. female who presents for   Chief Complaint   Patient presents with    Suture / Staple Removal     CC: 53 y.o.female patient is here for suture removal.     HPI: Patient is s/p excision of mid upper back on 01/08/24.  Patient reports no problems.    WOUND PE:  Sutures intact.  Wound healing well.  Good approximation of skin edges.  No signs or symptoms of infection.    IMPRESSION:  Skin, mid upper back, excision:   - RESIDUAL ATYPICAL COMPOUND NEVUS.   - MARGINS ARE NEGATIVE.   - PREVIOUS BIOPSY SITE CHANGES.       PLAN:  Sutures removed today.  Continue wound care.    RTC: In 3 months for skin check or sooner should any new concerns arise       Review of Systems    Objective:   Physical Exam     Diagram Legend     Erythematous scaling macule/papule c/w actinic keratosis       Vascular papule c/w angioma      Pigmented verrucoid papule/plaque c/w seborrheic keratosis      Yellow umbilicated papule c/w sebaceous hyperplasia      Irregularly shaped tan macule c/w lentigo     1-2 mm smooth white papules consistent with Milia      Movable subcutaneous cyst with punctum c/w epidermal inclusion cyst      Subcutaneous movable cyst c/w pilar cyst      Firm pink to brown papule c/w dermatofibroma      Pedunculated fleshy papule(s) c/w skin tag(s)      Evenly pigmented macule c/w junctional nevus     Mildly variegated pigmented, slightly irregular-bordered macule c/w mildly atypical nevus      Flesh colored to evenly pigmented papule c/w intradermal nevus       Pink pearly papule/plaque c/w basal cell carcinoma      Erythematous hyperkeratotic cursted plaque c/w SCC      Surgical scar with no sign of skin cancer recurrence      Open and closed comedones      Inflammatory papules and pustules      Verrucoid papule consistent consistent with wart     Erythematous eczematous patches and plaques     Dystrophic onycholytic nail with subungual debris c/w  onychomycosis     Umbilicated papule    Erythematous-base heme-crusted tan verrucoid plaque consistent with inflamed seborrheic keratosis     Erythematous Silvery Scaling Plaque c/w Psoriasis     See annotation      Assessment / Plan:        There are no diagnoses linked to this encounter.         No follow-ups on file.

## 2024-01-23 ENCOUNTER — TELEPHONE (OUTPATIENT)
Dept: FAMILY MEDICINE | Facility: CLINIC | Age: 54
End: 2024-01-23
Payer: COMMERCIAL

## 2024-01-23 NOTE — TELEPHONE ENCOUNTER
----- Message from Ester Martinez sent at 1/23/2024  4:47 PM CST -----  Type:  Patient Returning Call    Who Called:Pt   Who Left Message for Patient: Chaya   Does the patient know what this is regarding?:  Would the patient rather a call back or a response via [facility]? Call back   Best Call Back Number: [phone]  Additional Information:

## 2024-01-23 NOTE — TELEPHONE ENCOUNTER
----- Message from Kimberlyn Hoyos sent at 1/23/2024  4:33 PM CST -----  Contact: pt  Type:  Same Day Appointment Request    Caller is requesting a same day appointment.  Caller declined first available appointment listed below.    Name of Caller:pt   When is the first available appointment?  Symptoms:Cold and congestion (green mucus)  Best Call Back Number:[phone]  Additional Information:

## 2024-01-24 ENCOUNTER — PATIENT MESSAGE (OUTPATIENT)
Dept: FAMILY MEDICINE | Facility: CLINIC | Age: 54
End: 2024-01-24

## 2024-02-05 ENCOUNTER — NURSE TRIAGE (OUTPATIENT)
Dept: ADMINISTRATIVE | Facility: CLINIC | Age: 54
End: 2024-02-05
Payer: COMMERCIAL

## 2024-02-05 ENCOUNTER — TELEPHONE (OUTPATIENT)
Dept: FAMILY MEDICINE | Facility: CLINIC | Age: 54
End: 2024-02-05
Payer: COMMERCIAL

## 2024-02-05 NOTE — TELEPHONE ENCOUNTER
----- Message from Evie Salomon sent at 2/5/2024 12:33 PM CST -----  Type:  Same Day Appointment Request    Caller is requesting a same day appointment.  Caller declined first available appointment listed below.    Name of Caller:pt  When is the first available appointment?july  Symptoms:wheezing   Best Call Back Number [phone]  Additional Information:

## 2024-02-05 NOTE — TELEPHONE ENCOUNTER
Spoke with pt  who reports that she has had cough for pat few weeks. Reports that she was coughing up green colored mucus. Advised to be sen by provider within 4 hours. Verbalized understanding. ED/UC advised OOD VV also offered.      Reason for Disposition   Wheezing is present    Additional Information   Negative: SEVERE difficulty breathing (e.g., struggling for each breath, speaks in single words)   Negative: [1] Breathing stopped AND [2] hasn't returned   Negative: Choking on something   Negative: Bluish (or gray) lips or face now   Negative: Difficult to awaken or acting confused (e.g., disoriented, slurred speech)   Negative: Passed out (i.e., lost consciousness, collapsed and was not responding)   Negative: Wheezing started suddenly after medicine, an allergic food or bee sting   Negative: SEVERE difficulty breathing (e.g., struggling for each breath, speaks in single words)   Negative: Bluish (or gray) lips or face now   Negative: [1] Difficulty breathing AND [2] exposure to flames, smoke, or fumes   Negative: [1] Stridor AND [2] difficulty breathing   Negative: Sounds like a life-threatening emergency to the triager   Negative: [1] MODERATE difficulty breathing (e.g., speaks in phrases, SOB even at rest, pulse 100-120) AND [2] still present when not coughing   Negative: Chest pain  (Exception: MILD central chest pain, present only when coughing.)   Negative: Patient sounds very sick or weak to the triager   Negative: [1] MILD difficulty breathing (e.g., minimal/no SOB at rest, SOB with walking, pulse <100) AND [2] still present when not coughing   Negative: [1] Coughed up blood AND [2] > 1 tablespoon (15 ml)   (Exception: Blood-tinged sputum.)   Negative: Fever > 103 F (39.4 C)   Negative: [1] Fever > 101 F (38.3 C) AND [2] age > 60 years   Negative: [1] Fever > 100.0 F (37.8 C) AND [2] bedridden (e.g., CVA, chronic illness, recovering from surgery)   Negative: [1] Fever > 100.0 F (37.8 C) AND [2] diabetes  mellitus or weak immune system (e.g., HIV positive, cancer chemo, splenectomy, organ transplant, chronic steroids)    Protocols used: Breathing Difficulty-A-AH, Cough - Acute Chijfswfzv-O-PK

## 2024-02-06 ENCOUNTER — OFFICE VISIT (OUTPATIENT)
Dept: FAMILY MEDICINE | Facility: CLINIC | Age: 54
End: 2024-02-06
Payer: COMMERCIAL

## 2024-02-06 ENCOUNTER — TELEPHONE (OUTPATIENT)
Dept: FAMILY MEDICINE | Facility: CLINIC | Age: 54
End: 2024-02-06

## 2024-02-06 VITALS
DIASTOLIC BLOOD PRESSURE: 82 MMHG | WEIGHT: 182.56 LBS | OXYGEN SATURATION: 95 % | BODY MASS INDEX: 34.47 KG/M2 | HEIGHT: 61 IN | TEMPERATURE: 99 F | HEART RATE: 78 BPM | SYSTOLIC BLOOD PRESSURE: 138 MMHG

## 2024-02-06 DIAGNOSIS — J31.0 RHINITIS, UNSPECIFIED TYPE: Primary | ICD-10-CM

## 2024-02-06 PROCEDURE — 3079F DIAST BP 80-89 MM HG: CPT | Mod: CPTII,S$GLB,, | Performed by: FAMILY MEDICINE

## 2024-02-06 PROCEDURE — 99213 OFFICE O/P EST LOW 20 MIN: CPT | Mod: S$GLB,,, | Performed by: FAMILY MEDICINE

## 2024-02-06 PROCEDURE — 1160F RVW MEDS BY RX/DR IN RCRD: CPT | Mod: CPTII,S$GLB,, | Performed by: FAMILY MEDICINE

## 2024-02-06 PROCEDURE — 3075F SYST BP GE 130 - 139MM HG: CPT | Mod: CPTII,S$GLB,, | Performed by: FAMILY MEDICINE

## 2024-02-06 PROCEDURE — 3008F BODY MASS INDEX DOCD: CPT | Mod: CPTII,S$GLB,, | Performed by: FAMILY MEDICINE

## 2024-02-06 PROCEDURE — 1159F MED LIST DOCD IN RCRD: CPT | Mod: CPTII,S$GLB,, | Performed by: FAMILY MEDICINE

## 2024-02-06 NOTE — TELEPHONE ENCOUNTER
----- Message from Shawna Mas sent at 2/6/2024 12:03 PM CST -----  Type:  Patient Returning Call    Who Called:pt   Who Left Message for Patient:nurse ricci   Does the patient know what this is regarding?: scheduling   Would the patient rather a call back or a response via [facility]?  Call back   Best Call Back Number: [phone]  Additional Information:

## 2024-02-06 NOTE — TELEPHONE ENCOUNTER
I  for the pt to rtn call to s/c  appt to see a provider here at [facility] if needed

## 2024-02-19 NOTE — PROGRESS NOTES
" Patient ID: Isela Graf is a 53 y.o. female.    Chief Complaint: Cough, Sinus Problem, Back Pain, and burning to chest    HPI    Isela Graf is a 53 y.o. female.  with several day hx of mild to moderate nasal congestion, post nasal drip and non productive cough.  Over the counter meds have not resolved symptoms.    No fever chills nausea vomiting or diarrhea.         Review of Symptoms    Constitutional  No change in activity, No chills/ fever   Resp  Neg hemoptysis, stridor, choking  CVS  Neg chest pain, palpitations    Physical Exam    Vitals:    02/06/24 1618   BP: 138/82   BP Location: Left arm   Patient Position: Sitting   Pulse: 78   Temp: 98.7 °F (37.1 °C)   TempSrc: Oral   SpO2: 95%   Weight: 82.8 kg (182 lb 8.7 oz)   Height: 5' 1" (1.549 m)       Constitutional:   Oriented to person, place, and time.appears well-developed and well-nourished.   No distress.     HENT:   Head: Normocephalic and atraumatic.     Right Ear: Tympanic membrane, ear canal and External ear normal      Left Ear: Tympanic membrane, ear canal and External ear normal     Nose: External Normal. Normal turbinates, No rhinorrhea (Unless checked)  [x] Edematous Turbinates   NO Purulent Discharge  NO Evidence of Bleeding   [x] Clear Discharge    Mouth/Throat: Uvula is midline, oropharynx is clear and moist and mucous membranes are normal.     Neck: supple no anterior cervical adenopathy    Eyes:   Conjunctivae are normal. Right eye exhibits no discharge. Left eye exhibits no discharge. No scleral icterus. No periorbital edema     Cardiovascular:  Regular rate and rhythm with normal S1 and S2     Pulmonary/Chest:   Clear to auscultation bilaterally without wheezes, rhonchi or rales    Musculoskeletal:   No edema. No obvious deformity No wasting   Neurological:   Alert and oriented to person, place, and time. Coordination normal.     Skin:   Skin is warm and dry. No rash noted.  No diaphoresis.     Psychiatric: Normal " mood and affect. Behavior is normal. Judgment and thought content normal.       Assessment / Plan:      ICD-10-CM ICD-9-CM   1. Rhinitis, unspecified type  J31.0 472.0     Rhinitis, unspecified type    Discussed over-the-counter medications pros and cons of prescription antibiotics-no need for antibiotics at this time.

## 2024-02-26 ENCOUNTER — OFFICE VISIT (OUTPATIENT)
Dept: FAMILY MEDICINE | Facility: CLINIC | Age: 54
End: 2024-02-26
Payer: COMMERCIAL

## 2024-02-26 VITALS
DIASTOLIC BLOOD PRESSURE: 78 MMHG | TEMPERATURE: 99 F | SYSTOLIC BLOOD PRESSURE: 128 MMHG | WEIGHT: 183.19 LBS | BODY MASS INDEX: 34.58 KG/M2 | OXYGEN SATURATION: 96 % | HEIGHT: 61 IN | HEART RATE: 88 BPM

## 2024-02-26 DIAGNOSIS — E88.01 ALPHA-1-ANTITRYPSIN DEFICIENCY: ICD-10-CM

## 2024-02-26 DIAGNOSIS — J84.9 INTERSTITIAL PULMONARY DISEASE, UNSPECIFIED: ICD-10-CM

## 2024-02-26 DIAGNOSIS — Z00.00 ANNUAL PHYSICAL EXAM: Primary | ICD-10-CM

## 2024-02-26 DIAGNOSIS — E66.01 SEVERE OBESITY (BMI 35.0-39.9) WITH COMORBIDITY: ICD-10-CM

## 2024-02-26 DIAGNOSIS — C43.4 MELANOMA OF SCALP: ICD-10-CM

## 2024-02-26 PROCEDURE — 3078F DIAST BP <80 MM HG: CPT | Mod: CPTII,S$GLB,, | Performed by: FAMILY MEDICINE

## 2024-02-26 PROCEDURE — 3008F BODY MASS INDEX DOCD: CPT | Mod: CPTII,S$GLB,, | Performed by: FAMILY MEDICINE

## 2024-02-26 PROCEDURE — 99396 PREV VISIT EST AGE 40-64: CPT | Mod: S$GLB,,, | Performed by: FAMILY MEDICINE

## 2024-02-26 PROCEDURE — 1159F MED LIST DOCD IN RCRD: CPT | Mod: CPTII,S$GLB,, | Performed by: FAMILY MEDICINE

## 2024-02-26 PROCEDURE — 3074F SYST BP LT 130 MM HG: CPT | Mod: CPTII,S$GLB,, | Performed by: FAMILY MEDICINE

## 2024-02-26 PROCEDURE — 1160F RVW MEDS BY RX/DR IN RCRD: CPT | Mod: CPTII,S$GLB,, | Performed by: FAMILY MEDICINE

## 2024-02-26 RX ORDER — MELOXICAM 15 MG/1
TABLET ORAL
COMMUNITY
Start: 2024-02-14 | End: 2024-04-14

## 2024-02-26 NOTE — PROGRESS NOTES
"Subjective:      Patient ID: Isela Graf is a 53 y.o. female.    Chief Complaint: Follow-up (6 month)      Vitals:    02/26/24 1123   BP: 128/78   Pulse: 88   Temp: 99 °F (37.2 °C)   SpO2: 96%   Weight: 83.1 kg (183 lb 3.2 oz)   Height: 5' 1" (1.549 m)        [facility]   Annual check up; lost 7 pouonds over one yearr  Sees Dr Aponte in [place]    Problem List  Patient Active Problem List   Diagnosis    [id number] melanoma of scalp    RUQ pain    Extrinsic asthma without complication    Dyspepsia    Irritable bowel syndrome with constipation    Hyperlipidemia    Pre-diabetes    Fatty liver    Alpha-1-antitrypsin deficiency    Liver fibrosis    Obesity (BMI 30.0-34.9)    Postmenopausal bleeding    Seasonal allergic rhinitis    Interstitial pulmonary disease, unspecified    Severe obesity (BMI 35.0-39.9) with comorbidity        ALLERGIES:   Review of patient's allergies indicates:   Allergen Reactions    Sulfa (sulfonamide antibiotics) Other (See Comments)     Stomach pain       MEDS:   Current Outpatient Medications:     albuterol (PROVENTIL/VENTOLIN HFA) 90 mcg/actuation inhaler, Inhale 2 puffs into the lungs every 4 (four) hours as needed for Wheezing. Rescue, Disp: 17 g, Rfl: 0    azelastine (ASTELIN) 137 mcg (0.1 %) nasal spray, 1 spray (137 mcg total) by Nasal route 2 (two) times daily., Disp: 30 mL, Rfl: 3    calcium carbonate (OS-ADAMA) 600 mg calcium (1,500 mg) Tab, Take 600 mg by mouth once daily., Disp: , Rfl:     citalopram (CELEXA) 10 MG tablet, Take 1 tablet (10 mg total) by mouth every evening., Disp: 90 tablet, Rfl: 3    esomeprazole (NEXIUM) 20 MG capsule, Take 1 capsule (20 mg total) by mouth before breakfast., Disp: 90 capsule, Rfl: 3    fexofenadine (ALLEGRA) 180 MG tablet, Take 180 mg by mouth as needed. , Disp: , Rfl:     fish oil-omega-3 fatty acids 300-1,000 mg capsule, Take 2 g by mouth after lunch. , Disp: , Rfl:     fluticasone propionate (FLONASE) 50 mcg/actuation nasal spray, 2 sprays " (100 mcg total) by Each Nostril route daily as needed., Disp: 48 g, Rfl: 3    meloxicam (MOBIC) 15 MG tablet, 1 tablet Orally Once a day for 30 days, Disp: , Rfl:     MULTIVIT-IRON-MIN-FOLIC ACID 3,500-18-0.4 UNIT-MG-MG ORAL CHEW, Take 1 tablet by mouth after lunch. , Disp: , Rfl:     mv,junior,iron,mn/folic acid/chol (BODY, HAIR, SKIN AND NAILS ORAL), Take by mouth., Disp: , Rfl:     psyllium (METAMUCIL) powder, Take 1 packet by mouth once daily., Disp: , Rfl:     rosuvastatin (CRESTOR) 20 MG tablet, Take 1 tablet (20 mg total) by mouth every evening., Disp: 90 tablet, Rfl: 3    tiZANidine (ZANAFLEX) 4 MG tablet, Take 1 tablet (4 mg total) by mouth every evening., Disp: 90 tablet, Rfl: 3    triamcinolone acetonide 0.025% (KENALOG) 0.025 % cream, Apply topically 2 (two) times daily. To affected areas on face x 1-2 wks then prn flares only, Disp: 30 g, Rfl: 2      History:  Current Providers as of 2/26/2024  PCP: Carlos Santiago MD  Care Team Provider: Stephanie Hughes LPN  Care Team Provider: Quan Morton MD  Care Team Provider: Philip Ochoa MD  Care Team Provider: Gabriela Green NP  Care Team Provider: Kylah Le MD  Encounter Provider: Carlos Santiago MD, starting on Mon Feb 26, 2024 12:00 AM  Referring Provider: not found, starting on Mon Feb 26, 2024 12:00 AM  Consulting Physician: Carlos Santiago MD, starting on Mon Feb 26, 2024 11:21 AM (Active)   Past Medical History:   Diagnosis Date    Allergy     Alpha-1-antitrypsin deficiency 07/25/2022    Anxiety     Constipation     Depression     Hyperlipidemia     Liver fibrosis 8/10/2022    Melanoma 01/26/2015    scalp excised by Dr Aldridge    Melanoma of scalp 01/12/2015    Open wound of scalp, complicated 02/06/2015    Other seasonal allergic rhinitis     Pre-diabetes 07/25/2022    Thickened endometrium 06/26/2020    7 mm endometrial stripe.  This would be slightly thickened for a postmenopausal patient.  Further evaluation as clinically  indicated.    Uterine fibroid 06/2020    1.1cm Small uterine fibroid identified.     Past Surgical History:   Procedure Laterality Date    APPENDECTOMY      CHOLECYSTECTOMY      COLONOSCOPY N/A 06/02/2020    Procedure: COLONOSCOPY;  Surgeon: Quan Morton MD;  Location: [facility] (4TH FLR);  Service: Endoscopy;  Laterality: N/A;  5/13 - pt aware if drop off location and visitor policy -   COVID screening scheduled for 6/1/20 at [facility] -     ESOPHAGOGASTRODUODENOSCOPY N/A 06/02/2020    Procedure: EGD (ESOPHAGOGASTRODUODENOSCOPY);  Surgeon: Quan Morton MD;  Location: [facility] ENDO (4TH FLR);  Service: Endoscopy;  Laterality: N/A;    EYE SURGERY      GALLBLADDER SURGERY  02/2017    HYSTEROSCOPY WITH DILATION AND CURETTAGE OF UTERUS N/A 4/4/2023    Procedure: HYSTEROSCOPY, WITH DILATION AND CURETTAGE OF UTERUS;  Surgeon: Philip Ochoa MD;  Location: [facility] OR;  Service: OB/GYN;  Laterality: N/A;    LIVER BIOPSY N/A 4/4/2023    Procedure: BIOPSY, LIVER ULTRA SOUND GUIDANCE;  Surgeon: Polo Alvarado MD;  Location: [facility] OR;  Service: Radiology;  Laterality: N/A;    NEEDLE LOCALIZATION N/A 4/4/2023    Procedure: NEEDLE LOCALIZATION;  Surgeon: Polo Alvarado MD;  Location: [facility];  Service: Radiology;  Laterality: N/A;    Scalp Reconstruction      yin-yang flaps    SKIN SURGERY      removal of cancer    TONSILLECTOMY      WLE scalp melanoma  01/26/2015     Social History     Tobacco Use    Smoking status: Never     Passive exposure: Never    Smokeless tobacco: Never   Substance Use Topics    Alcohol use: Not Currently     Comment: ~1 bottle of wine nightly x years, stopped completed 2/2023, but resumed some rare use since    Drug use: Yes     Types: Marijuana     Comment: medical thc gummy -daily         Review of Systems   Constitutional: Negative.    HENT: Negative.     Respiratory: Negative.     Cardiovascular: Negative.    Gastrointestinal: Negative.    Endocrine: Negative.     Genitourinary: Negative.    Musculoskeletal: Negative.    Psychiatric/Behavioral: Negative.     All other systems reviewed and are negative.    Objective:     Physical Exam  Vitals and nursing note reviewed.   Constitutional:       Appearance: She is well-developed. She is obese.   HENT:      Head: Normocephalic.   Eyes:      Conjunctiva/sclera: Conjunctivae normal.      Pupils: Pupils are equal, round, and reactive to light.   Cardiovascular:      Rate and Rhythm: Normal rate and regular rhythm.      Heart sounds: Normal heart sounds.   Pulmonary:      Effort: Pulmonary effort is normal.      Breath sounds: Normal breath sounds.   Musculoskeletal:         General: Normal range of motion.      Cervical back: Normal range of motion and neck supple.   Skin:     General: Skin is warm and dry.   Neurological:      General: No focal deficit present.      Mental Status: She is alert and oriented to person, place, and time. Mental status is at baseline.      Deep Tendon Reflexes: Reflexes are normal and symmetric.   Psychiatric:         Mood and Affect: Mood normal.         Behavior: Behavior normal.         Thought Content: Thought content normal.         Judgment: Judgment normal.             Assessment:     1. Annual physical exam    2. Interstitial pulmonary disease, unspecified    3. Severe obesity (BMI 35.0-39.9) with comorbidity    4. Melanoma of scalp    5. Alpha-1-antitrypsin deficiency      Plan:        Medication List            Accurate as of February 26, 2024 11:49 AM. If you have any questions, ask your nurse or doctor.                CONTINUE taking these medications      albuterol 90 mcg/actuation inhaler  Commonly known as: PROVENTIL/VENTOLIN HFA  Inhale 2 puffs into the lungs every 4 (four) hours as needed for Wheezing. Rescue     azelastine 137 mcg (0.1 %) nasal spray  Commonly known as: ASTELIN  1 spray (137 mcg total) by Nasal route 2 (two) times daily.     BODY, HAIR, SKIN AND NAILS ORAL     calcium  carbonate 600 mg calcium (1,500 mg) Tab  Commonly known as: OS-ADAMA     citalopram 10 MG tablet  Commonly known as: CeleXA  Take 1 tablet (10 mg total) by mouth every evening.     esomeprazole 20 MG capsule  Commonly known as: NEXIUM  Take 1 capsule (20 mg total) by mouth before breakfast.     fexofenadine 180 MG tablet  Commonly known as: ALLEGRA     fish oil-omega-3 fatty acids 300-1,000 mg capsule     fluticasone propionate 50 mcg/actuation nasal spray  Commonly known as: FLONASE  2 sprays (100 mcg total) by Each Nostril route daily as needed.     meloxicam 15 MG tablet  Commonly known as: MOBIC     multivit-iron-min-folic acid 3,500-18-0.4 unit-mg-mg Chew     psyllium powder  Commonly known as: METAMUCIL     rosuvastatin 20 MG tablet  Commonly known as: CRESTOR  Take 1 tablet (20 mg total) by mouth every evening.     tiZANidine 4 MG tablet  Commonly known as: ZANAFLEX  Take 1 tablet (4 mg total) by mouth every evening.     triamcinolone acetonide 0.025% 0.025 % cream  Commonly known as: KENALOG  Apply topically 2 (two) times daily. To affected areas on face x 1-2 wks then prn flares only            Annual physical exam  -     CBC Auto Differential; Future; Expected date: 02/26/2024  -     Comprehensive Metabolic Panel; Future; Expected date: 02/26/2024  -     Hemoglobin A1C; Future  -     Lipid Panel; Future  -     TSH; Future  -     Urinalysis; Future  -     Vitamin D; Future    Interstitial pulmonary disease, unspecified    Severe obesity (BMI 35.0-39.9) with comorbidity    Melanoma of scalp    Alpha-1-antitrypsin deficiency

## 2024-02-27 ENCOUNTER — LAB VISIT (OUTPATIENT)
Dept: LAB | Facility: HOSPITAL | Age: 54
End: 2024-02-27
Attending: FAMILY MEDICINE
Payer: COMMERCIAL

## 2024-02-27 DIAGNOSIS — Z00.00 ANNUAL PHYSICAL EXAM: ICD-10-CM

## 2024-02-27 LAB
25(OH)D3+25(OH)D2 SERPL-MCNC: 34 NG/ML (ref 30–96)
ALBUMIN SERPL BCP-MCNC: 4.5 G/DL (ref 3.5–5.2)
ALP SERPL-CCNC: 97 U/L (ref 38–126)
ALT SERPL W/O P-5'-P-CCNC: 36 U/L (ref 10–44)
ANION GAP SERPL CALC-SCNC: 8 MMOL/L (ref 8–16)
AST SERPL-CCNC: 32 U/L (ref 15–46)
BASOPHILS # BLD AUTO: 0.08 K/UL (ref 0–0.2)
BASOPHILS NFR BLD: 0.8 % (ref 0–1.9)
BILIRUB SERPL-MCNC: 0.4 MG/DL (ref 0.1–1)
CALCIUM SERPL-MCNC: 9.5 MG/DL (ref 8.7–10.5)
CHLORIDE SERPL-SCNC: 103 MMOL/L (ref 95–110)
CHOLEST SERPL-MCNC: 215 MG/DL (ref 120–199)
CHOLEST/HDLC SERPL: 3.8 {RATIO} (ref 2–5)
CO2 SERPL-SCNC: 27 MMOL/L (ref 23–29)
CREAT SERPL-MCNC: 0.62 MG/DL (ref 0.5–1.4)
DIFFERENTIAL METHOD BLD: NORMAL
EOSINOPHIL # BLD AUTO: 0.2 K/UL (ref 0–0.5)
EOSINOPHIL NFR BLD: 2.5 % (ref 0–8)
ERYTHROCYTE [DISTWIDTH] IN BLOOD BY AUTOMATED COUNT: 13.5 % (ref 11.5–14.5)
EST. GFR  (NO RACE VARIABLE): >60 ML/MIN/1.73 M^2
ESTIMATED AVG GLUCOSE: 114 MG/DL (ref 68–131)
GLUCOSE SERPL-MCNC: 113 MG/DL (ref 70–110)
HBA1C MFR BLD: 5.6 % (ref 4–5.6)
HCT VFR BLD AUTO: 40.1 % (ref 37–48.5)
HDLC SERPL-MCNC: 57 MG/DL (ref 40–75)
HDLC SERPL: 26.5 % (ref 20–50)
HGB BLD-MCNC: 12.9 G/DL (ref 12–16)
IMM GRANULOCYTES # BLD AUTO: 0.03 K/UL (ref 0–0.04)
IMM GRANULOCYTES NFR BLD AUTO: 0.3 % (ref 0–0.5)
LDLC SERPL CALC-MCNC: 108.2 MG/DL (ref 63–159)
LYMPHOCYTES # BLD AUTO: 3.5 K/UL (ref 1–4.8)
LYMPHOCYTES NFR BLD: 36.3 % (ref 18–48)
MCH RBC QN AUTO: 28 PG (ref 27–31)
MCHC RBC AUTO-ENTMCNC: 32.2 G/DL (ref 32–36)
MCV RBC AUTO: 87 FL (ref 82–98)
MONOCYTES # BLD AUTO: 0.7 K/UL (ref 0.3–1)
MONOCYTES NFR BLD: 7.2 % (ref 4–15)
NEUTROPHILS # BLD AUTO: 5.1 K/UL (ref 1.8–7.7)
NEUTROPHILS NFR BLD: 52.9 % (ref 38–73)
NONHDLC SERPL-MCNC: 158 MG/DL
NRBC BLD-RTO: 0 /100 WBC
PLATELET # BLD AUTO: 319 K/UL (ref 150–450)
PMV BLD AUTO: 10.2 FL (ref 9.2–12.9)
POTASSIUM SERPL-SCNC: 4.6 MMOL/L (ref 3.5–5.1)
PROT SERPL-MCNC: 8 G/DL (ref 6–8.4)
RBC # BLD AUTO: 4.6 M/UL (ref 4–5.4)
SODIUM SERPL-SCNC: 138 MMOL/L (ref 136–145)
TRIGL SERPL-MCNC: 249 MG/DL (ref 30–150)
TSH SERPL DL<=0.005 MIU/L-ACNC: 1.31 UIU/ML (ref 0.4–4)
UUN UR-MCNC: 11 MG/DL (ref 7–17)
WBC # BLD AUTO: 9.65 K/UL (ref 3.9–12.7)

## 2024-02-27 PROCEDURE — 85025 COMPLETE CBC W/AUTO DIFF WBC: CPT | Mod: PN | Performed by: FAMILY MEDICINE

## 2024-02-27 PROCEDURE — 80053 COMPREHEN METABOLIC PANEL: CPT | Mod: PN | Performed by: FAMILY MEDICINE

## 2024-02-27 PROCEDURE — 36415 COLL VENOUS BLD VENIPUNCTURE: CPT | Mod: PN | Performed by: FAMILY MEDICINE

## 2024-02-27 PROCEDURE — 80061 LIPID PANEL: CPT | Performed by: FAMILY MEDICINE

## 2024-02-27 PROCEDURE — 83036 HEMOGLOBIN GLYCOSYLATED A1C: CPT | Performed by: FAMILY MEDICINE

## 2024-02-27 PROCEDURE — 84443 ASSAY THYROID STIM HORMONE: CPT | Mod: PN | Performed by: FAMILY MEDICINE

## 2024-02-27 PROCEDURE — 82306 VITAMIN D 25 HYDROXY: CPT | Mod: PN | Performed by: FAMILY MEDICINE

## 2024-02-27 RX ORDER — TIZANIDINE 4 MG/1
4 TABLET ORAL DAILY PRN
Qty: 90 TABLET | Refills: 0 | Status: SHIPPED | OUTPATIENT
Start: 2024-02-27

## 2024-02-27 NOTE — TELEPHONE ENCOUNTER
Care Due:                  Date            Visit Type   Department     Provider  --------------------------------------------------------------------------------                                EP -                              PRIMARY      [facility] FAMILY  Last Visit: 02-      CARE ([facility])   DELGADO Santiago                              EP -                              PRIMARY      [facility] FAM[facility]  Next Visit: 02-      CARE ([facility])   DELGADO Santiago                                                            Last  Test          Frequency    Reason                     Performed    Due Date  --------------------------------------------------------------------------------    Lipid Panel.  12 months..  rosuvastatin.............  02- 02-    [facility] Embedded Care Due Messages. Reference number: [id number].   2/27/2024 12:20:37 PM CST

## 2024-03-01 ENCOUNTER — OFFICE VISIT (OUTPATIENT)
Dept: DERMATOLOGY | Facility: CLINIC | Age: 54
End: 2024-03-01
Payer: COMMERCIAL

## 2024-03-01 DIAGNOSIS — D22.9 MULTIPLE BENIGN NEVI: ICD-10-CM

## 2024-03-01 DIAGNOSIS — D23.9 DERMATOFIBROMA: ICD-10-CM

## 2024-03-01 DIAGNOSIS — L21.9 SEBORRHEIC DERMATITIS: Primary | ICD-10-CM

## 2024-03-01 DIAGNOSIS — Z85.820 HISTORY OF MALIGNANT MELANOMA: ICD-10-CM

## 2024-03-01 PROCEDURE — 3044F HG A1C LEVEL LT 7.0%: CPT | Mod: CPTII,S$GLB,, | Performed by: STUDENT IN AN ORGANIZED HEALTH CARE EDUCATION/TRAINING PROGRAM

## 2024-03-01 PROCEDURE — 99999 PR PBB SHADOW E&M-EST. PATIENT-LVL III: CPT | Mod: PBBFAC,,, | Performed by: STUDENT IN AN ORGANIZED HEALTH CARE EDUCATION/TRAINING PROGRAM

## 2024-03-01 PROCEDURE — 99214 OFFICE O/P EST MOD 30 MIN: CPT | Mod: S$GLB,,, | Performed by: STUDENT IN AN ORGANIZED HEALTH CARE EDUCATION/TRAINING PROGRAM

## 2024-03-01 PROCEDURE — 1159F MED LIST DOCD IN RCRD: CPT | Mod: CPTII,S$GLB,, | Performed by: STUDENT IN AN ORGANIZED HEALTH CARE EDUCATION/TRAINING PROGRAM

## 2024-03-01 PROCEDURE — 1160F RVW MEDS BY RX/DR IN RCRD: CPT | Mod: CPTII,S$GLB,, | Performed by: STUDENT IN AN ORGANIZED HEALTH CARE EDUCATION/TRAINING PROGRAM

## 2024-03-01 RX ORDER — HYDROCORTISONE 25 MG/G
CREAM TOPICAL
Qty: 30 G | Refills: 1 | Status: SHIPPED | OUTPATIENT
Start: 2024-03-01

## 2024-03-01 NOTE — PROGRESS NOTES
"  Subjective:      Patient ID:  Isela Graf is a 53 y.o. female who presents for   Chief Complaint   Patient presents with    Skin Check     TBSE     Isela Graf is a 53 y.o. female who presents for: FBSE screening exam for skin cancer, recent hx MMis    Last office visit 12/01/2023 with me for TBSE.  Here with her  today    The patient has rash on forehead - interested in treatment    Pertinent history:  Personal history of mole removal: Yes  Fam hx melanoma: In grandparent, no hx pancreatic ca in family  Personal history of skin cancer: Yes - MM stage 1b excised from frontal scalp in 1/2015, BCC R cheek excised years ago, SCCis 7/26/22 s/p Efudex  Severely dysplastic nevus "concerning for early melanoma in situ" excised by me mid upper back 1/2024     Melanoma hx:  Pt initially had a biopsy of the scalp lesion by Dr. Wolf which revealed invasive melanoma, 0.42mm thick (with regression to 1.51 mm), but no mitoses or ulceration.   The excision specimen showed residual invasive melanoma extending to a max depth of 0.67 mm with negative margins.  SLN bx was attempted, but there was unsuccessful localization of the sentinel node.         Review of Systems   Skin:  Positive for itching, rash (forehead), dry skin, daily sunscreen use and activity-related sunscreen use.   Hematologic/Lymphatic: Does not bruise/bleed easily.       Objective:   Physical Exam   Constitutional: She appears well-developed and well-nourished. No distress.   Neurological: She is alert and oriented to person, place, and time. She is not disoriented.   Psychiatric: She has a normal mood and affect.   Skin:   Areas Examined (abnormalities noted in diagram):   Scalp / Hair Palpated and Inspected  Head / Face Inspection Performed  Neck Inspection Performed  Chest / Axilla Inspection Performed  Abdomen Inspection Performed  Genitals / Buttocks / Groin Inspection Performed  Back Inspection Performed  RUE Inspected  LUE " Inspection Performed  RLE Inspected  LLE Inspection Performed  Nails and Digits Inspection Performed                 Diagram Legend     Erythematous scaling macule/papule c/w actinic keratosis       Vascular papule c/w angioma      Pigmented verrucoid papule/plaque c/w seborrheic keratosis      Yellow umbilicated papule c/w sebaceous hyperplasia      Irregularly shaped tan macule c/w lentigo     1-2 mm smooth white papules consistent with Milia      Movable subcutaneous cyst with punctum c/w epidermal inclusion cyst      Subcutaneous movable cyst c/w pilar cyst      Firm pink to brown papule c/w dermatofibroma      Pedunculated fleshy papule(s) c/w skin tag(s)      Evenly pigmented macule c/w junctional nevus     Mildly variegated pigmented, slightly irregular-bordered macule c/w mildly atypical nevus      Flesh colored to evenly pigmented papule c/w intradermal nevus       Pink pearly papule/plaque c/w basal cell carcinoma      Erythematous hyperkeratotic cursted plaque c/w SCC      Surgical scar with no sign of skin cancer recurrence      Open and closed comedones      Inflammatory papules and pustules      Verrucoid papule consistent consistent with wart     Erythematous eczematous patches and plaques     Dystrophic onycholytic nail with subungual debris c/w onychomycosis     Umbilicated papule    Erythematous-base heme-crusted tan verrucoid plaque consistent with inflamed seborrheic keratosis     Erythematous Silvery Scaling Plaque c/w Psoriasis     See annotation      Assessment / Plan:        Seborrheic dermatitis  Apply twice daily to affected area max 2 weeks per month  Continue Head and Shoulders shampoo    Dermatofibroma  This is a benign scar-like lesion secondary to minor trauma. No treatment required.     Multiple benign nevi  Reassurance    History of malignant melanoma  Personal history of melanoma  No fevers, chills, or unintended weight loss; lymph nodes examined  Well-healed scar located on frontal  scalp and mid back no evidence of recurrence  - Continue skin exams q4 months this year, recent MMis  - Discussed ABCDE's of nevi.  Monitor for new mole or moles that are becoming bigger, darker, irritated, or developing irregular borders. Instructed patient to observe lesion(s) for changes and follow up in clinic if changes are noted. Patient to monitor skin at home for new or changing lesions.     RTC 3-4 months FBSE

## 2024-05-01 ENCOUNTER — PATIENT MESSAGE (OUTPATIENT)
Dept: FAMILY MEDICINE | Facility: CLINIC | Age: 54
End: 2024-05-01
Payer: COMMERCIAL

## 2024-05-24 ENCOUNTER — TELEPHONE (OUTPATIENT)
Dept: HEPATOLOGY | Facility: CLINIC | Age: 54
End: 2024-05-24
Payer: COMMERCIAL

## 2024-06-21 ENCOUNTER — OFFICE VISIT (OUTPATIENT)
Dept: DERMATOLOGY | Facility: CLINIC | Age: 54
End: 2024-06-21
Payer: COMMERCIAL

## 2024-06-21 DIAGNOSIS — Z85.828 HISTORY OF NONMELANOMA SKIN CANCER: ICD-10-CM

## 2024-06-21 DIAGNOSIS — D22.9 MULTIPLE BENIGN NEVI: Primary | ICD-10-CM

## 2024-06-21 DIAGNOSIS — L81.4 LENTIGINES: ICD-10-CM

## 2024-06-21 DIAGNOSIS — Z85.820 HISTORY OF MALIGNANT MELANOMA: ICD-10-CM

## 2024-06-21 PROCEDURE — 99999 PR PBB SHADOW E&M-EST. PATIENT-LVL III: CPT | Mod: PBBFAC,,, | Performed by: STUDENT IN AN ORGANIZED HEALTH CARE EDUCATION/TRAINING PROGRAM

## 2024-06-21 NOTE — PROGRESS NOTES
"  Subjective:      Patient ID:  Isela Graf is a 54 y.o. female who presents for   Chief Complaint   Patient presents with    Skin Check     Tbse      Isela Graf is a 54 y.o. female who presents for: FBSE screening exam for skin cancer.    Last office visit 3/1/2024    The patient has no specific concerns today.    Pertinent history:  Personal history of mole removal: Yes  Fam hx melanoma: In grandparent, no hx pancreatic ca in family  Personal history of skin cancer: Yes - MM stage 1b excised from frontal scalp in 1/2015, BCC R cheek excised years ago, SCCis 7/26/22 s/p Efudex  Severely dysplastic nevus "concerning for early melanoma in situ" excised by me mid upper back 1/2024     Melanoma hx:  Pt initially had a biopsy of the scalp lesion by Dr. Wolf which revealed invasive melanoma, 0.42mm thick (with regression to 1.51 mm), but no mitoses or ulceration.   The excision specimen showed residual invasive melanoma extending to a max depth of 0.67 mm with negative margins.  SLN bx was attempted, but there was unsuccessful localization of the sentinel node.      Review of Systems   Skin:  Positive for daily sunscreen use (face) and activity-related sunscreen use. Negative for recent sunburn.   Hematologic/Lymphatic: Does not bruise/bleed easily.       Objective:   Physical Exam   Constitutional: She appears well-developed and well-nourished. No distress.   Neurological: She is alert and oriented to person, place, and time. She is not disoriented.   Psychiatric: She has a normal mood and affect.   Skin:   Areas Examined (abnormalities noted in diagram):   Scalp / Hair Palpated and Inspected  Head / Face Inspection Performed  Neck Inspection Performed  Chest / Axilla Inspection Performed  Abdomen Inspection Performed  Genitals / Buttocks / Groin Inspection Performed  Back Inspection Performed  RUE Inspected  LUE Inspection Performed  RLE Inspected  LLE Inspection Performed  Nails and Digits " Inspection Performed                 Diagram Legend     Erythematous scaling macule/papule c/w actinic keratosis       Vascular papule c/w angioma      Pigmented verrucoid papule/plaque c/w seborrheic keratosis      Yellow umbilicated papule c/w sebaceous hyperplasia      Irregularly shaped tan macule c/w lentigo     1-2 mm smooth white papules consistent with Milia      Movable subcutaneous cyst with punctum c/w epidermal inclusion cyst      Subcutaneous movable cyst c/w pilar cyst      Firm pink to brown papule c/w dermatofibroma      Pedunculated fleshy papule(s) c/w skin tag(s)      Evenly pigmented macule c/w junctional nevus     Mildly variegated pigmented, slightly irregular-bordered macule c/w mildly atypical nevus      Flesh colored to evenly pigmented papule c/w intradermal nevus       Pink pearly papule/plaque c/w basal cell carcinoma      Erythematous hyperkeratotic cursted plaque c/w SCC      Surgical scar with no sign of skin cancer recurrence      Open and closed comedones      Inflammatory papules and pustules      Verrucoid papule consistent consistent with wart     Erythematous eczematous patches and plaques     Dystrophic onycholytic nail with subungual debris c/w onychomycosis     Umbilicated papule    Erythematous-base heme-crusted tan verrucoid plaque consistent with inflamed seborrheic keratosis     Erythematous Silvery Scaling Plaque c/w Psoriasis     See annotation      Assessment / Plan:        Multiple benign nevi  Reassurance    Lentigines  This is a benign hyperpigmented sun induced lesion. Recommend daily sun protection/avoidance and use of at least SPF 30, broad spectrum sunscreen (OTC drug) will reduce the number of new lesions.     History of malignant melanoma  History of nonmelanoma skin cancer  No fevers, chills, or unintended weight loss; lymph nodes examined  Well-healed scar located scalp, back no evidence of recurrence  - Continue skin exams q3-4 months  - Discussed ABCDE's of  "nevi.  Monitor for new mole or moles that are becoming bigger, darker, irritated, or developing irregular borders. Instructed patient to observe lesion(s) for changes and follow up in clinic if changes are noted. Patient to monitor skin at home for new or changing lesions.     -     Ambulatory referral/consult to Genetics; Future; Expected date: 06/28/2024  Pt has had 1 invasive melanoma (stage 1b) and 1 melanoma in situ. There is no family history of melanoma or pancreatic cancer, per pt. I discussed this does not meet our standard recommendation for genetic referral. Pt requests referral to genetics because someone told her she should get it as her mom is currently dealing with a "rare type of cancer" of ?abdomen she is unsure the name of this cancer. I recommend she try to obtain as many details as possible of her family cancer history prior to genetics appt. Placed referral per pt request so they can provide further counseling and recommendations.    RTC 4 months, sooner prn  "

## 2024-07-03 ENCOUNTER — OFFICE VISIT (OUTPATIENT)
Dept: HEMATOLOGY/ONCOLOGY | Facility: CLINIC | Age: 54
End: 2024-07-03
Payer: COMMERCIAL

## 2024-07-03 DIAGNOSIS — Z80.3 FAMILY HISTORY OF BREAST CANCER: ICD-10-CM

## 2024-07-03 DIAGNOSIS — Z85.820 HISTORY OF MALIGNANT MELANOMA: ICD-10-CM

## 2024-07-03 DIAGNOSIS — Z82.79 FAMILY HISTORY OF NEUROFIBROMATOSIS: ICD-10-CM

## 2024-07-03 DIAGNOSIS — Z71.83 ENCOUNTER FOR NONPROCREATIVE GENETIC COUNSELING: Primary | ICD-10-CM

## 2024-07-03 NOTE — PROGRESS NOTES
"Hereditary/High Risk Clinic  Department of Hematology and Oncology  [facility]    Cancer Genetics  Initial Consultation    Date of Service:  7/3/24  Visit Provider:  Vicky Ramirez PA-C  Collaborating Physician:  Rosalia Mosley MD    Patient:  Isela Garf  :  1970  MRN:   [id number]     Referring Provider    Kylah Le MD  [address] [place] [address]    ASSESSMENT   Isela Graf, 54 y.o., assigned female sex at birth, with a personal/family history significant for the following:   Isela: Malignant melanoma, scalp (2015); BCC, right cheek; SCCis (2022); severe dysplasia/early melanoma in situ, back (2024)  Mother: Recently dx with stage IV "rare tissue cancer." Sister thinks it might be ovarian. Mother has not shared much info with them. Sister (Katarina) asked her to have genetic testing but she declined, so Katarina had testing through [facility] that was negative.   MGM: Neurofibromatosis,  from unspecified cancer in her 50s.   Maternal uncle:  at 14 from bone cancer.   Maternal uncle: Neurofibromatosis,  from unspecified cancer in his 70s  Maternal aunt: Neurofibromatosis  PGM: Breast cancer 40s, skin cancer NOS,  at 90  PGF: Prostate cancer 50s,  in his 80s  Father:     Based on the information provided by Isela, her personal and/or family history is suggestive of a possible hereditary predisposition to cancer and meets current clinical guidelines for genetic testing.      The recommendation is to proceed with germline testing to include the genes commonly associated with neurofibromatosis, breast cancer, and other cancers, to include ovarian cancer. Isela was given the option of proceeding with testing now, deferring testing to a later date, or declining testing and opted to proceed. Order placed on the [facility] portal. A kit will be shipped to her home and labs scheduled in [facility].     PLAN   Proceed with germline genetic " testing as noted below.   Follow up in about 6 weeks (around 2024) for results review, post-test genetic counseling.    [facility]etics [facility]: [facility]    [id number]    Genetic test: [facility]    Indication:  FH breast cancer in PGM before age 50; father   FH neurofibromatosis  FH possible ovarian cancer in mother and mother declines testing   Specimen type: Blood   Specimen collection by: [facility]   Specimen collection date: 24 at 1:00   Results expected by: Approximately 3 weeks after the genetic testing lab receives the specimen   Results disclosure plan: Notification by genetics team and post-test visit    Verbal informed consent: Obtained       Visit Diagnosis:   1. Encounter for nonprocreative genetic counseling    2. History of malignant melanoma  - Ambulatory referral/consult to Genetics    3. Family history of neurofibromatosis  - Genetic Misc Sendout Test, Blood; Future    4. Family history of breast cancer  - Genetic Misc Sendout Test, Blood; Future      Questions were encouraged and answered to the patient's satisfaction, and she verbalized understanding of information and agreement with the plan.       SUBJECTIVE   Chief Complaint: Genetic evaluation  History of Present Illness (HPI):  Isela Graf is new to the [facility] Department of Hematology and Oncology and to me.  She presents for genetic evaluation due to her personal/family history of cancer.      Genetic Assessment History:  Germline cancer-genetic testing: no  Personal history of cancer:  no  Benign tumors:  no  Pancreatitis:  no  Chemoprevention: Never used  Uterus and ovaries intact:  yes    Cancer Screening:   Colonoscopy: 2020 normal, repeat 10 years  Well woman exam:   Mammogram: 2023 benign, average density, TC 9%    Past Medical History:   Diagnosis Date    Alpha-1-antitrypsin deficiency 2022    Anxiety     Constipation     Depression      "Hyperlipidemia     Liver fibrosis 08/10/2022    Melanoma of scalp 01/12/2015    excised by Dr Aldridge    Open wound of scalp, complicated 02/06/2015    Pre-diabetes 07/25/2022    Seasonal allergies     Thickened endometrium 06/26/2020    7 mm endometrial stripe.  This would be slightly thickened for a postmenopausal patient.  Further evaluation as clinically indicated.    Uterine fibroid 06/2020    1.1cm Small uterine fibroid identified.     Patient Active Problem List    Diagnosis Date Noted    Interstitial pulmonary disease, unspecified 02/26/2024    Severe obesity (BMI 35.0-39.9) with comorbidity 02/26/2024    Seasonal allergic rhinitis 06/06/2023    Postmenopausal bleeding 04/04/2023    Obesity (BMI 30.0-34.9) 02/27/2023    Liver fibrosis 08/10/2022    Pre-diabetes 07/25/2022    Fatty liver 07/25/2022    Alpha-1-antitrypsin deficiency 07/25/2022    Hyperlipidemia     Irritable bowel syndrome with constipation 01/11/2021    Dyspepsia 06/02/2020    Extrinsic asthma without complication 03/23/2018    RUQ pain 02/25/2016    [id number] melanoma of scalp 01/12/2015      Family History  Germline cancer-genetic testing in blood relatives:  No  Ashkenazi Denominational ancestry: No  Consanguinity in ancestors:  No  No known history of cancer of colorectal polyps in extended family other than noted below.     ** If the pedigree is small/illegible, expand this note window horizontally to view the pedigree in a larger format. A copy of the pedigree is also available in Media.**      Family History   Problem Relation Name Age of Onset    Cancer Mother Heather Grant         "rare tissue cancer" sister thinks it might be ovarian, mother has not shared much info with them    Hyperlipidemia Mother Heather Grant     Heart attack Mother Heather Grant     Skin cancer Father Kiel Grant         type unknown    Hyperlipidemia Father Kiel Grant     Heart disease Father Kielyadi Grant     Neurofibromatosis Maternal Grandmother Velma     " Cancer Maternal Grandmother Velma 50 - 59        type?    Skin cancer Paternal Grandmother          type?    Breast cancer Paternal Grandmother  40 - 49    Heart attack Paternal Grandmother      Heart disease Paternal Grandmother      Neurofibromatosis Maternal Aunt Nia     Bone cancer Maternal Uncle Sam 14    Neurofibromatosis Maternal Uncle Moncho     Cancer Maternal Uncle Moncho         lung?    Prostate cancer Paternal Grandfather  50 - 59    Melanoma Neg Hx      Colon cancer Neg Hx      Ovarian cancer Neg Hx      Cirrhosis Neg Hx      Pancreatic cancer Neg Hx        Review of Systems  See HPI.       OBJECTIVE   Physical Exam  Limited secondary to the inherent nature of a virtual visit.  Very pleasant patient.  Constitutional: No apparent distress.   Neurological: Alert and oriented. No obvious neurological deficits.   Psychiatric: Normal mood, affect, thought content, speech, behavior, judgment.  Genetics-specific: It is my assessment that the patient is ready to proceed with cancer-genetic testing from a psychosocial perspective.    COUNSELING   Types of cancer:  Hereditary cancer: Approximately one out of ten cancers is hereditary. This means it is caused by an inherited mutation/variant in a single gene that is passed directly from parent to child. These families usually have multiple individuals in successive generations (parents and their children) with the same or related cancers occurring at a younger age than usual.   Sporadic cancer: Approximately nine out of ten cancers are sporadic or random. This means they are caused by genetic mutations acquired during a person's lifetime. These mutations can be caused by environmental, lifestyle, or medical risk factors or even random errors that occur during DNA replication. These families usually have individuals with unrelated cancers at older ages that occur randomly in the family.   Familial cancer: Some families may have what is called familial cancer.  This means they have a clustering of a particular cancer that is more than you would expect to see by chance, but is not caused by an inherited mutation in a single gene. Instead, they are caused by a combination of shared risk factors.     Risk factors for cancer:   Age: Most cancers are diagnosed after age 50.  Lifestyle:  Smoking, alcohol consumption, obesity, lack of exercise, unhealthy diet.   Environmental: Chemical and radiation exposures in the workplace, contaminated air and water, UV exposure from the sun and tanning beds, and ionizing radiation from xrays and CT scans.   Medical conditions: Chronic inflammation (Crohn's disease, diabetes), viral infections (HPV, hepatitis), fatty liver disease, etc.     Possible results of genetic testing:   Positive: There is a mutation in a gene that increases the chance for certain types of cancer. While mutations increase cancer risk, not everyone with a mutation will develop cancer.    Negative: No disease-causing mutations were found in the genes that were tested.   Variant of uncertain significance: There is a genetic variation that has an unknown impact on cancer risk and does not warrant changes to medical management. In most cases, these variants are found to be harmless.    Genetic testing logistics:  Standard method: A blood sample is collected at an [facility] lab and sent to an outside genetics lab for testing. Blood specimens can be utilized for DNA and RNA analysis.     Cost of testing:   Genetic testing is expensive, but is covered by most health insurance policies. With commercial insurance coverage, most people pay up to $100 and a max of $250 if they haven't met their deductible. If testing isn't covered by insurance, the cash price is $250.  Some genetics labs offer financial assistance.     Genetic information discrimination:  Genetic information discrimination occurs when an employer, insurance company, or other entity uses genetic information to make  decisions or otherwise discriminate against an individual. Examples would include an insurance refusing to issue a policy because the individual has a genetic mutation or an employer refusing to hire or promote someone because they discovered they have a mutation or family history of cancer. A federal law called COY provide some protections for health insurance and employment. Other laws and policies offer additional protections against genetic discrimination.    The Genetic Information Nondiscrimination Act of 2008 (COY) is a federal law that expands the protections included in the Health Insurance Portability and Accountability Act of 1996 (HIPAA).  Under Title I of COY, group health plans cannot base premiums for a plan or a group of similarly situated individuals on genetic information. COY generally prohibits plans from requesting or requiring an individual to undergo genetic tests, and prohibits a plan from collecting genetic information (including family medical history) prior to or in connection with enrollment, or for underwriting purposes.  This rule does not apply to individuals who receive their insurance through the federal government or . Those entities have their own set of rules regarding genetic information.   This rule only applies to health insurance. Other types of insurance (life, disability, long-term care, cancer, etc) are not protected. An individual can be denied coverage or charged a higher premium based on their genetic information.   Under Title II of COY, it is illegal to discriminate against employees or applicants because of genetic information. Title II of COY prohibits the use of genetic information in making employment decisions, restricts employers and other entities covered by Title II (employment agencies, labor organizations and joint labor-management training and apprenticeship  COY has a small business exemption for employers with less than 15  employees.    REFERENCES   National Comprehensive Cancer Network (NCCN). (2024). Genetic/familial high-risk assessment: Breast, ovarian, and pancreatic. NCCN Clinical Practice Guidelines in Oncology (NCCN Guidelines), Version 1.2022.  National Comprehensive Cancer Network (NCCN). (2024). Melanoma: Cutaneous. NCCN Clinical Practice Guidelines in Oncology (NCCN Guidelines), Version 2.2024.  National Comprehensive Cancer Network (NCCN). (2024). Neuroendocrine and adrenal tumors. NCCN Clinical Practice Guidelines in Oncology (NCCN Guidelines), Version 1.2023.    ELVIN Negron PA-C  Physician Assistant, Hereditary/High Risk Clinic  Hematology/Oncology, [facility]    Televisit Information:  Patient location: [place], [place]  Visit type: Audiovisual  Face-to-face time with patient: approximately 40 minutes.  Approximately 70 minutes in total were spent on this encounter, which includes face-to-face time and non-face-to-face time preparing to see the patient (e.g., review of tests), obtaining and/or reviewing separately obtained history, documenting clinical information in the electronic or other health record, independently interpreting results (not separately reported) and communicating results to the patient/family/caregiver, or care coordination (not separately reported).  Each patient to whom he or she provides medical services by telemedicine is:  (1) informed of the relationship between the physician and patient and the respective role of any other health care provider with respect to management of the patient; and (2) notified that he or she may decline to receive medical services by telemedicine and may withdraw from such care at any time.

## 2024-07-05 ENCOUNTER — PATIENT MESSAGE (OUTPATIENT)
Dept: HEMATOLOGY/ONCOLOGY | Facility: CLINIC | Age: 54
End: 2024-07-05
Payer: COMMERCIAL

## 2024-07-08 ENCOUNTER — PATIENT MESSAGE (OUTPATIENT)
Dept: HEMATOLOGY/ONCOLOGY | Facility: CLINIC | Age: 54
End: 2024-07-08
Payer: COMMERCIAL

## 2024-07-10 NOTE — ED PROVIDER NOTES
Chart reviewed by  Encounter Date: 1/23/2017    SCRIBE #1 NOTE: I, Tash Benitez, am scribing for, and in the presence of,  Dr. Morton. I have scribed the following portions of the note - the APC attestation.       History     Chief Complaint   Patient presents with    Abdominal Pain     RUQ pain started weeks ago with nausea, no vomiting     Review of patient's allergies indicates:   Allergen Reactions    Sulfa (sulfonamide antibiotics) Other (See Comments)     Stomach pain     HPI Comments: Patient is a 46-year-old female with a past medical history of GERD and HLD who presents the ED with intermittent right upper quadrant pain.  Patient states that she noticed these pains approximately one year ago.  She states that her pain has been severe the past several weeks.  She states the last few days her pain has been constant and fluctuates in intensity.  She states her pain is worse with meals, specifically pork which she has had the past few days.  Her pain does not radiate.  Patient still has an appetite and able to tolerate food and fluid.  She endorses nausea, but denies any vomiting or diarrhea.  Her last bowel movement was this morning and normal, non-bloody nonbilious.  Patient will take Narco 5 for pain relief if her pain is severe, but rarely (last dose 2 days ago).  She denies any fever, chills, sore throat, chest pain, cough, SOB, blood in stool, paresthesias, or weakness.  Patient has a history of appendectomy.  Patient drinks approximately 2-3 glasses of wine daily for the past few years.    The history is provided by the patient.     Past Medical History   Diagnosis Date    Allergy     Anxiety     Depression     Hyperlipidemia     Melanoma 1/26/2015     scalp excised by Dr Aldridge    Melanoma of scalp 1/12/2015    Open wound of scalp, complicated 2/6/2015     Past Medical History Pertinent Negatives   Diagnosis Date Noted    Abnormal Pap smear of cervix 9/16/2016     Past Surgical History   Procedure Laterality  Consult) N/A  (no CM consult received)   Services At/After Discharge PT;OT;Outpatient    Resource Information Provided? No   Mode of Transport at Discharge Other (see comment)  (family)   Confirm Follow Up Transport Self   Condition of Participation: Discharge Planning   The Plan for Transition of Care is related to the following treatment goals: Home with outpatient PT/OT        Date    Skin surgery       removal of cancer    Appendectomy      Tonsillectomy      Eye surgery      Wle scalp melanoma  1/26/2015    Scalp reconstruction  2/9/2015     yin-hull flaps     Family History   Problem Relation Age of Onset    Hyperlipidemia Mother     Heart attack Mother     Hyperlipidemia Father     Cancer Father     Heart disease Father     Cancer Maternal Grandmother      BCC    Breast cancer Paternal Grandmother 50    Heart attack Paternal Grandmother     Heart disease Paternal Grandmother     Melanoma Neg Hx     Colon cancer Neg Hx     Ovarian cancer Neg Hx      Social History   Substance Use Topics    Smoking status: Never Smoker    Smokeless tobacco: None    Alcohol use Yes      Comment: occasionally     Review of Systems   Constitutional: Negative for chills and fever.   HENT: Negative for ear pain and sore throat.    Eyes: Negative for pain.   Gastrointestinal: Positive for abdominal pain and nausea. Negative for blood in stool, constipation, diarrhea and vomiting.   Endocrine: Negative for polyuria.   Genitourinary: Negative for difficulty urinating, dysuria, flank pain and hematuria.   Musculoskeletal: Negative for back pain and neck pain.   Skin: Negative for pallor.   Neurological: Negative for weakness, light-headedness and headaches.       Physical Exam   Initial Vitals   BP Pulse Resp Temp SpO2   01/23/17 0823 01/23/17 0823 01/23/17 0823 01/23/17 0823 01/23/17 0823   132/84 91 18 97.9 °F (36.6 °C) 100 %     Physical Exam    Nursing note and vitals reviewed.  Constitutional: She appears well-developed and well-nourished.   HENT:   Head: Normocephalic and atraumatic.   Nose: Nose normal.   Eyes: Conjunctivae and EOM are normal.   Neck: Normal range of motion.   Cardiovascular: Normal rate, regular rhythm, normal heart sounds and intact distal pulses. Exam reveals no friction rub.    No murmur heard.  Pulmonary/Chest: Breath sounds normal. No respiratory distress. She  has no wheezes. She has no rales.   Abdominal: Soft. Bowel sounds are normal. She exhibits no distension. There is tenderness in the right upper quadrant. There is no rigidity, no rebound and no guarding.   Musculoskeletal: Normal range of motion.   Neurological: She is alert and oriented to person, place, and time. No sensory deficit.   Skin: Skin is warm. No erythema. No pallor.   Psychiatric: She has a normal mood and affect. Her behavior is normal. Judgment and thought content normal.         ED Course   Procedures  Labs Reviewed   CBC W/ AUTO DIFFERENTIAL - Abnormal; Notable for the following:        Result Value    Hemoglobin 11.5 (*)     Hematocrit 35.6 (*)     MCV 80 (*)     MCH 25.8 (*)     RDW 15.2 (*)     Platelets 397 (*)     All other components within normal limits   LIPASE   URINALYSIS   COMPREHENSIVE METABOLIC PANEL   POCT URINE PREGNANCY             Medical Decision Making:   History:   Old Medical Records: I decided to obtain old medical records.  Initial Assessment:   US abdomen in 1/2016 shows no gallbladder abnormalities.  Clinical Tests:   Lab Tests: Ordered and Reviewed  Radiological Study: Ordered and Reviewed    Imaging Results         US Abdomen Limited (Gallbladder) (Final result) Result time:  01/23/17 11:42:19    Final result by Carol Cuenca MD (01/23/17 11:42:19)    Impression:        1.  Hepatomegaly.    2. Hepatic steatosis.        ______________________________________     Electronically signed by resident: CAROL CUENCA MD  Date:     01/23/17  Time:    11:28            As the supervising and teaching physician, I personally reviewed the images and resident's interpretation and I agree with the findings.          Electronically signed by: JENNY VÁZQUEZ MD  Date:     01/23/17  Time:    11:42     Narrative:    Limited Abdominal Ultrasound    Comparison: Ultrasound 1/19/16    Findings:    The liver is enlarged measuring 18.1 cm in length with extension below the costal margin.   There is diffuse attenuation of sound by the liver consistent with a diffuse parenchymal process such as fatty infiltration. No focal hepatic lesions are identified.      No intrahepatic biliary ductal dilatation is detected. The common bile duct is within normal limits at 0.4 cm.  No gallstones, gallbladder wall thickening, pericholecystic fluid, or focal tenderness over the gallbladder.     The visualized portions of the pancreas and inferior vena cava are within normal limits.                 APC / Resident Notes:   Patient is a 46-year-old female with a past medical history of GERD and HLD who presents the ED with intermittent right upper quadrant pain.  Vital signs stable.  Mild distress secondary to pain.  She has right upper quadrant tenderness to palpation.  No rebound, guarding, rigidity, open wounds, masses or rashes appreciated.  No CVA tenderness.  DDX includes but is not limited to cholelithiasis, choledocholithiasis, pancreatitis, GERD, PUD, and less likely gastroenteritis, colitis, hepatitis, abdominal muscular strain.  Will order labs and imaging, give IV fluids, Zofran, and Levsin, and reassess.    UPT negative.  UA with no signs of infection.  CBC with no leukocytosis or anemia.  CMP with no electrolyte abnormalities.  Creatinine of 0.8.  Lipase WNL at 23.    Ultrasound shows hepatomegaly and hepatic steatosis.  No intrahepatic biliary ductal dilation.  The common bile duct is within normal limits.  No gallstones, gallbladder wall thickening, pericholecystic fluid, or focal tenderness over the gallbladder.    Patient updated with results.  She reports little improvement with Levsin.  Will give GI cocktail.  Given patient's history, physical exam, and labs and imaging, her symptoms today are less likely related to her gallbladder. Mostly due to GERD.  She is to continue to take Nexium as directed or try Prilosec.  She is to closely follow up with gastroenterology for further management and  evaluation.  Follow-up with PCP as scheduled.  Strict ED return precaution given.  All questions answered.  She is comfortable with plan and stable for discharge.  I have reviewed patient's chart, labs, and imaging and discussed this case with my supervising BABAR Aldana Attestation:   Everibe #1: I performed the above scribed service and the documentation accurately describes the services I performed. I attest to the accuracy of the note.    Attending Attestation:     Physician Attestation Statement for NP/PA:   I discussed this assessment and plan of this patient with the NP/PA, but I did not personally examine the patient. The face to face encounter was performed by the NP/PA.    Other NP/PA Attestation Additions:    History of Present Illness: 46-year-old woman with intermittent RUQ pain for the last year. Pain is worse after eating certain foods, such as pork. She saw general surgery for this pain in May of last year with negative gallbladder ultrasound and HIDA scan. She is here today with acute exacerbation of this pain after eating pork.    Medical Decision Making: Labs today are unremarkable. Gallbladder ultrasound is negative. At the time of her previous negative work up, it was recommended that she follow up with GI. She has yet to do this. We are recommending the same and will start her on PPI.        Physician Attestation for Scribe:  Physician Attestation Statement for Everibe #1: I, Dr. Morton, reviewed documentation, as scribed by Tash Benitez in my presence, and it is both accurate and complete.                 ED Course     Clinical Impression:   The primary encounter diagnosis was RUQ pain. Diagnoses of Hepatomegaly and Hepatic steatosis were also pertinent to this visit.    Disposition:   Disposition: Discharged  Condition: Stable       Alcira Gallagher PA-C  01/23/17 1810

## 2024-07-17 ENCOUNTER — LAB VISIT (OUTPATIENT)
Dept: LAB | Facility: HOSPITAL | Age: 54
End: 2024-07-17
Attending: PHYSICIAN ASSISTANT
Payer: COMMERCIAL

## 2024-07-17 DIAGNOSIS — Z80.3 FAMILY HISTORY OF BREAST CANCER: ICD-10-CM

## 2024-07-17 DIAGNOSIS — Z82.79 FAMILY HISTORY OF NEUROFIBROMATOSIS: ICD-10-CM

## 2024-07-17 PROCEDURE — 36415 COLL VENOUS BLD VENIPUNCTURE: CPT | Mod: PN | Performed by: PHYSICIAN ASSISTANT

## 2024-07-26 LAB
GENETIC COUNSELING?: YES
GENSO SPECIMEN TYPE: NORMAL
MISCELLANEOUS GENETIC TEST NAME: NORMAL
PARTENTAL OR SIBLING TESTING?: NO
REFERENCE LAB: NORMAL
TEST RESULT: NORMAL

## 2024-08-13 ENCOUNTER — TELEPHONE (OUTPATIENT)
Dept: HEMATOLOGY/ONCOLOGY | Facility: CLINIC | Age: 54
End: 2024-08-13
Payer: COMMERCIAL

## 2024-08-13 PROBLEM — Z15.09 MONOALLELIC MUTATION OF MITF GENE: Status: ACTIVE | Noted: 2024-08-13

## 2024-08-13 PROBLEM — Z15.89 MONOALLELIC MUTATION OF MITF GENE: Status: ACTIVE | Noted: 2024-08-13

## 2024-08-14 ENCOUNTER — OFFICE VISIT (OUTPATIENT)
Dept: HEMATOLOGY/ONCOLOGY | Facility: CLINIC | Age: 54
End: 2024-08-14
Payer: COMMERCIAL

## 2024-08-14 ENCOUNTER — PATIENT MESSAGE (OUTPATIENT)
Dept: HEMATOLOGY/ONCOLOGY | Facility: CLINIC | Age: 54
End: 2024-08-14

## 2024-08-14 DIAGNOSIS — Z80.3 FAMILY HISTORY OF BREAST CANCER: ICD-10-CM

## 2024-08-14 DIAGNOSIS — Z15.09 MONOALLELIC MUTATION OF MITF GENE: ICD-10-CM

## 2024-08-14 DIAGNOSIS — Z82.79 FAMILY HISTORY OF NEUROFIBROMATOSIS: ICD-10-CM

## 2024-08-14 DIAGNOSIS — Z15.89 MONOALLELIC MUTATION OF MITF GENE: ICD-10-CM

## 2024-08-14 DIAGNOSIS — Z80.42 FAMILY HISTORY OF PROSTATE CANCER: ICD-10-CM

## 2024-08-14 DIAGNOSIS — Z12.9 CANCER SCREENING: ICD-10-CM

## 2024-08-14 DIAGNOSIS — Z85.820 HISTORY OF MALIGNANT MELANOMA: ICD-10-CM

## 2024-08-14 DIAGNOSIS — Z71.83 ENCOUNTER FOR NONPROCREATIVE GENETIC COUNSELING: Primary | ICD-10-CM

## 2024-08-14 PROCEDURE — 3044F HG A1C LEVEL LT 7.0%: CPT | Mod: CPTII,95,, | Performed by: PHYSICIAN ASSISTANT

## 2024-08-14 PROCEDURE — 99214 OFFICE O/P EST MOD 30 MIN: CPT | Mod: 95,,, | Performed by: PHYSICIAN ASSISTANT

## 2024-08-14 NOTE — PROGRESS NOTES
Hereditary/High Risk Clinic  Department of Hematology and Oncology  [facility]    Cancer Genetics  Results Disclosure & Post-Test Counseling    Date of Service:  24  Visit Provider:  Vicky Ramirez PA-C  Collaborating Physician:  Rosalia Mosley MD    Patient:  Isela Graf  :  1970  MRN:  [id number]     Referring Provider:   Kylah Le MD  [address] [place] [address]     Televisit Information:  Patient location: [place]  Visit type: Audiovisual  Face-to-face time with patient: approximately 15 minutes.  Approximately 30 minutes in total were spent on this encounter, which includes face-to-face time and non-face-to-face time preparing to see the patient (e.g., review of tests), obtaining and/or reviewing separately obtained history, documenting clinical information in the electronic or other health record, independently interpreting results (not separately reported) and communicating results to the patient/family/caregiver, or care coordination (not separately reported).  Each patient to whom he or she provides medical services by telemedicine is:  (1) informed of the relationship between the physician and patient and the respective role of any other health care provider with respect to management of the patient; and (2) notified that he or she may decline to receive medical services by telemedicine and may withdraw from such care at any time.    GENETIC TESTING RESULTS        A copy of the results report is available in Media.     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   Though some studies have suggested an increase in risk of renal cell carcinoma, current evidence is insufficient to support a clear increase in risk of renal cancer in carriers of p.E318K over that of the general population (Adrian REDMAN et al; Jeff S et al. Sci Rep. 2020  "Oct 13;10(8):56671).  Screening guidelines: Annual total-body skin exam by a dermatologist.   Genetic mutations are passed directly from parent to child with each child having a 50% chance of inheriting the mutation. Therefore, relatives should consider testing for the MITF mutation.     ASSESSMENT   Isela Graf, 54 y.o., assigned female sex at birth, has a personal/family history of:   Isela: Malignant melanoma, scalp (2015); BCC, right cheek; SCCis (2022); severe dysplasia/early melanoma in situ, back (2024)  Mother: Recently dx with stage IV "rare tissue cancer." Sister thinks it might be ovarian. Refused genetic testing.   MGM: Neurofibromatosis,  from unspecified cancer in her 50s.   Maternal uncle:  at 14 from bone cancer.   Maternal uncle: Neurofibromatosis,  from unspecified cancer in his 70s  Maternal aunt: Neurofibromatosis, living  Father: No cancers,   PGM: Breast cancer 40s, skin cancer NOS,  at 90  PGF: Prostate cancer 50s,  in his 80s    Isela underwent comprehensive germline genetic testing that included the genes associated with hereditary melanoma, neurofibromatosis, ovarian cancer, breast cancer, prostate cancer, and other cancers. Testing revealed an MITF mutation, which is associated with cutaneous melanoma. All other genes were normal.     In regards to her family history, this is considered an uninformative negative, as it is unknown if her relatives with cancer and neurofibromatosis have a mutation she did not inherit.   Genetic mutations do not skip generations. If her relatives have a mutation she does not have, her children cannot have it.   Her aunt with neurofibromatosis should have genetic testing.   Her sister Katarina had comprehensive genetic testing that was negative for mutations in MITF and all other genes. Isela's other siblings should undergo genetic testing.     PLAN     1. Encounter for nonprocreative genetic counseling    2. " Monoallelic mutation of MITF gene  - Follow up with Dr. Le for total-body skin exam at least annually.   - MITF testing for at-risk relatives.     3. History of malignant melanoma    4. Family history of neurofibromatosis    5. Family history of breast cancer    6. Family history of prostate cancer    7. Cancer screening  - Annual well woman exam for breast and gynecologic cancer screening.   - Colonoscopy every 10 years. Next is due 6/2030.   - Cancer risk-reduction strategies.      Questions were encouraged and answered to the patient's satisfaction, and she verbalized understanding of information and agreement with the plan.       SUBJECTIVE   Chief Complaint: Post-test genetic counseling  History of Present Illness (HPI):  Isela Graf was previously seen by me for a genetic evaluation and pre-test genetic counseling. She underwent genetic testing and is seen today to review the results and recommendations.     Medical, surgical, family history: Reviewed. No pertinent changes from previous visit.     Review of Systems  See HPI.      OBJECTIVE   Physical Exam  Limited secondary to the inherent nature of a virtual visit.  Very pleasant patient.  Constitutional: No apparent distress.   Neurological: Alert and oriented. No obvious neurological deficits.   Psychiatric: Normal mood, affect, thought content, speech, behavior, judgment.    REFERENCES   TIMMY Aguilar., ARNOLDO Carroll, BEATRIZ Messer. et al. Cancer risks associated with the germline MITF(E318K) variant. Sci Rep 10, 06552 (2020). https://doi.org/10.1038/k45511-487-04883-g. Retrieved on 12/6/2022 from https://www.nature.com/articles/h38949-079-27354-l#citeas  American Cancer Society. (2023). Key statistics for melanoma skin cancer. Last revised January 12, 2023.   National Comprehensive Cancer Network (NCCN). (2024). Melanoma: Cutaneous. NCCN Clinical Practice Guidelines in Oncology (NCCN Guidelines), Version 2.2024.      ELVIN Negron,  JAYLA  Physician Assistant, Hereditary/High Risk Clinic  Hematology/Oncology, [facility]

## 2024-08-14 NOTE — LETTER
Germline MITF E318K Mutations   Hereditary Malignant Melanoma    MITF c.952G>A (p.Ebf448Dlt), heterozygous, pathogenic (also known as E318K)    Malignant melanoma  The MITF variant E318K results in a lifetime risk for malignant melanoma that is 2 times that of the general population.      General Population MITF Carriers   Whites 2.6% 5%   Hispanics 0.6% 1-2%   Blacks 0.1% 0.2-0.4%     Screening: Total-body skin exam by a dermatologist at least annually.     Other risks: Some studies suggest that the E318K variant may be associated with breast cancer, pancreatic cancer and renal cell carcinoma. However, a systematic review and meta-analysis of the published data published in 2020 found minimal evidence that p.E318K contributes to non-melanoma cancers. Any association between this variant and other cancers may be related to shared environmental or polygenic risk factors rather than this variant.     Implications for relatives: Genetic mutations are passed directly from parent to child with each child having a 50% chance of inheriting the mutation. Therefore, relatives should consider testing for the MITF mutation.

## 2024-08-14 NOTE — LETTER
Family Notification Letter    Isela Graf underwent genetic testing that revealed an inherited mutation in a gene called MITF that results in an increased risk for melanoma of the skin. People with this mutation need to see a dermatologist at least annually for a total-body skin exam. Inherited mutations are passed directly from parent to child, with each child having a 50% chance of inheriting the mutation. Therefore, this mutation could be present in Isela's parent, siblings, children and other more distant relatives. Genetic testing is recommended for relatives age 18 and older. Cascade testing is advised, which means the parent is tested first. Mutations do not skip generations, so if a parent tests negative, their children cannot have the mutation and do not need testing for it.     How to get testing:   You will need to see a genetics provider who will provide pre-test counseling and order the testing. They will review your personal and family family and discuss the costs, risks and benefits of genetic testing. If you opt to proceed with testing, the provider will make sure you are tested for all of the genes that need to be tested based on your personal and family history. After testing is complete, the provider will review the results with you and provide you with a personalized cancer risk assessment, recommendations, and any referrals that are needed. If the genetics provider determines that you only need testing for the MITF mutation, [facility] will test your MITF gene for free if your specimen is received by October 24, 2024. If your personal or family history warrants testing of other genes, your insurance will be billed for those other genes.    For individuals located in [place]:   [facility] has in-person genetics appointments in [facility], and [place]. Virtual appointments are available to anyone located in [place] at the time of the visit who has access to the  "[facility] portal on their computer, tablet, or smart phone.  Call [facility] at [phone] to schedule an appointment. Tell the  you qualify for an "urgent" appointment because your relative has a genetic mutation. You can also ask Isela to send me a [facility] message with your name,  and phone number and we will contact you to schedule.     For individuals located outside of [place]:   Visit [email or web address] (National Society of Genetic Counselors) and click Find a Genetic Counselor to find a provider in your area.     Please give this letter and a copy of Isela's results report to your genetics provider.   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

## 2024-09-24 ENCOUNTER — TELEPHONE (OUTPATIENT)
Dept: PULMONOLOGY | Facility: CLINIC | Age: 54
End: 2024-09-24
Payer: COMMERCIAL

## 2024-09-24 NOTE — TELEPHONE ENCOUNTER
----- Message from Dalia Wilkerson sent at 9/24/2024  4:40 PM CDT -----  Regarding: Refill  Contact: Christopher [phone]  Christopher/ Rocael Brandt is calling get a refill on rx: azelastine (ASTELIN) 137 mcg (0.1 %) nasal spray. States pt is asking for a 90 day and 3 refills please call      [facility] - [address] [address]  Phone: [phone] Fax: [phone]

## 2024-10-10 ENCOUNTER — TELEPHONE (OUTPATIENT)
Dept: FAMILY MEDICINE | Facility: CLINIC | Age: 54
End: 2024-10-10

## 2024-10-10 NOTE — TELEPHONE ENCOUNTER
----- Message from Liyah sent at 10/10/2024 11:32 AM CDT -----  Type: Flu Shot Apt     Who Called: Pt   Does the patient know what this is regarding?: pt needs to reschedule flu shot for next week, she is sick with sinuses today   Would the patient rather a call back or a response via [facility]? Call   Best Call Back Number: [phone]  Additional Information:

## 2024-10-10 NOTE — TELEPHONE ENCOUNTER
Spoke with pt. She cancelled nurse appt today due to not feeling well. She will call back to reschedule to next week.

## 2024-10-15 ENCOUNTER — HOSPITAL ENCOUNTER (OUTPATIENT)
Dept: RADIOLOGY | Facility: HOSPITAL | Age: 54
Discharge: HOME | End: 2024-10-15
Attending: NURSE PRACTITIONER
Payer: COMMERCIAL

## 2024-10-15 DIAGNOSIS — K74.00 LIVER FIBROSIS: ICD-10-CM

## 2024-10-15 DIAGNOSIS — K76.0 FATTY LIVER: ICD-10-CM

## 2024-10-15 PROCEDURE — 76391 MR ELASTOGRAPHY: CPT | Mod: TC

## 2024-10-15 PROCEDURE — 76391 MR ELASTOGRAPHY: CPT | Mod: 26,,, | Performed by: RADIOLOGY

## 2024-10-23 ENCOUNTER — OFFICE VISIT (OUTPATIENT)
Dept: HEPATOLOGY | Facility: CLINIC | Age: 54
End: 2024-10-23
Payer: COMMERCIAL

## 2024-10-23 ENCOUNTER — TELEPHONE (OUTPATIENT)
Dept: HEPATOLOGY | Facility: CLINIC | Age: 54
End: 2024-10-23

## 2024-10-23 VITALS — BODY MASS INDEX: 33.04 KG/M2 | WEIGHT: 175 LBS | HEIGHT: 61 IN

## 2024-10-23 DIAGNOSIS — E66.811 CLASS 1 OBESITY DUE TO EXCESS CALORIES WITH SERIOUS COMORBIDITY AND BODY MASS INDEX (BMI) OF 33.0 TO 33.9 IN ADULT: ICD-10-CM

## 2024-10-23 DIAGNOSIS — E78.5 HYPERLIPIDEMIA, UNSPECIFIED HYPERLIPIDEMIA TYPE: ICD-10-CM

## 2024-10-23 DIAGNOSIS — K76.0 FATTY LIVER: Primary | ICD-10-CM

## 2024-10-23 DIAGNOSIS — E88.01 ALPHA-1-ANTITRYPSIN DEFICIENCY: ICD-10-CM

## 2024-10-23 DIAGNOSIS — E66.09 CLASS 1 OBESITY DUE TO EXCESS CALORIES WITH SERIOUS COMORBIDITY AND BODY MASS INDEX (BMI) OF 33.0 TO 33.9 IN ADULT: ICD-10-CM

## 2024-10-23 PROBLEM — R73.03 PRE-DIABETES: Status: RESOLVED | Noted: 2022-07-25 | Resolved: 2024-10-23

## 2024-10-23 PROBLEM — E66.01 SEVERE OBESITY (BMI 35.0-39.9) WITH COMORBIDITY: Status: RESOLVED | Noted: 2024-02-26 | Resolved: 2024-10-23

## 2024-10-23 PROBLEM — K74.00 LIVER FIBROSIS: Status: RESOLVED | Noted: 2022-08-10 | Resolved: 2024-10-23

## 2024-10-23 PROCEDURE — 1159F MED LIST DOCD IN RCRD: CPT | Mod: CPTII,95,, | Performed by: NURSE PRACTITIONER

## 2024-10-23 PROCEDURE — 3008F BODY MASS INDEX DOCD: CPT | Mod: CPTII,95,, | Performed by: NURSE PRACTITIONER

## 2024-10-23 PROCEDURE — 1160F RVW MEDS BY RX/DR IN RCRD: CPT | Mod: CPTII,95,, | Performed by: NURSE PRACTITIONER

## 2024-10-23 PROCEDURE — 99214 OFFICE O/P EST MOD 30 MIN: CPT | Mod: 95,,, | Performed by: NURSE PRACTITIONER

## 2024-10-23 PROCEDURE — 3044F HG A1C LEVEL LT 7.0%: CPT | Mod: CPTII,95,, | Performed by: NURSE PRACTITIONER

## 2024-10-23 NOTE — TELEPHONE ENCOUNTER
Appts scheduled   ----- Message from Gabriela Green NP sent at 10/23/2024  1:41 PM CDT -----  Please contact pt to schedule follow up video visit with lab and MRI elasto 1 week ago. Thanks !

## 2024-10-23 NOTE — PROGRESS NOTES
The patient location is: [place]  The chief complaint leading to consultation is: see below     Visit type: audiovisual    Face to Face time with patient: 30 minutes of total time spent on the encounter, which includes face to face time and non-face to face time preparing to see the patient (eg, review of tests), Obtaining and/or reviewing separately obtained history, Documenting clinical information in the electronic or other health record, Independently interpreting results (not separately reported) and communicating results to the patient/family/caregiver, or Care coordination (not separately reported).         Each patient to whom he or she provides medical services by telemedicine is:  (1) informed of the relationship between the physician and patient and the respective role of any other health care provider with respect to management of the patient; and (2) notified that he or she may decline to receive medical services by telemedicine and may withdraw from such care at any time.    Notes:      [facility] VISIT FOLLOW UP NOTE    PCP: Carlos Santiago MD     CHIEF COMPLAINT: fatty liver, alpha 1 antitrypsin deficiency, h/o liver fibrosis     HPI: This is a 54 y.o. White female with PMH noted below, presenting for follow up of above     Serological workup was negative for Geovanni's, hemochromatosis, autoimmune etiology, and viral hepatitis A, B and C.   alpha-1 antitrypsin deficiency - SS, level 90, saw pulm  PETH 54  10/2024    Prior serologic workup:   Lab Results   Component Value Date    SMOOTHMUSCAB Negative 1:40 07/25/2022    AMAIFA Negative 1:40 07/25/2022    IGGSERUM 1166 07/25/2022    ANASCREEN Negative <1:80 07/25/2022    FERRITIN 247 07/25/2022    FESATURATED 11 (L) 07/25/2022    UYAFF7VYQFLA SEE BELOW 07/27/2022    SWYEE4AVUJXI 90 (L) 07/27/2022    CERULOPLSM 36.0 07/25/2022    HEPBSAG Negative 07/25/2022    HEPCAB Negative 07/25/2022    HEPAIGM Negative 06/07/2018     Risk factors for fatty  liver include previously regular alcohol use, obesity, HLD, preDM    Liver fibrosis staging:  -- fibroscan 8/2022 noted F3, S3 (kPA 12.7, )  -- liver biopsy 4/2023 noted fatty liver, F2 fibrosis   -- MRI elasto 10/2024 noted moderate/severe fatty liver (22.8%), no scar tissue (kPA 2.2)    Interval HPI: Presents today alone via video visit.  Stopped alcohol completely ~2/2023, previously drinking heavily (~1 bottle of wine nightly) but resumed 1/2 bottle of wine a few times per week since last visit, plans to decrease   MRI with significant steatosis but no fibrosis   Doing well  with lifestyle/diet: following intermittent fasting and trying to follow lower carb, smaller portions  Walking a few times per week   No SSB  Current wt 175s    Labs done 10/2024 show normal transaminase levels  Platelets WNL, alk phos WNL  Synthetic liver functioning WNL    Lab Results   Component Value Date    ALT 35 10/15/2024    AST 32 10/15/2024    ALKPHOS 107 10/15/2024    BILITOT 0.3 10/15/2024    ALBUMIN 4.4 10/15/2024    INR 1.0 03/15/2023     02/27/2024     Abd MRI done 10/2024 showed hepatomegaly, liver cyst, fatty liver, no splenomegaly    Denies family history of liver disease . + weekly Alcohol consumption, see below  Social History     Substance and Sexual Activity   Alcohol Use Not Currently    Comment: ~1 bottle of wine nightly x years, stopped completed 2/2023, but resumed 1/2 bottle of wine a few times per week     + Immunity to Hep A and B           Allergy and medication list reviewed and updated     PMHX:  has a past medical history of Alpha-1-antitrypsin deficiency (07/25/2022), Anxiety, Constipation, Depression, Hyperlipidemia, Liver fibrosis (08/10/2022), Melanoma of scalp (01/12/2015), Open wound of scalp, complicated (02/06/2015), Pre-diabetes (07/25/2022), Seasonal allergies, Thickened endometrium (06/26/2020), and Uterine fibroid (06/2020).    PSHX:  has a past surgical history that includes  "Appendectomy; Tonsillectomy; Eye surgery; WLE scalp melanoma (01/26/2015); Gallbladder surgery (02/2017); Scalp Reconstruction; Esophagogastroduodenoscopy (N/A, 06/02/2020); Colonoscopy (N/A, 06/02/2020); Cholecystectomy; Hysteroscopy with dilation and curettage of uterus (N/A, 04/04/2023); Liver biopsy (N/A, 04/04/2023); and Needle localization (N/A, 04/04/2023).    FAMILY HISTORY: Updated and reviewed in [facility]    SOCIAL HISTORY:   Social History     Substance and Sexual Activity   Alcohol Use Not Currently    Comment: ~1 bottle of wine nightly x years, stopped completed 2/2023, but resumed some rare use since       Social History     Substance and Sexual Activity   Drug Use Yes    Types: Marijuana    Comment: medical thc gummy -daily       ROS:   GENERAL: Denies fatigue  CARDIOVASCULAR: Denies edema  GI: Denies abdominal pain  SKIN: Denies rash, itching   NEURO: Denies confusion, memory loss, or mood changes    PHYSICAL EXAM:   Friendly White female, in no acute distress; alert and oriented to person, place and time  VITALS: Ht 5' 1" (1.549 m)   Wt 79.4 kg (175 lb)   LMP 10/12/2018 (Approximate)   BMI 33.07 kg/m²   EYES: Sclerae anicteric  GI: Soft, non-tender, non-distended. No ascites.  EXTREMITIES:  No edema.  SKIN: Warm and dry. No jaundice. No telangectasias noted. No palmar erythema.  NEURO:  No asterixis.  PSYCH:  Thought and speech pattern appropriate. Behavior normal      EDUCATION:  See instructions discussed with patient in Instructions section of the After Visit Summary     ASSESSMENT & PLAN:  54 y.o. White female with:  1.   Fatty liver, likely related to metabolic risk factors +/- previous alcohol use   - see HPI  -- Immunity to Hep A and B : see HPI  -- Recommendations discussed with patient:  Limit alcohol consumption, decrease to once monthly to prevent fibrosis again   2 Continue weight loss   3. Low carb/sugar, high fiber and protein diet, limit your carb intake to LESS than 30-45 grams of " carbs with a meal or LESS than 5-10 grams with any snack   4. Exercise, 5 days per week, 30 minutes per day, as tolerated  5. Recommend good cholesterol, blood pressure, blood sugar levels   6. No indication for Rezdiffra, no fibrosis     2. Alpha 1 antitrypsin deficiency   -- saw pulm, will follow yearly for fibrosis from a liver perspective    3. Obesity, HLD  -- Body mass index is 33.07 kg/m².   -- increases risk for fatty liver            Follow up in about 1 year (around 10/23/2025). Video visit With MRI and labs a few days before    Orders Placed This Encounter   Procedures    MR Elastography    Hepatic Function Panel    Phosphatidylethanol (PETH)        Thank you for allowing me to participate in the care of Isela Grant BABAR Thompson    I spent a total of 30 minutes on the day of the visit.This includes face to face time and non-face to face time preparing to see the patient (eg, review of tests), obtaining and/or reviewing separately obtained history, documenting clinical information in the electronic or other health record, independently interpreting results and communicating results to the patient/family/caregiver, and coordinating care.         CC'ed note to:

## 2024-10-25 ENCOUNTER — TELEPHONE (OUTPATIENT)
Dept: DERMATOLOGY | Facility: CLINIC | Age: 54
End: 2024-10-25
Payer: COMMERCIAL

## 2024-10-25 NOTE — TELEPHONE ENCOUNTER
Called and spoke to pt. Pt is an hour behind due to traffic. Pt rescheduled for early November since she is stuck in traffic today on the way to her scheduled appt. Pt confirmed new appt date and time. They thanked me very much for the help.     ----- Message from Dalia sent at 10/25/2024  1:36 PM CDT -----  Regarding: Appt  Contact: Pt  [phone]  Pt is calling to state she is stuck in traffic and will be late pt want to know if she will be seen when she gets there please call

## 2024-11-04 ENCOUNTER — OFFICE VISIT (OUTPATIENT)
Dept: DERMATOLOGY | Facility: CLINIC | Age: 54
End: 2024-11-04
Payer: COMMERCIAL

## 2024-11-04 DIAGNOSIS — Z85.820 HISTORY OF MALIGNANT MELANOMA: ICD-10-CM

## 2024-11-04 DIAGNOSIS — L73.8 SEBACEOUS HYPERPLASIA OF FACE: Primary | ICD-10-CM

## 2024-11-04 DIAGNOSIS — L81.4 LENTIGINES: ICD-10-CM

## 2024-11-04 DIAGNOSIS — D23.9 DERMATOFIBROMA: ICD-10-CM

## 2024-11-04 DIAGNOSIS — D22.9 MULTIPLE BENIGN NEVI: ICD-10-CM

## 2024-11-04 DIAGNOSIS — Z85.828 HISTORY OF NONMELANOMA SKIN CANCER: ICD-10-CM

## 2024-11-04 PROCEDURE — 99999 PR PBB SHADOW E&M-EST. PATIENT-LVL III: CPT | Mod: PBBFAC,,, | Performed by: STUDENT IN AN ORGANIZED HEALTH CARE EDUCATION/TRAINING PROGRAM

## 2024-11-04 PROCEDURE — G2211 COMPLEX E/M VISIT ADD ON: HCPCS | Mod: S$GLB,,, | Performed by: STUDENT IN AN ORGANIZED HEALTH CARE EDUCATION/TRAINING PROGRAM

## 2024-11-04 PROCEDURE — 3044F HG A1C LEVEL LT 7.0%: CPT | Mod: CPTII,S$GLB,, | Performed by: STUDENT IN AN ORGANIZED HEALTH CARE EDUCATION/TRAINING PROGRAM

## 2024-11-04 PROCEDURE — 99213 OFFICE O/P EST LOW 20 MIN: CPT | Mod: S$GLB,,, | Performed by: STUDENT IN AN ORGANIZED HEALTH CARE EDUCATION/TRAINING PROGRAM

## 2024-11-04 PROCEDURE — 1159F MED LIST DOCD IN RCRD: CPT | Mod: CPTII,S$GLB,, | Performed by: STUDENT IN AN ORGANIZED HEALTH CARE EDUCATION/TRAINING PROGRAM

## 2024-11-04 PROCEDURE — 1160F RVW MEDS BY RX/DR IN RCRD: CPT | Mod: CPTII,S$GLB,, | Performed by: STUDENT IN AN ORGANIZED HEALTH CARE EDUCATION/TRAINING PROGRAM

## 2024-11-04 NOTE — PROGRESS NOTES
"  Subjective:      Patient ID:  Isela Graf is a 54 y.o. female who presents for   Chief Complaint   Patient presents with    Skin Check     TBSE     Isela Graf is a 54 y.o. female who presents for: FBSE screening exam for skin cancer.    Last office visit 6/21/24 for FBSE    Patient with new area of concern:   Location: Lower R leg  Duration: 3-4 months  S/s: Bug bite itchy turned numb and scarred  Previous treatments: Benadryll, no effect    Pertinent history:  Personal history of mole removal: Yes  Fam hx melanoma: In grandparent, no hx pancreatic ca in family  Personal history of skin cancer: Yes - MM stage 1b excised from frontal scalp in 1/2015, BCC R cheek excised years ago, SCCis 7/26/22 s/p Efudex  Severely dysplastic nevus "concerning for early melanoma in situ" excised by me mid upper back 1/2024     Melanoma hx:  Pt initially had a biopsy of the scalp lesion by Dr. Wolf which revealed invasive melanoma, 0.42mm thick (with regression to 1.51 mm), but no mitoses or ulceration.   The excision specimen showed residual invasive melanoma extending to a max depth of 0.67 mm with negative margins.  SLN bx was attempted, but there was unsuccessful localization of the sentinel node.      Review of Systems   Constitutional:  Positive for night sweats. Negative for fever, chills, weight loss, fatigue and malaise.   HENT:  Negative for headaches.    Respiratory:  Positive for cough (mornings). Negative for shortness of breath.    Gastrointestinal:  Positive for indigestion.   Skin:  Positive for daily sunscreen use (most mornings) and activity-related sunscreen use. Negative for recent sunburn and wears hat.   Neurological:  Negative for headaches.   Hematologic/Lymphatic: Negative for adenopathy. Does not bruise/bleed easily.       Objective:   Physical Exam   Constitutional: She appears well-developed and well-nourished. No distress.   Neurological: She is alert and oriented to person, " place, and time. She is not disoriented.   Psychiatric: She has a normal mood and affect.   Skin:   Areas Examined (abnormalities noted in diagram):   Scalp / Hair Palpated and Inspected  Head / Face Inspection Performed  Neck Inspection Performed  Chest / Axilla Inspection Performed  Abdomen Inspection Performed  Genitals / Buttocks / Groin Inspection Performed  Back Inspection Performed  RUE Inspected  LUE Inspection Performed  RLE Inspected  LLE Inspection Performed  Nails and Digits Inspection Performed                 Diagram Legend     Erythematous scaling macule/papule c/w actinic keratosis       Vascular papule c/w angioma      Pigmented verrucoid papule/plaque c/w seborrheic keratosis      Yellow umbilicated papule c/w sebaceous hyperplasia      Irregularly shaped tan macule c/w lentigo     1-2 mm smooth white papules consistent with Milia      Movable subcutaneous cyst with punctum c/w epidermal inclusion cyst      Subcutaneous movable cyst c/w pilar cyst      Firm pink to brown papule c/w dermatofibroma      Pedunculated fleshy papule(s) c/w skin tag(s)      Evenly pigmented macule c/w junctional nevus     Mildly variegated pigmented, slightly irregular-bordered macule c/w mildly atypical nevus      Flesh colored to evenly pigmented papule c/w intradermal nevus       Pink pearly papule/plaque c/w basal cell carcinoma      Erythematous hyperkeratotic cursted plaque c/w SCC      Surgical scar with no sign of skin cancer recurrence      Open and closed comedones      Inflammatory papules and pustules      Verrucoid papule consistent consistent with wart     Erythematous eczematous patches and plaques     Dystrophic onycholytic nail with subungual debris c/w onychomycosis     Umbilicated papule    Erythematous-base heme-crusted tan verrucoid plaque consistent with inflamed seborrheic keratosis     Erythematous Silvery Scaling Plaque c/w Psoriasis     See annotation      Assessment / Plan:      Sebaceous  hyperplasia of face  This is a common condition representing benign enlargement of the sebaceous lobule. It typically occurs in adulthood. Reassurance given to patient.     Multiple benign nevi  Examined w dermoscopy  Reassurance    Dermatofibroma  This is a benign scar-like lesion secondary to minor trauma. No treatment required.   Present prior visits. Pt notes present x years.    Lentigines  This is a benign hyperpigmented sun induced lesion. Recommend daily sun protection/avoidance and use of at least SPF 30, broad spectrum sunscreen (OTC drug) will reduce the number of new lesions. Treatment of these benign lesions are considered cosmetic.    History of malignant melanoma  History NMSC  No fevers, chills, or unintended weight loss; lymph nodes examined  Well-healed scar located scalp, back no evidence of recurrence  - Continue skin exams q6 mo  - Discussed ABCDE's of nevi.  Monitor for new mole or moles that are becoming bigger, darker, irritated, or developing irregular borders. Instructed patient to observe lesion(s) for changes and follow up in clinic if changes are noted. Patient to monitor skin at home for new or changing lesions.     RTC 6 mo, sooner prn

## 2024-12-03 ENCOUNTER — TELEPHONE (OUTPATIENT)
Dept: FAMILY MEDICINE | Facility: CLINIC | Age: 54
End: 2024-12-03
Payer: COMMERCIAL

## 2024-12-03 NOTE — TELEPHONE ENCOUNTER
----- Message from Latesha sent at 12/3/2024  2:23 PM CST -----  .Type:  Needs Medical Advice    Who Called: pt    Would the patient rather a call back or a response via [facility]? Call back  Best Call Back Number: [phone]  Additional Information:     Pt stated she is having a burning feeling and not sure if its a UTI or blood in her urine and would like an order for a urine sample

## 2024-12-04 ENCOUNTER — NURSE TRIAGE (OUTPATIENT)
Dept: ADMINISTRATIVE | Facility: CLINIC | Age: 54
End: 2024-12-04
Payer: COMMERCIAL

## 2024-12-04 ENCOUNTER — OCHSNER VIRTUAL EMERGENCY DEPARTMENT (OUTPATIENT)
Facility: CLINIC | Age: 54
End: 2024-12-04
Payer: COMMERCIAL

## 2024-12-04 DIAGNOSIS — R30.0 DYSURIA: Primary | ICD-10-CM

## 2024-12-04 RX ORDER — ROSUVASTATIN CALCIUM 20 MG/1
20 TABLET, COATED ORAL NIGHTLY
Qty: 90 TABLET | Refills: 3 | Status: SHIPPED | OUTPATIENT
Start: 2024-12-04

## 2024-12-04 RX ORDER — CITALOPRAM 10 MG/1
10 TABLET ORAL NIGHTLY
Qty: 90 TABLET | Refills: 3 | Status: SHIPPED | OUTPATIENT
Start: 2024-12-04

## 2024-12-04 RX ORDER — HYDROGEN PEROXIDE 3 %
20 SOLUTION, NON-ORAL MISCELLANEOUS
Qty: 90 CAPSULE | Refills: 3 | Status: SHIPPED | OUTPATIENT
Start: 2024-12-04

## 2024-12-04 NOTE — TELEPHONE ENCOUNTER
Care Due:                  Date            Visit Type   Department     Provider  --------------------------------------------------------------------------------                                EP -                              PRIMARY      [facility]  Last Visit: 02-      CARE ([facility])   DELGADO Santiago                              EP -                              PRIMARY      [facility]  Next Visit: 02-      CARE ([facility])   DELGADO Santiago                                                            Last  Test          Frequency    Reason                     Performed    Due Date  --------------------------------------------------------------------------------    Lipid Panel.  12 months..  rosuvastatin.............  02- 02-    [facility] Embedded Care Due Messages. Reference number: [id number].   12/04/2024 11:58:53 AM CST

## 2024-12-04 NOTE — TELEPHONE ENCOUNTER
CC:  Chief Complaint   Patient presents with    Cough    Nasal Congestion    Chest Pain     with cough    Sore Throat       HPI: Wyatt Ibarra is a 8  y.o. 9  m.o. here for evaluation of tight chesty and barky cough for the last 3-4 days, cough getting progressively worse. he has had associated symptoms of nasal congestion.  He has had no fever. Mom has given saline spray and nasal strips medication with no response. The cough is now more frequent and harsh      Past Medical History:   Diagnosis Date    Anxiety state NEC     Respiratory system disease     + RAD w/ Viral/URI illnesses -- infrequently    Vomiting     + h/o recurring episodes of Vomiting -- possibly related to Anxiety.       No current outpatient prescriptions on file.    Review of Systems  Review of Systems   Constitutional: Positive for malaise/fatigue. Negative for fever.   HENT: Positive for congestion and sore throat. Negative for ear pain.    Respiratory: Positive for cough, sputum production, shortness of breath and stridor.    Gastrointestinal: Negative for abdominal pain, constipation, diarrhea, nausea and vomiting.   Neurological: Negative for headaches.   Endo/Heme/Allergies: Positive for environmental allergies.         PE:   Vitals:    08/08/18 0900   BP: (!) 125/71   Pulse: 86   Resp: 20   Temp: 98.7 °F (37.1 °C)       APPEARANCE: Alert, nontoxic, Well nourished, well developed, in no acute distress.    SKIN: Normal skin turgor, no rash noted  EARS: Ears - right TM red, dull, bulging, left TM red, dull, bulging. Serous effusions present bilat.  NOSE: Nasal exam - mucosal congestion, mucosal erythema and purulent rhinorrhea.  MOUTH & THROAT: Post nasal drip noted in posterior pharynx. Moist mucous membranes. No tonsillar enlargement. No pharyngeal erythema or exudate. No stridor.   NECK: Supple  CHEST: Lungs clear to auscultation with tidal respirations, but barky harsh nearly stridorous cough noted.  Respirations unlabored., no  Pt states I think I have uti co burning with urination and pressure. Per protocol referred to [facility] provider who recommends pt go to  for UA and possible ABX. Pt verbalizes understanding of instructions  Reason for Disposition   Side (flank) or lower back pain present    Additional Information   Negative: Shock suspected (e.g., cold/pale/clammy skin, too weak to stand, low BP, rapid pulse)   Negative: Sounds like a life-threatening emergency to the triager   Negative: Unable to urinate (or only a few drops) > 4 hours and bladder feels very full (e.g., palpable bladder or strong urge to urinate)   Negative: Decreased urination and drinking very little and dehydration suspected (e.g., dark urine, no urine > 12 hours, very dry mouth, very lightheaded)   Negative: Patient sounds very sick or weak to the triager   Negative: Fever > 100.4 F  (38.0 C)    Protocols used: Urinary Symptoms-A-OH     retractions. No rales or increased work of breathing.  CARDIOVASCULAR: Regular rate and rhythm without murmur. .  ABDOMEN: Not distended. Soft. No tenderness or masses.No hepatomegaly or splenomegaly    ASSESSMENT:  1.    1. Acute bronchitis with bronchospasm  azithromycin 200 mg/5 ml (ZITHROMAX) 200 mg/5 mL suspension    predniSONE (DELTASONE) 10 MG tablet    albuterol 90 mcg/actuation inhaler    dexamethasone sodium phos (PF) injection 3 mg   2. Croup syndrome  predniSONE (DELTASONE) 10 MG tablet    dexamethasone sodium phos (PF) injection 3 mg   3. Bilateral acute serous otitis media, recurrence not specified  azithromycin 200 mg/5 ml (ZITHROMAX) 200 mg/5 mL suspension    predniSONE (DELTASONE) 10 MG tablet       PLAN:  Wyatt WILLETT was seen today for cough, nasal congestion, chest pain and sore throat.    Diagnoses and all orders for this visit:    Acute bronchitis with bronchospasm  -     azithromycin 200 mg/5 ml (ZITHROMAX) 200 mg/5 mL suspension; 2 tsp po today, then 1 tsp po qd x 4  -     predniSONE (DELTASONE) 10 MG tablet; Take 1 tablet (10 mg total) by mouth 2 (two) times daily. For 5 days for 5 days  -     albuterol 90 mcg/actuation inhaler; Inhale 2 puffs into the lungs every 4 (four) hours as needed for Wheezing or Shortness of Breath (coarse cough).  -     dexamethasone sodium phos (PF) injection 3 mg; Inject 0.3 mLs (3 mg total) into the muscle one time.    Croup syndrome  -     predniSONE (DELTASONE) 10 MG tablet; Take 1 tablet (10 mg total) by mouth 2 (two) times daily. For 5 days for 5 days  -     dexamethasone sodium phos (PF) injection 3 mg; Inject 0.3 mLs (3 mg total) into the muscle one time.    Bilateral acute serous otitis media, recurrence not specified  -     azithromycin 200 mg/5 ml (ZITHROMAX) 200 mg/5 mL suspension; 2 tsp po today, then 1 tsp po qd x 4  -     predniSONE (DELTASONE) 10 MG tablet; Take 1 tablet (10 mg total) by mouth 2 (two) times daily. For 5 days for 5 days    albuterol  inhaler and spacer education done with mom and pt  SPACER INSTRUCTIONS:    EMPTY LUNGS, BLOW IT ALL OUT  PUT MOUTH ON THE SPACER WITH INHALER ATTACHED  1 PUFF, SUCK IN,  DEEP INHALE, AND HOLD X 10 SECONDS  REPEAT IN 1 MINUTE.      As always, drinking clear fluids helps hydrate and keep secretions thin.  Allergy meds through Fall, Flu shot in October  Explained usual course for this illness, including how long cough may last.    If Wyatt Ibarra isnt better after 5 days, call with update or schedule appointment.

## 2024-12-04 NOTE — PLAN OF CARE-OVED
[facility] Plan of Care Note    Referral source: Nurse On-Call      Reason for consult: UTI      Additional History:  54-year-old female with history of alpha 1 antitrypsin deficiency, fatty liver called nurse triage due to UTI symptoms.  She endorses low back pain but no flank pain, fevers, or vomiting.      Disposition recommended: Urgent Care      Additional Recommendations:  Patient advised to go to urgent care for urine analysis and possible antibiotics, which she was comfortable with.

## 2024-12-05 ENCOUNTER — TELEPHONE (OUTPATIENT)
Facility: CLINIC | Age: 54
End: 2024-12-05
Payer: COMMERCIAL

## 2024-12-05 NOTE — TELEPHONE ENCOUNTER
"Patient spoke with [facility] RN on 12/4/2024 with complaint of: "Pt states I think I have uti co burning with urination and pressure."    [facility] RN consulted with [facility] provider, Dr. Gan, and disposition recommended was:Patient advised to go to urgent care for urine analysis and possible antibiotics, which she was comfortable with.    [facility] virtual care follow up call made.  Patient outreached but unable to reach.  Navigator left patient a voicemail for a call return.    "

## 2025-02-06 ENCOUNTER — TELEPHONE (OUTPATIENT)
Dept: OBSTETRICS AND GYNECOLOGY | Facility: CLINIC | Age: 55
End: 2025-02-06
Payer: COMMERCIAL

## 2025-02-06 DIAGNOSIS — Z12.31 ENCOUNTER FOR SCREENING MAMMOGRAM FOR BREAST CANCER: Primary | ICD-10-CM

## 2025-02-06 NOTE — TELEPHONE ENCOUNTER
----- Message from Med Assistant Ward sent at 2/6/2025 12:14 PM CST -----  Name of Who is Calling:WAI SANCHEZ [[id number]]           What is the request in detail: Pt states she needs an order for a mammogram placed in the system. Please contact to further discuss and advise.           Can the clinic reply by [facility]: yes           What Number to Call Back [facility] not [facility]:  [phone]

## 2025-02-11 ENCOUNTER — TELEPHONE (OUTPATIENT)
Dept: FAMILY MEDICINE | Facility: CLINIC | Age: 55
End: 2025-02-11
Payer: COMMERCIAL

## 2025-02-11 DIAGNOSIS — E66.01 SEVERE OBESITY (BMI 35.0-39.9) WITH COMORBIDITY: ICD-10-CM

## 2025-02-11 DIAGNOSIS — K76.0 FATTY LIVER: ICD-10-CM

## 2025-02-11 DIAGNOSIS — J84.9 INTERSTITIAL PULMONARY DISEASE, UNSPECIFIED: ICD-10-CM

## 2025-02-11 DIAGNOSIS — C43.4 MELANOMA OF SCALP: ICD-10-CM

## 2025-02-11 DIAGNOSIS — R73.03 PRE-DIABETES: ICD-10-CM

## 2025-02-11 DIAGNOSIS — E78.5 HYPERLIPIDEMIA, UNSPECIFIED HYPERLIPIDEMIA TYPE: ICD-10-CM

## 2025-02-11 DIAGNOSIS — Z00.00 ANNUAL PHYSICAL EXAM: Primary | ICD-10-CM

## 2025-02-11 NOTE — TELEPHONE ENCOUNTER
Spoke to pt's spouse, Mohan. Pt needed a later appt in the year. Switched appt's between them.     Pt is s/c in May. Please place annual labs for pt.

## 2025-03-13 ENCOUNTER — OFFICE VISIT (OUTPATIENT)
Dept: OBSTETRICS AND GYNECOLOGY | Facility: CLINIC | Age: 55
End: 2025-03-13
Attending: OBSTETRICS & GYNECOLOGY
Payer: COMMERCIAL

## 2025-03-13 ENCOUNTER — HOSPITAL ENCOUNTER (OUTPATIENT)
Dept: RADIOLOGY | Facility: OTHER | Age: 55
Discharge: HOME | End: 2025-03-13
Attending: OBSTETRICS & GYNECOLOGY
Payer: COMMERCIAL

## 2025-03-13 VITALS
WEIGHT: 185.19 LBS | SYSTOLIC BLOOD PRESSURE: 132 MMHG | BODY MASS INDEX: 34.96 KG/M2 | DIASTOLIC BLOOD PRESSURE: 70 MMHG | HEIGHT: 61 IN

## 2025-03-13 DIAGNOSIS — Z78.0 POSTMENOPAUSAL STATUS: ICD-10-CM

## 2025-03-13 DIAGNOSIS — Z12.31 ENCOUNTER FOR SCREENING MAMMOGRAM FOR BREAST CANCER: ICD-10-CM

## 2025-03-13 DIAGNOSIS — Z12.4 SCREENING FOR MALIGNANT NEOPLASM OF THE CERVIX: ICD-10-CM

## 2025-03-13 DIAGNOSIS — R68.82 DECREASED LIBIDO: ICD-10-CM

## 2025-03-13 DIAGNOSIS — R82.90 CLOUDY URINE: ICD-10-CM

## 2025-03-13 DIAGNOSIS — Z01.419 WOMEN'S ANNUAL ROUTINE GYNECOLOGICAL EXAMINATION: Primary | ICD-10-CM

## 2025-03-13 LAB
BILIRUB SERPL-MCNC: NEGATIVE MG/DL
BLOOD URINE, POC: NEGATIVE
CLARITY, POC UA: CLEAR
COLOR, POC UA: YELLOW
GLUCOSE UR QL STRIP: NORMAL
KETONES UR QL STRIP: NEGATIVE
LEUKOCYTE ESTERASE URINE, POC: NEGATIVE
NITRITE, POC UA: NEGATIVE
PH, POC UA: 7
PROTEIN, POC: NEGATIVE
SPECIFIC GRAVITY, POC UA: 1
UROBILINOGEN, POC UA: NORMAL

## 2025-03-13 PROCEDURE — 87624 HPV HI-RISK TYP POOLED RSLT: CPT | Performed by: OBSTETRICS & GYNECOLOGY

## 2025-03-13 PROCEDURE — 99999 PR PBB SHADOW E&M-EST. PATIENT-LVL IV: CPT | Mod: PBBFAC,,, | Performed by: OBSTETRICS & GYNECOLOGY

## 2025-03-13 PROCEDURE — 77067 SCR MAMMO BI INCL CAD: CPT | Mod: TC

## 2025-03-13 PROCEDURE — 77063 BREAST TOMOSYNTHESIS BI: CPT | Mod: 26,,, | Performed by: RADIOLOGY

## 2025-03-13 PROCEDURE — 77067 SCR MAMMO BI INCL CAD: CPT | Mod: 26,,, | Performed by: RADIOLOGY

## 2025-03-13 NOTE — PROGRESS NOTES
"Isela Graf is a 54 y.o. year old  female who presents for routine GYN exam.  She is postmenopausal, not on hormones.  S/P D&C / hysteroscopy 23 for postmenopausal bleeding.  Since then, she has not had any bleeding  Denies hot flashes / sweats.  Reports decreased libido.  Describes urine being somewhat cloudy.  Recent labs show that she is prediabetic with mildly elevated cholesterol.  Otherwise, denies recent changes in her medical / surgical history.  Mammogram performed today.    Blood pressure 132/70, height 5' 1" (1.549 m), weight 84 kg (185 lb 3 oz), last menstrual period 10/12/2018.    12/15/22 Pap: Negative, HPV: Negative    23 Mammogram: Negative, TC: 9.06  3/13/25 Mammogram: results pending    Urine dip today: Negative    Past Medical History:   Diagnosis Date    Alpha-1-antitrypsin deficiency 2022    Anxiety     Constipation     Depression     Hyperlipidemia     Liver fibrosis 08/10/2022    Melanoma of scalp 2015    excised by Dr Aldridge    Open wound of scalp, complicated 2015    Pre-diabetes 2022    Seasonal allergies     Thickened endometrium 2020    7 mm endometrial stripe.  This would be slightly thickened for a postmenopausal patient.  Further evaluation as clinically indicated.    Uterine fibroid 2020    1.1cm Small uterine fibroid identified.       Past Surgical History:   Procedure Laterality Date    APPENDECTOMY      CHOLECYSTECTOMY      COLONOSCOPY N/A 2020    Procedure: COLONOSCOPY;  Surgeon: Quan Morton MD;  Location: [facility] ([address] [place] [address]);  Service: Endoscopy;  Laterality: N/A;   - pt aware if drop off location and visitor policy -   COVID screening scheduled for 20 at [facility] -     ESOPHAGOGASTRODUODENOSCOPY N/A 2020    Procedure: EGD (ESOPHAGOGASTRODUODENOSCOPY);  Surgeon: Quan Morton MD;  [facility]  Service: Endoscopy;  Laterality: N/A;    EYE SURGERY      GALLBLADDER " SURGERY  2017    HYSTEROSCOPY WITH DILATION AND CURETTAGE OF UTERUS N/A 2023    Procedure: HYSTEROSCOPY, WITH DILATION AND CURETTAGE OF UTERUS;  Surgeon: Philip Ochoa MD;  Location: [facility] OR;  Service: OB/GYN;  Laterality: N/A;    LIVER BIOPSY N/A 2023    Procedure: BIOPSY, LIVER ULTRA SOUND GUIDANCE;  Surgeon: Polo Alvarado MD;  Location: [facility] OR;  Service: Radiology;  Laterality: N/A;    NEEDLE LOCALIZATION N/A 2023    Procedure: NEEDLE LOCALIZATION;  Surgeon: Polo Alvarado MD;  Location: [facility] OR;  Service: Radiology;  Laterality: N/A;    Scalp Reconstruction      yin-yang flaps    TONSILLECTOMY      WLE scalp melanoma  2015       OB History          7    Para   2    Term   2            AB   3    Living   2         SAB   3    IAB        Ectopic        Multiple        Live Births                       ROS:  GENERAL: Feeling well overall.   SKIN: Denies rash or lesions.   HEAD: Denies head injury or headache.   NODES: Denies enlarged lymph nodes.   CHEST: Denies chest pain or shortness of breath.   CARDIOVASCULAR: Denies palpitations or left sided chest pain.   ABDOMEN: No abdominal pain, nausea, vomiting or rectal bleeding.   URINARY: No dysuria or hematuria.  REPRODUCTIVE: See HPI.   BREASTS: Denies pain, lumps, or nipple discharge.   HEMATOLOGIC: No easy bruisability or excessive bleeding.   MUSCULOSKELETAL:  Reports some joint discomfort.  NEUROLOGIC: Denies syncope or weakness.   PSYCHIATRIC:  Decreased libido.     PE:   (chaperone present during entire exam)  APPEARANCE: Well nourished, well developed, in no acute distress.  BREASTS: Symmetrical, no skin changes or visible lesions. No palpable masses, nipple discharge or adenopathy bilaterally.  ABDOMEN: Soft. No tenderness or masses.   VULVA:  Atrophic.  No lesions. Normal female genitalia.  URETHRAL MEATUS: Normal size and location, no lesions, no prolapse.  URETHRA: No masses, tenderness, prolapse  or scarring.  VAGINA: Atrophic.  No lesions, no abnormal discharge, no significant cystocele or rectocele.  CERVIX: No lesions and discharge. PAP done.  UTERUS: Normal size, regular shape, mobile, non-tender, bladder base nontender.  ADNEXA: No masses, tenderness or CDS nodularity.  ANUS PERINEUM: Normal.    Diagnosis:  1. Women's annual routine gynecological examination    2. Postmenopausal status    3. Decreased libido    4. Screening for malignant neoplasm of the cervix    5. Cloudy urine          PLAN:    Orders Placed This Encounter    HPV High Risk Genotypes, PCR    POCT URINE DIPSTICK WITHOUT MICROSCOPE    Liquid-Based Pap Smear, Screening       Patient was counseled today on postmenopausal issues.  We discussed libido as a multifactorial issue.  She is interested [facility] - consult placed.    Follow-up in 1 year.    This note was created with voice recognition software.  Grammatical, syntax and spelling errors may be inevitable.

## 2025-03-14 ENCOUNTER — PATIENT MESSAGE (OUTPATIENT)
Dept: OBSTETRICS AND GYNECOLOGY | Facility: CLINIC | Age: 55
End: 2025-03-14
Payer: COMMERCIAL

## 2025-03-18 ENCOUNTER — PATIENT MESSAGE (OUTPATIENT)
Dept: OBSTETRICS AND GYNECOLOGY | Facility: CLINIC | Age: 55
End: 2025-03-18
Payer: COMMERCIAL

## 2025-03-27 RX ORDER — ALBUTEROL SULFATE 90 UG/1
2 INHALANT RESPIRATORY (INHALATION) EVERY 4 HOURS PRN
Qty: 17 G | Refills: 11 | Status: SHIPPED | OUTPATIENT
Start: 2025-03-27

## 2025-04-09 ENCOUNTER — PATIENT MESSAGE (OUTPATIENT)
Dept: OBSTETRICS AND GYNECOLOGY | Facility: CLINIC | Age: 55
End: 2025-04-09
Payer: COMMERCIAL

## 2025-04-15 ENCOUNTER — TELEPHONE (OUTPATIENT)
Dept: OBSTETRICS AND GYNECOLOGY | Facility: CLINIC | Age: 55
End: 2025-04-15
Payer: COMMERCIAL

## 2025-04-15 NOTE — TELEPHONE ENCOUNTER
Left VM informing patient that NP Mame does not see patients for hormones/menopause. Cancelled appointment and advised patient to call/send message to get scheduled correctly with appropriate provider.

## 2025-05-02 ENCOUNTER — LAB VISIT (OUTPATIENT)
Dept: LAB | Facility: HOSPITAL | Age: 55
End: 2025-05-02
Attending: FAMILY MEDICINE
Payer: COMMERCIAL

## 2025-05-02 DIAGNOSIS — J84.9 INTERSTITIAL PULMONARY DISEASE, UNSPECIFIED: ICD-10-CM

## 2025-05-02 DIAGNOSIS — R73.03 PRE-DIABETES: ICD-10-CM

## 2025-05-02 DIAGNOSIS — E78.5 HYPERLIPIDEMIA, UNSPECIFIED HYPERLIPIDEMIA TYPE: ICD-10-CM

## 2025-05-02 DIAGNOSIS — Z00.00 ANNUAL PHYSICAL EXAM: ICD-10-CM

## 2025-05-02 DIAGNOSIS — E66.01 SEVERE OBESITY (BMI 35.0-39.9) WITH COMORBIDITY: ICD-10-CM

## 2025-05-02 DIAGNOSIS — K76.0 FATTY LIVER: ICD-10-CM

## 2025-05-02 DIAGNOSIS — C43.4 MELANOMA OF SCALP: ICD-10-CM

## 2025-05-02 LAB
ABSOLUTE EOSINOPHIL (OHS): 0.16 K/UL
ABSOLUTE MONOCYTE (OHS): 0.53 K/UL (ref 0.3–1)
ABSOLUTE NEUTROPHIL COUNT (OHS): 4.91 K/UL (ref 1.8–7.7)
ALBUMIN SERPL BCP-MCNC: 4.7 G/DL (ref 3.5–5.2)
ALP SERPL-CCNC: 106 UNIT/L (ref 38–126)
ALT SERPL W/O P-5'-P-CCNC: 42 UNIT/L (ref 10–44)
ANION GAP (OHS): 13 MMOL/L (ref 8–16)
AST SERPL-CCNC: 34 UNIT/L (ref 15–46)
BACTERIA #/AREA URNS HPF: ABNORMAL /HPF
BASOPHILS # BLD AUTO: 0.05 K/UL
BASOPHILS NFR BLD AUTO: 0.6 %
BILIRUB SERPL-MCNC: 0.2 MG/DL (ref 0.1–1)
BILIRUB UR QL STRIP.AUTO: NEGATIVE
BUN SERPL-MCNC: 8 MG/DL (ref 7–17)
CALCIUM SERPL-MCNC: 9.5 MG/DL (ref 8.7–10.5)
CHLORIDE SERPL-SCNC: 102 MMOL/L (ref 95–110)
CHOLEST SERPL-MCNC: 183 MG/DL (ref 120–199)
CHOLEST/HDLC SERPL: 3.7 {RATIO} (ref 2–5)
CLARITY UR: ABNORMAL
CO2 SERPL-SCNC: 26 MMOL/L (ref 23–29)
COLOR UR AUTO: YELLOW
CREAT SERPL-MCNC: 0.5 MG/DL (ref 0.5–1.4)
EAG (OHS): 123 MG/DL (ref 68–131)
ERYTHROCYTE [DISTWIDTH] IN BLOOD BY AUTOMATED COUNT: 12.9 % (ref 11.5–14.5)
GFR SERPLBLD CREATININE-BSD FMLA CKD-EPI: >60 ML/MIN/1.73/M2
GLUCOSE SERPL-MCNC: 115 MG/DL (ref 70–110)
GLUCOSE UR QL STRIP: NEGATIVE
HBA1C MFR BLD: 5.9 % (ref 4–5.6)
HCT VFR BLD AUTO: 40.9 % (ref 37–48.5)
HDLC SERPL-MCNC: 50 MG/DL (ref 40–75)
HDLC SERPL: 27.3 % (ref 20–50)
HGB BLD-MCNC: 13.2 GM/DL (ref 12–16)
HGB UR QL STRIP: ABNORMAL
HOLD SPECIMEN: NORMAL
IMM GRANULOCYTES # BLD AUTO: 0.02 K/UL (ref 0–0.04)
IMM GRANULOCYTES NFR BLD AUTO: 0.2 % (ref 0–0.5)
KETONES UR QL STRIP: NEGATIVE
LDLC SERPL CALC-MCNC: 95.4 MG/DL (ref 63–159)
LEUKOCYTE ESTERASE UR QL STRIP: NEGATIVE
LYMPHOCYTES # BLD AUTO: 2.44 K/UL (ref 1–4.8)
MCH RBC QN AUTO: 27.9 PG (ref 27–31)
MCHC RBC AUTO-ENTMCNC: 32.3 G/DL (ref 32–36)
MCV RBC AUTO: 87 FL (ref 82–98)
MICROSCOPIC COMMENT: ABNORMAL
NITRITE UR QL STRIP: NEGATIVE
NONHDLC SERPL-MCNC: 133 MG/DL
NUCLEATED RBC (/100WBC) (OHS): 0 /100 WBC
PH UR STRIP: 6 [PH]
PLATELET # BLD AUTO: 327 K/UL (ref 150–450)
PMV BLD AUTO: 10.3 FL (ref 9.2–12.9)
POTASSIUM SERPL-SCNC: 4.3 MMOL/L (ref 3.5–5.1)
PROT SERPL-MCNC: 7.9 GM/DL (ref 6–8.4)
PROT UR QL STRIP: ABNORMAL
RBC # BLD AUTO: 4.73 M/UL (ref 4–5.4)
RBC #/AREA URNS HPF: 2 /HPF (ref 0–4)
RELATIVE EOSINOPHIL (OHS): 2 %
RELATIVE LYMPHOCYTE (OHS): 30.1 % (ref 18–48)
RELATIVE MONOCYTE (OHS): 6.5 % (ref 4–15)
RELATIVE NEUTROPHIL (OHS): 60.6 % (ref 38–73)
SODIUM SERPL-SCNC: 141 MMOL/L (ref 136–145)
SP GR UR STRIP: 1.02
TRIGL SERPL-MCNC: 188 MG/DL (ref 30–150)
TSH SERPL-ACNC: 1.17 UIU/ML (ref 0.4–4)
UROBILINOGEN UR STRIP-ACNC: NEGATIVE EU/DL
WBC # BLD AUTO: 8.11 K/UL (ref 3.9–12.7)
WBC #/AREA URNS HPF: 9 /HPF (ref 0–5)

## 2025-05-02 PROCEDURE — 80053 COMPREHEN METABOLIC PANEL: CPT | Mod: PN

## 2025-05-02 PROCEDURE — 83036 HEMOGLOBIN GLYCOSYLATED A1C: CPT | Mod: PN

## 2025-05-02 PROCEDURE — 84443 ASSAY THYROID STIM HORMONE: CPT | Mod: PN

## 2025-05-02 PROCEDURE — 36415 COLL VENOUS BLD VENIPUNCTURE: CPT | Mod: PN

## 2025-05-02 PROCEDURE — 80061 LIPID PANEL: CPT | Mod: PN

## 2025-05-02 PROCEDURE — 81003 URINALYSIS AUTO W/O SCOPE: CPT | Mod: PN

## 2025-05-02 PROCEDURE — 85025 COMPLETE CBC W/AUTO DIFF WBC: CPT | Mod: PN

## 2025-05-02 PROCEDURE — 81000 URINALYSIS NONAUTO W/SCOPE: CPT | Mod: PN

## 2025-05-12 ENCOUNTER — OFFICE VISIT (OUTPATIENT)
Dept: FAMILY MEDICINE | Facility: CLINIC | Age: 55
End: 2025-05-12
Payer: COMMERCIAL

## 2025-05-12 VITALS
TEMPERATURE: 98 F | WEIGHT: 184.75 LBS | HEART RATE: 88 BPM | HEIGHT: 61 IN | OXYGEN SATURATION: 95 % | DIASTOLIC BLOOD PRESSURE: 78 MMHG | BODY MASS INDEX: 34.88 KG/M2 | SYSTOLIC BLOOD PRESSURE: 122 MMHG

## 2025-05-12 DIAGNOSIS — K76.0 FATTY LIVER: ICD-10-CM

## 2025-05-12 DIAGNOSIS — J84.9 INTERSTITIAL PULMONARY DISEASE, UNSPECIFIED: ICD-10-CM

## 2025-05-12 DIAGNOSIS — E78.5 HYPERLIPIDEMIA, UNSPECIFIED HYPERLIPIDEMIA TYPE: ICD-10-CM

## 2025-05-12 DIAGNOSIS — C43.4 MELANOMA OF SCALP: ICD-10-CM

## 2025-05-12 DIAGNOSIS — E66.09 CLASS 1 OBESITY DUE TO EXCESS CALORIES WITH SERIOUS COMORBIDITY AND BODY MASS INDEX (BMI) OF 33.0 TO 33.9 IN ADULT: ICD-10-CM

## 2025-05-12 DIAGNOSIS — E66.811 CLASS 1 OBESITY DUE TO EXCESS CALORIES WITH SERIOUS COMORBIDITY AND BODY MASS INDEX (BMI) OF 33.0 TO 33.9 IN ADULT: ICD-10-CM

## 2025-05-12 DIAGNOSIS — J40 BRONCHITIS: Primary | ICD-10-CM

## 2025-05-12 DIAGNOSIS — E88.01 ALPHA-1-ANTITRYPSIN DEFICIENCY: ICD-10-CM

## 2025-05-12 PROCEDURE — 1160F RVW MEDS BY RX/DR IN RCRD: CPT | Mod: CPTII,S$GLB,, | Performed by: FAMILY MEDICINE

## 2025-05-12 PROCEDURE — 3078F DIAST BP <80 MM HG: CPT | Mod: CPTII,S$GLB,, | Performed by: FAMILY MEDICINE

## 2025-05-12 PROCEDURE — 99214 OFFICE O/P EST MOD 30 MIN: CPT | Mod: S$GLB,,, | Performed by: FAMILY MEDICINE

## 2025-05-12 PROCEDURE — 3074F SYST BP LT 130 MM HG: CPT | Mod: CPTII,S$GLB,, | Performed by: FAMILY MEDICINE

## 2025-05-12 PROCEDURE — 1159F MED LIST DOCD IN RCRD: CPT | Mod: CPTII,S$GLB,, | Performed by: FAMILY MEDICINE

## 2025-05-12 PROCEDURE — 3044F HG A1C LEVEL LT 7.0%: CPT | Mod: CPTII,S$GLB,, | Performed by: FAMILY MEDICINE

## 2025-05-12 PROCEDURE — 3008F BODY MASS INDEX DOCD: CPT | Mod: CPTII,S$GLB,, | Performed by: FAMILY MEDICINE

## 2025-05-12 RX ORDER — AMOXICILLIN AND CLAVULANATE POTASSIUM 875; 125 MG/1; MG/1
1 TABLET, FILM COATED ORAL EVERY 12 HOURS
Qty: 14 TABLET | Refills: 0 | Status: SHIPPED | OUTPATIENT
Start: 2025-05-12

## 2025-05-12 RX ORDER — CODEINE PHOSPHATE AND GUAIFENESIN 10; 100 MG/5ML; MG/5ML
10 SOLUTION ORAL EVERY 4 HOURS PRN
Qty: 240 ML | Refills: 1 | Status: SHIPPED | OUTPATIENT
Start: 2025-05-12 | End: 2025-05-22

## 2025-05-12 NOTE — PROGRESS NOTES
"Subjective:      Patient ID: Isela Graf is a 54 y.o. female.    Chief Complaint: Annual Exam      Vitals:    05/12/25 1414   BP: 122/78   Pulse: 88   Temp: 98.4 °F (36.9 °C)   TempSrc: Oral   SpO2: 95%   Weight: 83.8 kg (184 lb 11.9 oz)   Height: 5' 1" (1.549 m)        HPI   Annual, sick with cough for greeen phlegm; non smoker; here with ; no fever, c/o SOB  Wants abx  Non in severeal months; gets bronchitit  s    Problem List  Problem List[1]     ALLERGIES:   Review of patient's allergies indicates:   Allergen Reactions    Sulfa (sulfonamide antibiotics) Other (See Comments)     Stomach pain       MEDS: Current Medications[2]      History:  Current Providers as of 5/12/2025  PCP: Carlos Santiago MD  Care Team Provider: Stephanie Hughes LPN  Care Team Provider: Quan Morton MD  Care Team Provider: Philip Ochoa MD  Care Team Provider: Gabriela Green NP  Care Team Provider: Kylah Le MD  Encounter Provider: Carlos Santiago MD, starting on Mon May 12, 2025 12:00 AM  Referring Provider: not found, starting on Mon May 12, 2025 12:00 AM  Consulting Physician: Carlos Santiago MD, starting on Fri Feb 14, 2025  9:36 AM (Active)   Past Medical History:   Diagnosis Date    Allergy     Alpha-1-antitrypsin deficiency 07/25/2022    Anxiety Unknown    Basal cell carcinoma 1/1/2003    Guesstimating    Constipation     Depression Unknown    Hyperlipidemia     Keloid cicatrix 1/1/1981    Guesstimating    Liver fibrosis 08/10/2022    Melanoma of scalp 01/12/2015    excised by Dr Aldridge    Open wound of scalp, complicated 02/06/2015    Pre-diabetes 07/25/2022    Seasonal allergies     Thickened endometrium 06/26/2020    7 mm endometrial stripe.  This would be slightly thickened for a postmenopausal patient.  Further evaluation as clinically indicated.    Uterine fibroid 06/2020    1.1cm Small uterine fibroid identified.     Past Surgical History:   Procedure Laterality Date    APPENDECTOMY "      CHOLECYSTECTOMY      COLONOSCOPY N/A 06/02/2020    Procedure: COLONOSCOPY;  Surgeon: Quan Morton MD;  Location: [facility] (4TH FLR);  Service: Endoscopy;  Laterality: N/A;  5/13 - pt aware if drop off location and visitor policy -   COVID screening scheduled for 6/1/20 at [facility] -     ESOPHAGOGASTRODUODENOSCOPY N/A 06/02/2020    Procedure: EGD (ESOPHAGOGASTRODUODENOSCOPY);  Surgeon: Quan Morton MD;  Location: [facility] ENDO (4TH FLR);  Service: Endoscopy;  Laterality: N/A;    EYE SURGERY      GALLBLADDER SURGERY  02/2017    HYSTEROSCOPY WITH DILATION AND CURETTAGE OF UTERUS N/A 04/04/2023    Procedure: HYSTEROSCOPY, WITH DILATION AND CURETTAGE OF UTERUS;  Surgeon: Philip Ochoa MD;  Location: [facility] OR;  Service: OB/GYN;  Laterality: N/A;    LIVER BIOPSY N/A 04/04/2023    Procedure: BIOPSY, LIVER ULTRA SOUND GUIDANCE;  Surgeon: Polo Alvarado MD;  Location: [facility] OR;  Service: Radiology;  Laterality: N/A;    NEEDLE LOCALIZATION N/A 04/04/2023    Procedure: NEEDLE LOCALIZATION;  Surgeon: Polo Alvarado MD;  Location: [facility] OR;  Service: Radiology;  Laterality: N/A;    Scalp Reconstruction      yin-hull flaps    SKIN BIOPSY  Several times    TONSILLECTOMY      WLE scalp melanoma  01/26/2015     Social History[3]      Review of Systems   Constitutional: Negative.    HENT: Negative.     Respiratory:  Positive for cough and wheezing.    Cardiovascular: Negative.    Gastrointestinal: Negative.    Endocrine: Negative.    Genitourinary: Negative.    Musculoskeletal: Negative.    Psychiatric/Behavioral: Negative.     All other systems reviewed and are negative.    Objective:     Physical Exam  Vitals and nursing note reviewed.   Constitutional:       Appearance: She is well-developed.   HENT:      Head: Normocephalic.   Eyes:      Conjunctiva/sclera: Conjunctivae normal.      Pupils: Pupils are equal, round, and reactive to light.   Cardiovascular:      Rate and Rhythm: Normal rate and  regular rhythm.      Heart sounds: Normal heart sounds.   Pulmonary:      Effort: Pulmonary effort is normal.      Breath sounds: Normal breath sounds.   Musculoskeletal:         General: Normal range of motion.      Cervical back: Normal range of motion and neck supple.   Skin:     General: Skin is warm and dry.   Neurological:      General: No focal deficit present.      Mental Status: She is alert and oriented to person, place, and time. Mental status is at baseline.      Deep Tendon Reflexes: Reflexes are normal and symmetric.   Psychiatric:         Mood and Affect: Mood normal.         Behavior: Behavior normal.         Thought Content: Thought content normal.         Judgment: Judgment normal.             Assessment:     1. Bronchitis    2. Melanoma of scalp    3. Alpha-1-antitrypsin deficiency    4. Interstitial pulmonary disease, unspecified      Plan:        Medication List            Accurate as of May 12, 2025  2:41 PM. If you have any questions, ask your nurse or doctor.                START taking these medications      amoxicillin-clavulanate 875-125mg 875-125 mg per tablet  Commonly known as: AUGMENTIN  Take 1 tablet by mouth every 12 (twelve) hours.  Started by: Carlos Santiago MD     guaiFENesin-codeine 100-10 mg/5 ml  mg/5 mL syrup  Commonly known as: TUSSI-ORGANIDIN NR  Take 10 mLs by mouth every 4 (four) hours as needed for Cough.  Started by: Carlos Santiago MD            CONTINUE taking these medications      albuterol 90 mcg/actuation inhaler  Commonly known as: PROVENTIL/VENTOLIN HFA  Inhale 2 puffs into the lungs every 4 (four) hours as needed for Wheezing. Rescue     azelastine 137 mcg (0.1 %) nasal spray  Commonly known as: ASTELIN  1 spray (137 mcg total) by Nasal route 2 (two) times daily.     BODY, HAIR, SKIN AND NAILS ORAL     calcium carbonate 600 mg calcium (1,500 mg) Tab  Commonly known as: OS-ADAMA     citalopram 10 MG tablet  Commonly known as: CeleXA  TAKE 1 TABLET EVERY  EVENING     esomeprazole 20 MG capsule  Commonly known as: NEXIUM  TAKE 1 CAPSULE BEFORE BREAKFAST     fexofenadine 180 MG tablet  Commonly known as: ALLEGRA     fish oil-omega-3 fatty acids 300-1,000 mg capsule     fluticasone propionate 50 mcg/actuation nasal spray  Commonly known as: FLONASE  2 sprays (100 mcg total) by Each Nostril route daily as needed.     hydrocortisone 2.5 % cream  Apply twice daily to affected area of skin     multivit-iron-min-folic acid 3,500-18-0.4 unit-mg-mg Chew     psyllium powder  Commonly known as: METAMUCIL     rosuvastatin 20 MG tablet  Commonly known as: CRESTOR  TAKE 1 TABLET EVERY EVENING     tiZANidine 4 MG tablet  Commonly known as: ZANAFLEX  Take 1 tablet (4 mg total) by mouth daily as needed.     triamcinolone acetonide 0.025% 0.025 % cream  Commonly known as: KENALOG  Apply topically 2 (two) times daily. To affected areas on face x 1-2 wks then prn flares only               Where to Get Your Medications        These medications were sent to [id number] [address] [address]      Phone: [phone]   amoxicillin-clavulanate 875-125mg 875-125 mg per tablet  guaiFENesin-codeine 100-10 mg/5 ml  mg/5 mL syrup       Bronchitis  -     guaiFENesin-codeine 100-10 mg/5 ml (TUSSI-ORGANIDIN NR)  mg/5 mL syrup; Take 10 mLs by mouth every 4 (four) hours as needed for Cough.  Dispense: 240 mL; Refill: 1    Melanoma of scalp  -     guaiFENesin-codeine 100-10 mg/5 ml (TUSSI-ORGANIDIN NR)  mg/5 mL syrup; Take 10 mLs by mouth every 4 (four) hours as needed for Cough.  Dispense: 240 mL; Refill: 1    Alpha-1-antitrypsin deficiency    Interstitial pulmonary disease, unspecified    Other orders  -     amoxicillin-clavulanate 875-125mg (AUGMENTIN) 875-125 mg per tablet; Take 1 tablet by mouth every 12 (twelve) hours.  Dispense: 14 tablet; Refill: 0               [1]   Patient Active Problem List  Diagnosis     [id number] melanoma of scalp    RUQ pain    Extrinsic asthma without complication    Dyspepsia    Irritable bowel syndrome with constipation    Hyperlipidemia    Fatty liver    Alpha-1-antitrypsin deficiency    Class 1 obesity due to excess calories with serious comorbidity and body mass index (BMI) of 33.0 to 33.9 in adult    Postmenopausal bleeding    Seasonal allergic rhinitis    Interstitial pulmonary disease, unspecified    Monoallelic mutation of MITF gene   [2]   Current Outpatient Medications:     albuterol (PROVENTIL/VENTOLIN HFA) 90 mcg/actuation inhaler, Inhale 2 puffs into the lungs every 4 (four) hours as needed for Wheezing. Rescue, Disp: 17 g, Rfl: 11    azelastine (ASTELIN) 137 mcg (0.1 %) nasal spray, 1 spray (137 mcg total) by Nasal route 2 (two) times daily., Disp: 30 mL, Rfl: 3    calcium carbonate (OS-JUNIOR) 600 mg calcium (1,500 mg) Tab, Take 600 mg by mouth once daily., Disp: , Rfl:     citalopram (CELEXA) 10 MG tablet, TAKE 1 TABLET EVERY EVENING, Disp: 90 tablet, Rfl: 3    esomeprazole (NEXIUM) 20 MG capsule, TAKE 1 CAPSULE BEFORE BREAKFAST, Disp: 90 capsule, Rfl: 3    fexofenadine (ALLEGRA) 180 MG tablet, Take 180 mg by mouth as needed. , Disp: , Rfl:     fish oil-omega-3 fatty acids 300-1,000 mg capsule, Take 2 g by mouth after lunch. , Disp: , Rfl:     fluticasone propionate (FLONASE) 50 mcg/actuation nasal spray, 2 sprays (100 mcg total) by Each Nostril route daily as needed., Disp: 48 g, Rfl: 3    hydrocortisone 2.5 % cream, Apply twice daily to affected area of skin, Disp: 30 g, Rfl: 1    MULTIVIT-IRON-MIN-FOLIC ACID 3,500-18-0.4 UNIT-MG-MG ORAL CHEW, Take 1 tablet by mouth after lunch. , Disp: , Rfl:     mv,junior,iron,mn/folic acid/chol (BODY, HAIR, SKIN AND NAILS ORAL), Take by mouth., Disp: , Rfl:     psyllium (METAMUCIL) powder, Take 1 packet by mouth once daily., Disp: , Rfl:     rosuvastatin (CRESTOR) 20 MG tablet, TAKE 1 TABLET EVERY EVENING, Disp: 90 tablet, Rfl: 3    tiZANidine  (ZANAFLEX) 4 MG tablet, Take 1 tablet (4 mg total) by mouth daily as needed., Disp: 90 tablet, Rfl: 0    triamcinolone acetonide 0.025% (KENALOG) 0.025 % cream, Apply topically 2 (two) times daily. To affected areas on face x 1-2 wks then prn flares only, Disp: 30 g, Rfl: 2    amoxicillin-clavulanate 875-125mg (AUGMENTIN) 875-125 mg per tablet, Take 1 tablet by mouth every 12 (twelve) hours., Disp: 14 tablet, Rfl: 0    guaiFENesin-codeine 100-10 mg/5 ml (TUSSI-ORGANIDIN NR)  mg/5 mL syrup, Take 10 mLs by mouth every 4 (four) hours as needed for Cough., Disp: 240 mL, Rfl: 1  [3]   Social History  Tobacco Use    Smoking status: Never     Passive exposure: Never    Smokeless tobacco: Never   Substance Use Topics    Alcohol use: Yes     Alcohol/week: 1.0 standard drink of alcohol     Types: 1 Glasses of wine per week     Comment: ~1 bottle of wine nightly x years, stopped completed 2/2023, but resumed 1/2 bottle of wine a few times per week    Drug use: Yes     Types: Marijuana     Comment: medical thc gummy -daily

## 2025-05-29 NOTE — TELEPHONE ENCOUNTER
No care due was identified.  [facility] Embedded Care Due Messages. Reference number: [id number].   5/29/2025 2:27:31 PM CDT

## 2025-05-30 RX ORDER — TIZANIDINE 4 MG/1
4 TABLET ORAL DAILY PRN
Qty: 90 TABLET | Refills: 3 | Status: SHIPPED | OUTPATIENT
Start: 2025-05-30

## 2025-06-05 ENCOUNTER — PATIENT MESSAGE (OUTPATIENT)
Dept: DERMATOLOGY | Facility: CLINIC | Age: 55
End: 2025-06-05
Payer: COMMERCIAL

## 2025-06-05 NOTE — TELEPHONE ENCOUNTER
Please let them know- Dr Le says it doesn't look concerning but hard to tell for certain over photos    Isela is due for her 6 month melanoma skin check so please get her scheduled soonest available. August is okay or sooner if avail    Thanks!

## 2025-06-19 ENCOUNTER — LAB VISIT (OUTPATIENT)
Dept: LAB | Facility: OTHER | Age: 55
End: 2025-06-19
Attending: OBSTETRICS & GYNECOLOGY
Payer: COMMERCIAL

## 2025-06-19 ENCOUNTER — OFFICE VISIT (OUTPATIENT)
Dept: OBSTETRICS AND GYNECOLOGY | Facility: CLINIC | Age: 55
End: 2025-06-19
Attending: OBSTETRICS & GYNECOLOGY
Payer: COMMERCIAL

## 2025-06-19 VITALS
HEART RATE: 86 BPM | SYSTOLIC BLOOD PRESSURE: 108 MMHG | WEIGHT: 187 LBS | BODY MASS INDEX: 35.3 KG/M2 | HEIGHT: 61 IN | DIASTOLIC BLOOD PRESSURE: 74 MMHG

## 2025-06-19 DIAGNOSIS — Z78.0 MENOPAUSE: ICD-10-CM

## 2025-06-19 DIAGNOSIS — R68.82 DECREASED LIBIDO: ICD-10-CM

## 2025-06-19 DIAGNOSIS — Z78.0 MENOPAUSE: Primary | ICD-10-CM

## 2025-06-19 LAB
ESTRADIOL SERPL HS-MCNC: <10 PG/ML
TESTOST SERPL-MCNC: 23 NG/DL (ref 5–73)

## 2025-06-19 PROCEDURE — 99214 OFFICE O/P EST MOD 30 MIN: CPT | Mod: S$GLB,,, | Performed by: OBSTETRICS & GYNECOLOGY

## 2025-06-19 PROCEDURE — 84403 ASSAY OF TOTAL TESTOSTERONE: CPT

## 2025-06-19 PROCEDURE — 1159F MED LIST DOCD IN RCRD: CPT | Mod: CPTII,S$GLB,, | Performed by: OBSTETRICS & GYNECOLOGY

## 2025-06-19 PROCEDURE — 82670 ASSAY OF TOTAL ESTRADIOL: CPT

## 2025-06-19 PROCEDURE — 3074F SYST BP LT 130 MM HG: CPT | Mod: CPTII,S$GLB,, | Performed by: OBSTETRICS & GYNECOLOGY

## 2025-06-19 PROCEDURE — 99999 PR PBB SHADOW E&M-EST. PATIENT-LVL V: CPT | Mod: PBBFAC,,, | Performed by: OBSTETRICS & GYNECOLOGY

## 2025-06-19 PROCEDURE — 3044F HG A1C LEVEL LT 7.0%: CPT | Mod: CPTII,S$GLB,, | Performed by: OBSTETRICS & GYNECOLOGY

## 2025-06-19 PROCEDURE — 84402 ASSAY OF FREE TESTOSTERONE: CPT

## 2025-06-19 PROCEDURE — 3008F BODY MASS INDEX DOCD: CPT | Mod: CPTII,S$GLB,, | Performed by: OBSTETRICS & GYNECOLOGY

## 2025-06-19 PROCEDURE — 3078F DIAST BP <80 MM HG: CPT | Mod: CPTII,S$GLB,, | Performed by: OBSTETRICS & GYNECOLOGY

## 2025-06-19 PROCEDURE — 36415 COLL VENOUS BLD VENIPUNCTURE: CPT

## 2025-06-19 RX ORDER — ALPRAZOLAM 0.25 MG/1
TABLET ORAL 3 TIMES DAILY
COMMUNITY

## 2025-06-19 RX ORDER — ESTRADIOL 0.1 MG/G
1 CREAM VAGINAL
Qty: 8 G | Refills: 11 | Status: SHIPPED | OUTPATIENT
Start: 2025-06-19 | End: 2026-06-19

## 2025-06-19 RX ORDER — TESTOSTERONE 20.25 MG/1.25G
GEL TOPICAL
Qty: 88 G | Refills: 1 | Status: SHIPPED | OUTPATIENT
Start: 2025-06-19
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
## 2025-06-20 ENCOUNTER — RESULTS FOLLOW-UP (OUTPATIENT)
Dept: OBSTETRICS AND GYNECOLOGY | Facility: CLINIC | Age: 55
End: 2025-06-20

## 2025-06-23 LAB — W FREE TESTOSTERONE: <0.4 PG/ML

## 2025-07-16 ENCOUNTER — OFFICE VISIT (OUTPATIENT)
Dept: DERMATOLOGY | Facility: CLINIC | Age: 55
End: 2025-07-16
Payer: COMMERCIAL

## 2025-07-16 DIAGNOSIS — L82.0 INFLAMED SEBORRHEIC KERATOSIS: Primary | ICD-10-CM

## 2025-07-16 DIAGNOSIS — Z85.820 HISTORY OF MELANOMA: ICD-10-CM

## 2025-07-16 DIAGNOSIS — L21.9 SEBORRHEIC DERMATITIS: ICD-10-CM

## 2025-07-16 DIAGNOSIS — L81.4 LENTIGINES: ICD-10-CM

## 2025-07-16 DIAGNOSIS — D22.9 MULTIPLE BENIGN NEVI: ICD-10-CM

## 2025-07-16 DIAGNOSIS — D23.9 DERMATOFIBROMA: ICD-10-CM

## 2025-07-16 DIAGNOSIS — L73.8 SEBACEOUS HYPERPLASIA OF FACE: ICD-10-CM

## 2025-07-16 PROCEDURE — 99213 OFFICE O/P EST LOW 20 MIN: CPT | Mod: 25,S$GLB,, | Performed by: STUDENT IN AN ORGANIZED HEALTH CARE EDUCATION/TRAINING PROGRAM

## 2025-07-16 PROCEDURE — 1160F RVW MEDS BY RX/DR IN RCRD: CPT | Mod: CPTII,S$GLB,, | Performed by: STUDENT IN AN ORGANIZED HEALTH CARE EDUCATION/TRAINING PROGRAM

## 2025-07-16 PROCEDURE — 3044F HG A1C LEVEL LT 7.0%: CPT | Mod: CPTII,S$GLB,, | Performed by: STUDENT IN AN ORGANIZED HEALTH CARE EDUCATION/TRAINING PROGRAM

## 2025-07-16 PROCEDURE — 1159F MED LIST DOCD IN RCRD: CPT | Mod: CPTII,S$GLB,, | Performed by: STUDENT IN AN ORGANIZED HEALTH CARE EDUCATION/TRAINING PROGRAM

## 2025-07-16 PROCEDURE — 17110 DESTRUCTION B9 LES UP TO 14: CPT | Mod: S$GLB,,, | Performed by: STUDENT IN AN ORGANIZED HEALTH CARE EDUCATION/TRAINING PROGRAM

## 2025-07-16 PROCEDURE — 99999 PR PBB SHADOW E&M-EST. PATIENT-LVL III: CPT | Mod: PBBFAC,,, | Performed by: STUDENT IN AN ORGANIZED HEALTH CARE EDUCATION/TRAINING PROGRAM

## 2025-07-16 RX ORDER — KETOCONAZOLE 20 MG/G
CREAM TOPICAL
Qty: 60 G | Refills: 2 | Status: SHIPPED | OUTPATIENT
Start: 2025-07-16

## 2025-07-16 NOTE — PROGRESS NOTES
"  Subjective:      Patient ID:  Isela Graf is a 55 y.o. female who presents for   Chief Complaint   Patient presents with    Skin Check     TBSE      Isela Graf is a 54 y.o. female who presents for: FBSE screening exam for skin cancer, hx melanoma    Last office visit 11/04/2024 for FBSE  This is a high risk patient here to check for the development of new lesions.    Patient with new area of concern:   Location: Left temple   Duration: 3 months   Symptoms: asymptomatic   Denies: tenderness touch  Previous treatments: none     Pertinent history:  Personal history of mole removal: Yes  Fam hx melanoma: In grandparent, no hx pancreatic ca in family  Personal history of skin cancer: Yes - MM stage 1b excised from frontal scalp in 1/2015, BCC R cheek excised years ago, SCCis 7/26/22 s/p Efudex  Severely dysplastic nevus "concerning for early melanoma in situ" excised by me mid upper back 1/2024     Melanoma hx:  Pt initially had a biopsy of the scalp lesion by Dr. Wolf which revealed invasive melanoma, 0.42mm thick (with regression to 1.51 mm), but no mitoses or ulceration.   The excision specimen showed residual invasive melanoma extending to a max depth of 0.67 mm with negative margins.  SLN bx was attempted, but there was unsuccessful localization of the sentinel node.      Review of Systems   Skin:  Positive for daily sunscreen use (face), activity-related sunscreen use, recent sunburn (06/2025) and wears hat.   Hematologic/Lymphatic: Bruises/bleeds easily.       Objective:   Physical Exam   Constitutional: She appears well-developed and well-nourished. No distress.   Neurological: She is alert and oriented to person, place, and time. She is not disoriented.   Psychiatric: She has a normal mood and affect.   Skin:   Areas Examined (abnormalities noted in diagram):   Scalp / Hair Palpated and Inspected  Head / Face Inspection Performed  Neck Inspection Performed  Chest / Axilla Inspection " Performed  Abdomen Inspection Performed  Genitals / Buttocks / Groin Inspection Performed  Back Inspection Performed  RUE Inspected  LUE Inspection Performed  RLE Inspected  LLE Inspection Performed  Nails and Digits Inspection Performed                 Diagram Legend     Erythematous scaling macule/papule c/w actinic keratosis       Vascular papule c/w angioma      Pigmented verrucoid papule/plaque c/w seborrheic keratosis      Yellow umbilicated papule c/w sebaceous hyperplasia      Irregularly shaped tan macule c/w lentigo     1-2 mm smooth white papules consistent with Milia      Movable subcutaneous cyst with punctum c/w epidermal inclusion cyst      Subcutaneous movable cyst c/w pilar cyst      Firm pink to brown papule c/w dermatofibroma      Pedunculated fleshy papule(s) c/w skin tag(s)      Evenly pigmented macule c/w junctional nevus     Mildly variegated pigmented, slightly irregular-bordered macule c/w mildly atypical nevus      Flesh colored to evenly pigmented papule c/w intradermal nevus       Pink pearly papule/plaque c/w basal cell carcinoma      Erythematous hyperkeratotic cursted plaque c/w SCC      Surgical scar with no sign of skin cancer recurrence      Open and closed comedones      Inflammatory papules and pustules      Verrucoid papule consistent consistent with wart     Erythematous eczematous patches and plaques     Dystrophic onycholytic nail with subungual debris c/w onychomycosis     Umbilicated papule    Erythematous-base heme-crusted tan verrucoid plaque consistent with inflamed seborrheic keratosis     Erythematous Silvery Scaling Plaque c/w Psoriasis     See annotation      Assessment / Plan:      Inflamed seborrheic keratosis  Cryosurgery procedure note:    Verbal consent from the patient is obtained including, but not limited to, risk of hypopigmentation/hyperpigmentation, scar, recurrence of lesion. Liquid nitrogen cryosurgery is applied to 1 lesions to produce a freeze injury.  The patient is aware that blisters may form and is instructed on wound care with gentle cleansing and use of vaseline ointment to keep moist until healed. The patient is supplied a handout on cryosurgery and is instructed to call if lesions do not completely resolve.    Seborrheic dermatitis  Face- forehead, NLF  Start ketoconazole cream, mix 1:1 with hydrocortisone for flares twice daily    Lentigines  Multiple benign nevi  Dermatofibroma  Sebaceous hyperplasia of face  Reassurance    History of malignant melanoma  History NMSC  No fevers, chills, or unintended weight loss; lymph nodes examined  Well-healed scar located scalp, back no evidence of recurrence  - Continue skin exams q6 mo  - Discussed ABCDE's of nevi.  Monitor for new mole or moles that are becoming bigger, darker, irritated, or developing irregular borders. Instructed patient to observe lesion(s) for changes and follow up in clinic if changes are noted. Patient to monitor skin at home for new or changing lesions.     RTC 6 mo, sooner prn

## (undated) DEVICE — PACK FLUENT DISPOSABLE

## (undated) DEVICE — SEAL LENS SCOPE MYOSURE

## (undated) DEVICE — SOL IRR SOD CHL .9% POUR

## (undated) DEVICE — SOL POVIDONE PREP IODINE 4 OZ

## (undated) DEVICE — GLOVE BIOGEL SKINSENSE PI 7.0

## (undated) DEVICE — JELLY SURGILUBE 5GR

## (undated) DEVICE — SET BASIN 48X48IN 6000ML RING

## (undated) DEVICE — SOL POVIDONE SCRUB IODINE 4 OZ

## (undated) DEVICE — VIDEO RENTAL SERVICE

## (undated) DEVICE — SOL NACL IRR 3000ML

## (undated) DEVICE — UNDERGLOVES BIOGEL PI SIZE 7.5

## (undated) DEVICE — HANDLE EZ 3641

## (undated) DEVICE — DEVICE MYOSURE LITE TISS REM

## (undated) DEVICE — Device